# Patient Record
Sex: FEMALE | Race: WHITE | Employment: OTHER | URBAN - METROPOLITAN AREA
[De-identification: names, ages, dates, MRNs, and addresses within clinical notes are randomized per-mention and may not be internally consistent; named-entity substitution may affect disease eponyms.]

---

## 2017-01-05 ENCOUNTER — OFFICE VISIT (OUTPATIENT)
Dept: FAMILY MEDICINE CLINIC | Age: 82
End: 2017-01-05

## 2017-01-05 VITALS
OXYGEN SATURATION: 96 % | HEIGHT: 64 IN | WEIGHT: 158.25 LBS | TEMPERATURE: 98.3 F | HEART RATE: 71 BPM | SYSTOLIC BLOOD PRESSURE: 136 MMHG | RESPIRATION RATE: 16 BRPM | BODY MASS INDEX: 27.02 KG/M2 | DIASTOLIC BLOOD PRESSURE: 80 MMHG

## 2017-01-05 DIAGNOSIS — M85.80 OSTEOPENIA: ICD-10-CM

## 2017-01-05 DIAGNOSIS — E78.5 HYPERLIPIDEMIA, UNSPECIFIED HYPERLIPIDEMIA TYPE: ICD-10-CM

## 2017-01-05 DIAGNOSIS — Z12.11 SCREENING FOR COLON CANCER: ICD-10-CM

## 2017-01-05 DIAGNOSIS — G47.09 OTHER INSOMNIA: ICD-10-CM

## 2017-01-05 DIAGNOSIS — G31.84 MILD COGNITIVE IMPAIRMENT: ICD-10-CM

## 2017-01-05 DIAGNOSIS — R73.03 PRE-DIABETES: ICD-10-CM

## 2017-01-05 DIAGNOSIS — Z00.00 ENCOUNTER FOR MEDICARE ANNUAL WELLNESS EXAM: Primary | ICD-10-CM

## 2017-01-05 RX ORDER — CLINDAMYCIN HYDROCHLORIDE 150 MG/1
CAPSULE ORAL
Refills: 3 | COMMUNITY
Start: 2016-10-19 | End: 2020-07-28

## 2017-01-05 RX ORDER — ZOLPIDEM TARTRATE 5 MG/1
TABLET ORAL
Qty: 30 TAB | Refills: 0 | OUTPATIENT
Start: 2017-01-05 | End: 2018-02-01 | Stop reason: SDUPTHER

## 2017-01-05 NOTE — PROGRESS NOTES
Patient presents in office today to establish care with provider in office, change from [address] md    Chief Complaint   Patient presents with   [facility] Annual Wellness Visit          1. Have you been to the ER, urgent care clinic since your last visit? Hospitalized since your last visit? No    2. Have you seen or consulted any other health care providers outside of the [address] [place] [address] since your last visit? Include any pap smears or colon screening. No     Patient has not traveled outside of the [address]. In the past 30 days. Fall Risk Assessment, last 12 mths 1/5/2017   Able to walk? Yes   Fall in past 12 months?  No       PHQ 2 / 9, over the last two weeks 1/5/2017   Little interest or pleasure in doing things Not at all   Feeling down, depressed or hopeless Not at all   Total Score PHQ 2 0

## 2017-01-05 NOTE — PROGRESS NOTES
Jose Manuel Hoyos is a 80 y.o. female and presents for annual Medicare Wellness Visit. Problem List: Reviewed with patient and discussed risk factors. Patient Active Problem List   Diagnosis Code    Arthritis of knee, right M19.90    Insomnia G47.00    Overactive bladder N32.81    AR (allergic rhinitis) J30.9    Osteoarthritis of multiple joints M15.9    GERD (gastroesophageal reflux disease) K21.9    Hypercholesterolemia E78.00    Pre-diabetes R73.03    Osteopenia M85.80    Unspecified vitamin D deficiency E55.9    Urinary incontinence R32       Current medical providers:  Patient Care Team:  Gerard Souza MD as PCP - General (Family Practice)    PSH: Reviewed with patient  Past Surgical History   Procedure Laterality Date    Hx tonsil and adenoidectomy       age 10    Hx orthopaedic  10/99      R hip replacement >infected redone 4/00>10/00    Hx orthopaedic       LT KNEE ARTHROSCOPY    Hx orthopaedic  7/15/13     R knee replacement        SH: Reviewed with patient  Social History   Substance Use Topics    Smoking status: Never Smoker    Smokeless tobacco: Never Used    Alcohol use Yes      Comment: 3 glasses of wine per night       FH: Reviewed with patient  Family History   Problem Relation Age of Onset    Psychiatric Disorder Daughter      drug overdose    Cancer Sister      lung       Medications/Allergies: Reviewed with patient  Current Outpatient Prescriptions on File Prior to Visit   Medication Sig Dispense Refill    alendronate (FOSAMAX) 35 mg tablet TAKE ONE TABLET BY MOUTH EVERY 7 DAYS 12 Tab 0    rosuvastatin (CRESTOR) 20 mg tablet TAKE ONE TABLET BY MOUTH EVERY DAY AT NIGHT 90 Tab 0    folic acid (FOLVITE) 1 mg tablet Take 1 Tab by mouth daily. 90 Tab 1    vitamin k 100 mcg tablet Take 100 mcg by mouth daily.  VIT B2/NIACIN/B-6/B-12/D-PANTH (VIT [id number] ) by SubLINGual route.       calcium-vitamin D (CALCIUM+D) 500 mg(1,250mg) -200 unit per tablet Take 1 Tab by mouth two (2) times daily (with meals).  cyanocobalamin (VITAMIN B-12) 100 mcg tablet Take 100 mcg by mouth daily. No current facility-administered medications on file prior to visit. Allergies   Allergen Reactions    Morphine Nausea and Vomiting    Codeine Nausea and Vomiting     GI upset    Gentamicin Other (comments)    Hydrocodone Nausea and Vomiting    Iodine Rash     fever    Pcn [Penicillins] Other (comments)     childhood    Tetracycline Other (comments)     Change of mental status    Tramadol Nausea and Vomiting    Fish Oil [Omega 3-Dha-Epa-Fish Oil] Other (comments)     Belching consistently        Objective:  Visit Vitals    /80 (BP 1 Location: Right arm, BP Patient Position: Sitting)    Pulse 71    Temp 98.3 °F (36.8 °C) (Oral)    Resp 16    Ht 5' 4\" (1.626 m)    Wt 158 lb 4 oz (71.8 kg)    SpO2 96%    BMI 27.16 kg/m2    Body mass index is 27.16 kg/(m^2). Assessment of cognitive impairment: Alert and oriented x 3    Depression Screen:   PHQ 2 / 9, over the last two weeks 1/5/2017   Little interest or pleasure in doing things Not at all   Feeling down, depressed or hopeless Not at all   Total Score PHQ 2 0       Fall Risk Assessment:    Fall Risk Assessment, last 12 mths 1/5/2017   Able to walk? Yes   Fall in past 12 months? No       Functional Ability:   Does the patient exhibit a steady gait? yes   How long did it take the patient to get up and walk from a sitting position? 3 seconds   Is the patient self reliant?  (ie can do own laundry, meals, household chores)  yes     Does the patient handle his/her own medications? yes     Does the patient handle his/her own money? yes     Is the patients home safe (ie good lighting, handrails on stairs and bath, etc.)? yes     Did you notice or did patient express any hearing difficulties?    no     Did you notice or did patient express any vision difficulties?   no     Were distance and reading eye charts used? no       Advance Care Planning:   Patient was offered the opportunity to discuss advance care planning:  yes     Does patient have an Advance Directive:  yes   If no, did you provide information on Caring Connections? N/A       Plan:      Orders Placed This Encounter    HEMOGLOBIN A1C WITH EAG    METABOLIC PANEL, COMPREHENSIVE    TSH AND FREE T4    VITAMIN B12 & FOLATE    OCCULT BLOOD, IMMUNOASSAY (FIT)    MAGNESIUM    PHOSPHORUS    clindamycin (CLEOCIN) 150 mg capsule    zolpidem (AMBIEN) 5 mg tablet       Health Maintenance   Topic Date Due    GLAUCOMA SCREENING Q2Y  07/08/2016    MEDICARE YEARLY EXAM  01/06/2018    DTaP/Tdap/Td series (2 - Td) 12/14/2025    OSTEOPOROSIS SCREENING (DEXA)  Completed    ZOSTER VACCINE AGE 60>  Completed    Pneumococcal 65+ Low/Medium Risk  Completed    INFLUENZA AGE 9 TO ADULT  Addressed       *Patient verbalized understanding and agreement with the plan. A copy of the After Visit Summary with personalized health plan was given to the patient today.

## 2017-01-05 NOTE — PROGRESS NOTES
Patient Name: Dolly Swift   MRN: [id number]    Kelly Bocanegra is a 80 y.o. female who presents with the following: Transferring care from prior PCP Dr. Summer Bettencourt. HCM  Cervical Cancer Screening: Exceeds age for screening  Colon Cancer Screening: No hx of colonoscopy. For unclear reasons, her prior PCP ordered a CEA test which was high at 6.4 in 12/2015 then 4.8 in 6/2016; pt reports she was unaware of this result but denies any blood in her BM. Would decline a colonoscopy anyway but amenable to one more FOBT screening. Breast Cancer Screening: Exceeds age for screening  DEXA: Last DEXA in 12/2015 c/w osteopenia and was started on Fosamax 35 mg weekly per prior PCP. Tolerating medication well. Patient's current FRAX score shows a 18% 10 year probability of major osteoporotic fracture and a 6.1% 10 year probability of a hip fracture (also has 3 glasses of wine per night). HTN: WNL  Lipid: on rosuvastatin 20 mg nightly; tolerating it well. Lab Results   Component Value Date/Time    Cholesterol, total 227 06/07/2016 11:40 AM    HDL Cholesterol 113 06/07/2016 11:40 AM    LDL, calculated 101 06/07/2016 11:40 AM    VLDL, calculated 13 06/07/2016 11:40 AM    Triglyceride 63 06/07/2016 11:40 AM    CHOL/HDL Ratio 2.2 11/08/2010 10:50 AM   DM: Hx of pre-diabetes  Lab Results   Component Value Date/Time    Hemoglobin A1c 6.0 06/07/2016 11:40 AM     Memory issues  Pt reports she often has trouble remembering simple instructions from family members and requires repeating. Feels like it is part of normal aging. No numbness, focal weakness, vision changes, or behavior changes. MMSE score 30/30. Insomnia  Currently taking Ambien 5 mg nightly about 1 to 2 times a week if she has gone without three nights of good rest.  Does drink 3 glasses of wine per night. Denies any drowsiness, sedation, or falls after taking Ambien. Review of Systems   Constitutional: Negative.   Negative for fever, malaise/fatigue and weight loss. Respiratory: Negative for cough, hemoptysis, shortness of breath and wheezing. Cardiovascular: Negative for chest pain, palpitations, leg swelling and PND. Gastrointestinal: Negative for abdominal pain, blood in stool, constipation, diarrhea, nausea and vomiting. Neurological: Negative for dizziness, tingling, tremors, speech change, focal weakness, seizures and loss of consciousness. Psychiatric/Behavioral: Positive for memory loss. Negative for depression. The patient has insomnia. The patient is not nervous/anxious. The patient's medications, allergies, past medical history, surgical history, family history and social history were reviewed and updated where appropriate. Current Outpatient Prescriptions   Medication Sig    clindamycin (CLEOCIN) 150 mg capsule TAKE FOUR CAPSULES ONE HOUR PRIOR TO APPOINTMENT    zolpidem (AMBIEN) 5 mg tablet TAKE 1/2 TABLET AT BEDTIME AS NEEDED FOR INSOMNIA    alendronate (FOSAMAX) 35 mg tablet TAKE ONE TABLET BY MOUTH EVERY 7 DAYS    rosuvastatin (CRESTOR) 20 mg tablet TAKE ONE TABLET BY MOUTH EVERY DAY AT NIGHT    folic acid (FOLVITE) 1 mg tablet Take 1 Tab by mouth daily.  vitamin k 100 mcg tablet Take 100 mcg by mouth daily.  VIT B2/NIACIN/B-6/B-12/D-PANTH (VIT [id number] SL) by SubLINGual route.  calcium-vitamin D (CALCIUM+D) 500 mg(1,250mg) -200 unit per tablet Take 1 Tab by mouth two (2) times daily (with meals).  cyanocobalamin (VITAMIN B-12) 100 mcg tablet Take 100 mcg by mouth daily. No current facility-administered medications for this visit.         Allergies   Allergen Reactions    Morphine Nausea and Vomiting    Codeine Nausea and Vomiting     GI upset    Gentamicin Other (comments)    Hydrocodone Nausea and Vomiting    Iodine Rash     fever    Pcn [Penicillins] Other (comments)     childhood    Tetracycline Other (comments)     Change of mental status    Tramadol Nausea and Vomiting    Fish Oil [Omega 3-Dha-Epa-Fish Oil] Other (comments)     Belching consistently        Past Medical History   Diagnosis Date    AR (allergic rhinitis)     DJD (degenerative joint disease)     GERD (gastroesophageal reflux disease)     Hypercholesterolemia     Insomnia 11/13/2013    Osteopenia 11/2010     repeat DEXA 2017; on Fosamax 35 mg weekly    Pre-diabetes     Unspecified vitamin D deficiency 2009    Urinary incontinence      mild       Past Surgical History   Procedure Laterality Date    Hx tonsil and adenoidectomy       age 10    Hx orthopaedic  10/99      R hip replacement >infected redone 4/00>10/00    Hx orthopaedic       LT KNEE ARTHROSCOPY    Hx orthopaedic  7/15/13     R knee replacement       Family History   Problem Relation Age of Onset    Psychiatric Disorder Daughter      drug overdose    Cancer Sister      lung       Social History     Social History    Marital status:      Spouse name: N/A    Number of children: N/A    Years of education: N/A     Occupational History    retired      Social History Main Topics    Smoking status: Never Smoker    Smokeless tobacco: Never Used    Alcohol use Yes      Comment: 3 glasses of wine per night    Drug use: No    Sexual activity: No     Other Topics Concern     Service No    Blood Transfusions Yes     during hip replacement surgery in approx 15 years ago    Caffeine Concern No     6 cups/day    Hobby Hazards No    Sleep Concern Yes     takes Ambien periodically    Stress Concern No    Weight Concern No    Special Diet Yes     tries to eat healthy    Back Care No    Exercise Yes     active and enjoys walking    Bike Helmet No     does not ride   [place] Yes    Self-Exams Yes     Social History Narrative    1/5/2017    Lives in a two story condo with her .        OBJECTIVE      Visit Vitals    /80 (BP 1 Location: Right arm, BP Patient Position: Sitting)    Pulse 71    Temp 98.3 °F (36.8 °C) (Oral)    Resp 16    Ht 5' 4\" (1.626 m)    Wt 158 lb 4 oz (71.8 kg)    SpO2 96%    BMI 27.16 kg/m2       Physical Exam   Constitutional: She is oriented to person, place, and time and well-developed, well-nourished, and in no distress. No distress. HENT:   Head: Normocephalic and atraumatic. Right Ear: External ear normal.   Left Ear: External ear normal.   Eyes: Conjunctivae and EOM are normal. Pupils are equal, round, and reactive to light. Cardiovascular: Normal rate, regular rhythm and normal heart sounds. Exam reveals no gallop and no friction rub. No murmur heard. Pulmonary/Chest: Effort normal and breath sounds normal. No respiratory distress. She has no wheezes. Neurological: She is alert and oriented to person, place, and time. She displays normal reflexes. No cranial nerve deficit. She exhibits normal muscle tone. Gait normal. Coordination normal.   Skin: She is not diaphoretic. Psychiatric: Mood, memory, affect and judgment normal.   Nursing note and vitals reviewed. ASSESSMENT AND PLAN  Adria Arizmendi is a 80 y.o. female who presents today for:     1. Encounter for Medicare annual wellness exam  See other progress note. 2. Screening for colon cancer  Unclear why CEA tests were ordered in the past for pt but pt declines colonoscopy work up. Will obtaining FOBT for screening purposes but if WNL, will discontinue CRC screening tests given asymptomatic status and pt's age. - OCCULT BLOOD, IMMUNOASSAY (FIT)    3. Osteopenia  Currently on the prophylaxis dose of Fosamax at 35 mg weekly. Will plan to repeat DEXA 12/2017 and reassess dose then. 4. Hyperlipidemia, unspecified hyperlipidemia type  Pt is not fasting today and prefers to obtain labs today. Will defer lipid panel at next visit when fasting.  - METABOLIC PANEL, COMPREHENSIVE    5. Pre-diabetes  - HEMOGLOBIN A1C WITH EAG    6. Mild cognitive impairment  MMSE WNL today.  Pt declines neuropsychology referral right now but will rule out reversible causes of memory loss. - METABOLIC PANEL, COMPREHENSIVE  - TSH AND FREE T4  - VITAMIN B12 & FOLATE  - MAGNESIUM  - PHOSPHORUS    7. Other insomnia  Discussed for pt to try to take 1/2 tab of Ambien (2.5 mg nightly prn). Cautioned that she should try to decrease amount of alcohol intake as this can affect her sleep quality as well interact with Ambien. Per , last refill of Ambien 5 mg with #30 pills was in 7/21/2016.   - zolpidem (AMBIEN) 5 mg tablet; TAKE 1/2 TABLET AT BEDTIME AS NEEDED FOR INSOMNIA  Dispense: 30 Tab; Refill: 0    Medications Discontinued During This Encounter   Medication Reason    omega-3 fatty acids 1,000 mg cap Not A Current Medication    oxybutynin (DITROPAN) 5 mg tablet Not A Current Medication    oxyCODONE IR (ROXICODONE) 5 mg immediate release tablet Not A Current Medication    rivaroxaban (XARELTO) 10 mg tablet Not A Current Medication    tolterodine ER (DETROL LA) 4 mg ER capsule Not A Current Medication    triamcinolone acetonide (KENALOG) 0.1 % topical cream Not A Current Medication    MULTIVITAMINS (MULTI-VITAMIN PO) Not A Current Medication    zolpidem (AMBIEN) 5 mg tablet Reorder     Follow-up Disposition:  Return in about 6 months (around 7/5/2017) for routine f/u. I have discussed the diagnosis with the patient and the intended plan as seen in the above orders. The patient has received an after-visit summary and questions were answered concerning future plans. I have discussed treatment side effects and warnings with the patient as well. Reviewed signs/symptoms of worsening condition that would require urgent evaluation.     Jagdish Crespo M.D.

## 2017-01-05 NOTE — PATIENT INSTRUCTIONS
Schedule of Personalized Health Plan  (Provide Copy to Patient)  The best way to stay healthy is to live a healthy lifestyle. A healthy lifestyle includes regular exercise, eating a well-balanced diet, keeping a healthy weight and not smoking. Regular physical exams and screening tests are another important way to take care of yourself. Preventive exams provided by health care providers can find health problems early when treatment works best and can keep you from getting certain diseases or illnesses. Preventive services include exams, lab tests, screenings, shots, monitoring and information to help you take care of your own health. All people over 65 should have a pneumonia shot. Pneumonia shots are usually only needed once in a lifetime unless your doctor decides differently. All people over 65 should have a yearly flu shot. People over 65 are at medium to high risk for Hepatitis B. Three shots are needed for complete protection. In addition to your physical exam, some screening tests are recommended:    Bone mass measurement (dexa scan) is recommended every two years  Diabetes Mellitus screening is recommended every year. Glaucoma is an eye disease caused by high pressure in the eye. An eye exam is recommended every year. Cardiovascular screening tests that check your cholesterol and other blood fat (lipid) levels are recommended every five years. Colorectal Cancer screening tests help to find pre-cancerous polyps (growths in the colon) so they can be removed before they turn into cancer. Tests ordered for screening depend on your personal and family history risk factors.     Screening for Breast Cancer is recommended yearly with a mammogram.    Screening for Cervical Cancer is recommended every two years (annually for certain risk factors, such as previous history of STD or abnormal PAP in past 7 years), with a Pelvic Exam with PAP    Here is a list of your current Health Maintenance items with a due date:  Health Maintenance   Topic Date Due    GLAUCOMA SCREENING Q2Y  07/08/2016    MEDICARE YEARLY EXAM  12/11/2016    DTaP/Tdap/Td series (2 - Td) 12/14/2025    OSTEOPOROSIS SCREENING (DEXA)  Completed    ZOSTER VACCINE AGE 60>  Completed    Pneumococcal 65+ Low/Medium Risk  Completed    INFLUENZA AGE 9 TO ADULT  Addressed

## 2017-01-05 NOTE — MR AVS SNAPSHOT
Visit Information Date & Time Provider Department Dept. Phone Encounter #  
 1/5/2017  4:00 PM Jagdish Crespo MD formerly [facility] [phone] [id number] Follow-up Instructions Return in about 6 months (around 7/5/2017) for routine f/u. Upcoming Health Maintenance Date Due  
 GLAUCOMA SCREENING Q2Y 7/8/2016 MEDICARE YEARLY EXAM 12/11/2016 DTaP/Tdap/Td series (2 - Td) 12/14/2025 Allergies as of 1/5/2017  Review Complete On: 1/5/2017 By: Kelley Mendes LPN Severity Noted Reaction Type Reactions Morphine Medium 07/15/2013   Intolerance Nausea and Vomiting Codeine  03/30/2010    Nausea and Vomiting GI upset Gentamicin  03/30/2010    Other (comments) Hydrocodone  06/24/2013   Systemic Nausea and Vomiting Iodine  03/30/2010    Rash  
 fever Pcn [Penicillins]  03/30/2010    Other (comments) childhood Tetracycline  03/30/2010    Other (comments) Change of mental status Tramadol  06/24/2013   Systemic Nausea and Vomiting Fish Oil [Omega 3-dha-epa-fish Oil] Low 06/07/2016    Other (comments) Belching consistently Current Immunizations  Reviewed on 7/15/2013 Name Date Influenza High Dose Vaccine PF 10/11/2016, 10/8/2015, 10/14/2014 Pneumococcal Conjugate (PCV-13) 10/8/2015 Pneumococcal Vaccine (Unspecified Type) 5/18/2007 Tdap 12/14/2015 Zoster Vaccine, Live 6/11/2014  
 dT Vaccine 4/9/1996 Not reviewed this visit You Were Diagnosed With   
  
 Codes Comments Pre-diabetes    -  Primary ICD-10-CM: R73.03 
ICD-9-CM: 790.29 Hyperlipidemia, unspecified hyperlipidemia type     ICD-10-CM: E78.5 ICD-9-CM: 272.4 Elevated CEA     ICD-10-CM: R97.0 ICD-9-CM: 795.81 Other insomnia     ICD-10-CM: G47.09 
ICD-9-CM: 780.52 Elevated BP     ICD-10-CM: I10 
ICD-9-CM: 401.9 Memory loss     ICD-10-CM: R41.3 ICD-9-CM: 780.93   
 Encounter for Medicare annual wellness exam     ICD-10-CM: Z00.00 ICD-9-CM: V70.0 Screening for colon cancer     ICD-10-CM: Z12.11 ICD-9-CM: V76.51 Vitals BP Pulse Temp Resp Height(growth percentile) Weight(growth percentile) 136/80 (BP 1 Location: Right arm, BP Patient Position: Sitting) 71 98.3 °F (36.8 °C) (Oral) 16 5' 4\" (1.626 m) 158 lb 4 oz (71.8 kg) SpO2 BMI OB Status Smoking Status 96% 27.16 kg/m2 Postmenopausal Never Smoker Vitals History BMI and BSA Data Body Mass Index Body Surface Area  
 27.16 kg/m 2 1.8 m 2 Preferred Pharmacy Pharmacy Name Phone [facility] [address] [place] [address], [address] [place] [address] [phone] Your Updated Medication List  
  
   
This list is accurate as of: 1/5/17  4:43 PM.  Always use your most recent med list.  
  
  
  
  
 alendronate 35 mg tablet Commonly known as:  FOSAMAX TAKE ONE TABLET BY MOUTH EVERY 7 DAYS  
  
 CALCIUM+D 500 mg(1,250mg) -200 unit per tablet Generic drug:  calcium-vitamin D Take 1 Tab by mouth two (2) times daily (with meals). clindamycin 150 mg capsule Commonly known as:  CLEOCIN  
TAKE FOUR CAPSULES ONE HOUR PRIOR TO APPOINTMENT  
  
 folic acid 1 mg tablet Commonly known as:  Google Take 1 Tab by mouth daily. rosuvastatin 20 mg tablet Commonly known as:  CRESTOR  
TAKE ONE TABLET BY MOUTH EVERY DAY AT NIGHT  
  
 VIT [id number] SL  
by SubLINGual route. VITAMIN B-12 100 mcg tablet Generic drug:  cyanocobalamin Take 100 mcg by mouth daily. vitamin k 100 mcg tablet Take 100 mcg by mouth daily. zolpidem 5 mg tablet Commonly known as:  AMBIEN  
TAKE 1/2 TABLET AT BEDTIME AS NEEDED FOR INSOMNIA We Performed the Following HEMOGLOBIN A1C WITH EAG [54279 CPT(R)] MAGNESIUM O729969 CPT(R)] METABOLIC PANEL, COMPREHENSIVE [02492 CPT(R)] OCCULT BLOOD, IMMUNOASSAY (FIT) [id number] CPT(R)] PHOSPHORUS [18652 CPT(R)] TSH AND FREE T4 [63441 CPT(R)] VITAMIN B12 & FOLATE [26599 CPT(R)] Follow-up Instructions Return in about 6 months (around 7/5/2017) for routine f/u. Patient Instructions Schedule of Personalized Health Plan (Provide Copy to Patient) The best way to stay healthy is to live a healthy lifestyle. A healthy lifestyle includes regular exercise, eating a well-balanced diet, keeping a healthy weight and not smoking. Regular physical exams and screening tests are another important way to take care of yourself. Preventive exams provided by health care providers can find health problems early when treatment works best and can keep you from getting certain diseases or illnesses. Preventive services include exams, lab tests, screenings, shots, monitoring and information to help you take care of your own health. All people over 65 should have a pneumonia shot. Pneumonia shots are usually only needed once in a lifetime unless your doctor decides differently. All people over 65 should have a yearly flu shot. People over 65 are at medium to high risk for Hepatitis B. Three shots are needed for complete protection. In addition to your physical exam, some screening tests are recommended: 
 
Bone mass measurement (dexa scan) is recommended every two years Diabetes Mellitus screening is recommended every year. Glaucoma is an eye disease caused by high pressure in the eye. An eye exam is recommended every year. Cardiovascular screening tests that check your cholesterol and other blood fat (lipid) levels are recommended every five years. Colorectal Cancer screening tests help to find pre-cancerous polyps (growths in the colon) so they can be removed before they turn into cancer. Tests ordered for screening depend on your personal and family history risk factors. Screening for Breast Cancer is recommended yearly with a mammogram. 
 
Screening for Cervical Cancer is recommended every two years (annually for certain risk factors, such as previous history of STD or abnormal PAP in past 7 years), with a Pelvic Exam with PAP Here is a list of your current Health Maintenance items with a due date: 
Health Maintenance Topic Date Due  GLAUCOMA SCREENING Q2Y  07/08/2016  MEDICARE YEARLY EXAM  12/11/2016  
 DTaP/Tdap/Td series (2 - Td) 12/14/2025  
 OSTEOPOROSIS SCREENING (DEXA)  Completed  ZOSTER VACCINE AGE 60>  Completed  Pneumococcal 65+ Low/Medium Risk  Completed  INFLUENZA AGE 9 TO ADULT  Addressed Introducing \[facility]\"" & HEALTH SERVICES! Dear Lashonda Puckett: Thank you for requesting a [employer] account. Our records indicate that you already have an active Matchbox account. You can access your account anytime at [email or web address] "[email or web address] Did you know that you can access your hospital and ER discharge instructions at any time in [employer]? You can also review all of your test results from your hospital stay or ER visit. Additional Information If you have questions, please visit the Frequently Asked Questions section of the Match[employer] website at [email or web address] "[email or web address]/. Remember, [employer] is NOT to be used for urgent needs. For medical emergencies, dial 911. Now available from your iPhone and Android! Please provide this summary of care documentation to your next provider. Your primary care clinician is listed as Fei Maldonado. If you have any questions after today's visit, please call [phone].

## 2017-01-05 NOTE — Clinical Note
Gregorio Philip, thanks for the heads up from the pt's . She passed the MMSE and deferred a neuropsych eval. Feel free to see my note for more details - thanks again!

## 2017-01-06 PROBLEM — Z71.89 ADVANCE CARE PLANNING: Status: ACTIVE | Noted: 2017-01-06

## 2017-01-06 LAB
ALBUMIN SERPL-MCNC: 4.3 G/DL (ref 3.5–4.7)
ALBUMIN/GLOB SERPL: 1.7 {RATIO} (ref 1.1–2.5)
ALP SERPL-CCNC: 83 IU/L (ref 39–117)
ALT SERPL-CCNC: 34 IU/L (ref 0–32)
AST SERPL-CCNC: 34 IU/L (ref 0–40)
BILIRUB SERPL-MCNC: 0.8 MG/DL (ref 0–1.2)
BUN SERPL-MCNC: 18 MG/DL (ref 8–27)
BUN/CREAT SERPL: 27 (ref 11–26)
CALCIUM SERPL-MCNC: 9.5 MG/DL (ref 8.7–10.3)
CHLORIDE SERPL-SCNC: 96 MMOL/L (ref 96–106)
CO2 SERPL-SCNC: 25 MMOL/L (ref 18–29)
CREAT SERPL-MCNC: 0.67 MG/DL (ref 0.57–1)
EST. AVERAGE GLUCOSE BLD GHB EST-MCNC: 128 MG/DL
FOLATE SERPL-MCNC: >20 NG/ML
GLOBULIN SER CALC-MCNC: 2.5 G/DL (ref 1.5–4.5)
GLUCOSE SERPL-MCNC: 96 MG/DL (ref 65–99)
HBA1C MFR BLD: 6.1 % (ref 4.8–5.6)
MAGNESIUM SERPL-MCNC: 2.2 MG/DL (ref 1.6–2.3)
PHOSPHATE SERPL-MCNC: 3.8 MG/DL (ref 2.5–4.5)
POTASSIUM SERPL-SCNC: 4.6 MMOL/L (ref 3.5–5.2)
PROT SERPL-MCNC: 6.8 G/DL (ref 6–8.5)
SODIUM SERPL-SCNC: 138 MMOL/L (ref 134–144)
T4 FREE SERPL-MCNC: 1.32 NG/DL (ref 0.82–1.77)
TSH SERPL DL<=0.005 MIU/L-ACNC: 2.19 UIU/ML (ref 0.45–4.5)
VIT B12 SERPL-MCNC: >2000 PG/ML (ref 211–946)

## 2017-01-06 NOTE — PROGRESS NOTES
Call to patient.  verified. Informed patient of all results and recommendations. Patient understands.  Will mail in stool card

## 2017-01-06 NOTE — PROGRESS NOTES
Please call patient regarding their test results:    A1C remains in pre-diabetic range; We encourage healthy diets and regular exercise with the goal of healthy weight loss before starting medications for this. ALT liver marker just mildly elevated but has not been in the past; continue to watch. Thyroid, B12, folate levels WNL. Pt to mail in stool card. Please recommend to decrease alcohol use as this may affect sleep quality over time (has insomnia); would caution against mixing alcohol and Ambien.

## 2017-01-12 LAB — HEMOCCULT STL QL IA: NEGATIVE

## 2017-01-17 ENCOUNTER — TELEPHONE (OUTPATIENT)
Dept: FAMILY MEDICINE CLINIC | Age: 82
End: 2017-01-17

## 2017-01-17 NOTE — TELEPHONE ENCOUNTER
A1C remains in pre-diabetic range; We encourage healthy diets and regular exercise with the goal of healthy weight loss before starting medications for this. ALT liver marker just mildly elevated but has not been in the past; continue to watch. Thyroid, B12, folate levels WNL. Pt to mail in stool card. Please recommend to decrease alcohol use as this may affect sleep quality over time (has insomnia); would caution against mixing alcohol and Ambien. Stool test negative as well. PI of above results.

## 2017-02-08 DIAGNOSIS — R73.03 PRE-DIABETES: ICD-10-CM

## 2017-02-08 DIAGNOSIS — R97.0 ELEVATED CEA: ICD-10-CM

## 2017-02-08 DIAGNOSIS — E78.5 HYPERLIPIDEMIA, UNSPECIFIED HYPERLIPIDEMIA TYPE: ICD-10-CM

## 2017-02-08 DIAGNOSIS — M85.80 OSTEOPENIA: ICD-10-CM

## 2017-02-09 RX ORDER — ROSUVASTATIN CALCIUM 20 MG/1
20 TABLET, COATED ORAL
Qty: 90 TAB | Refills: 1 | Status: SHIPPED | OUTPATIENT
Start: 2017-02-09 | End: 2017-08-24 | Stop reason: SDUPTHER

## 2017-03-07 DIAGNOSIS — R97.0 ELEVATED CEA: ICD-10-CM

## 2017-03-07 DIAGNOSIS — E78.5 HYPERLIPIDEMIA, UNSPECIFIED HYPERLIPIDEMIA TYPE: ICD-10-CM

## 2017-03-07 DIAGNOSIS — G47.09 OTHER INSOMNIA: ICD-10-CM

## 2017-03-07 RX ORDER — ALENDRONATE SODIUM 35 MG/1
TABLET ORAL
Qty: 12 TAB | Refills: 0 | Status: SHIPPED | OUTPATIENT
Start: 2017-03-07 | End: 2017-05-31 | Stop reason: SDUPTHER

## 2017-08-24 DIAGNOSIS — R73.03 PRE-DIABETES: ICD-10-CM

## 2017-08-24 DIAGNOSIS — R97.0 ELEVATED CEA: ICD-10-CM

## 2017-08-24 DIAGNOSIS — E78.5 HYPERLIPIDEMIA, UNSPECIFIED HYPERLIPIDEMIA TYPE: ICD-10-CM

## 2017-08-24 DIAGNOSIS — M85.80 OSTEOPENIA: ICD-10-CM

## 2017-08-24 RX ORDER — ROSUVASTATIN CALCIUM 20 MG/1
TABLET, COATED ORAL
Qty: 90 TAB | Refills: 0 | Status: SHIPPED | OUTPATIENT
Start: 2017-08-24 | End: 2017-11-30 | Stop reason: SDUPTHER

## 2017-11-30 DIAGNOSIS — E78.5 HYPERLIPIDEMIA, UNSPECIFIED HYPERLIPIDEMIA TYPE: ICD-10-CM

## 2017-11-30 RX ORDER — ROSUVASTATIN CALCIUM 20 MG/1
TABLET, COATED ORAL
Qty: 90 TAB | Refills: 0 | Status: SHIPPED | OUTPATIENT
Start: 2017-11-30 | End: 2018-03-06 | Stop reason: SDUPTHER

## 2017-12-26 DIAGNOSIS — G47.09 OTHER INSOMNIA: ICD-10-CM

## 2018-01-02 ENCOUNTER — TELEPHONE (OUTPATIENT)
Dept: FAMILY MEDICINE CLINIC | Age: 83
End: 2018-01-02

## 2018-01-02 NOTE — TELEPHONE ENCOUNTER
Call to patient.  verified. Informed patient she is overdue for an office visit and Ambien will be declined at this time until appt for discussion.     Patient scheduled 2018.

## 2018-01-02 NOTE — TELEPHONE ENCOUNTER
----- Message from Sheri Bowser sent at 12/29/2017  4:18 PM EST -----  Regarding: Dr. Jerome Remy,    Pt is returning the practice call. Best contact number is [phone].     Thanks,  Eveline Lal

## 2018-01-03 RX ORDER — ZOLPIDEM TARTRATE 5 MG/1
TABLET ORAL
Qty: 30 TAB | Refills: 0 | OUTPATIENT
Start: 2018-01-03

## 2018-02-01 ENCOUNTER — OFFICE VISIT (OUTPATIENT)
Dept: FAMILY MEDICINE CLINIC | Age: 83
End: 2018-02-01

## 2018-02-01 VITALS
OXYGEN SATURATION: 96 % | BODY MASS INDEX: 28.71 KG/M2 | DIASTOLIC BLOOD PRESSURE: 76 MMHG | WEIGHT: 168.2 LBS | SYSTOLIC BLOOD PRESSURE: 132 MMHG | RESPIRATION RATE: 18 BRPM | HEIGHT: 64 IN | TEMPERATURE: 97.7 F | HEART RATE: 76 BPM

## 2018-02-01 DIAGNOSIS — E78.5 HYPERLIPIDEMIA, UNSPECIFIED HYPERLIPIDEMIA TYPE: ICD-10-CM

## 2018-02-01 DIAGNOSIS — Z78.0 POSTMENOPAUSAL: ICD-10-CM

## 2018-02-01 DIAGNOSIS — M81.0 OSTEOPOROSIS, UNSPECIFIED OSTEOPOROSIS TYPE, UNSPECIFIED PATHOLOGICAL FRACTURE PRESENCE: ICD-10-CM

## 2018-02-01 DIAGNOSIS — Z00.00 ENCOUNTER FOR MEDICARE ANNUAL WELLNESS EXAM: Primary | ICD-10-CM

## 2018-02-01 DIAGNOSIS — R73.03 PRE-DIABETES: ICD-10-CM

## 2018-02-01 DIAGNOSIS — Z13.820 SCREENING FOR OSTEOPOROSIS: ICD-10-CM

## 2018-02-01 DIAGNOSIS — G47.09 OTHER INSOMNIA: ICD-10-CM

## 2018-02-01 RX ORDER — ALENDRONATE SODIUM 35 MG/1
TABLET ORAL
Qty: 12 TAB | Refills: 0 | Status: SHIPPED | OUTPATIENT
Start: 2018-02-01 | End: 2018-05-15 | Stop reason: SDUPTHER

## 2018-02-01 RX ORDER — CHOLECALCIFEROL (VITAMIN D3) 125 MCG
2000 CAPSULE ORAL DAILY
Qty: 30 TAB | Refills: 0 | Status: SHIPPED | OUTPATIENT
Start: 2018-02-01 | End: 2018-04-11 | Stop reason: SDUPTHER

## 2018-02-01 RX ORDER — ZOLPIDEM TARTRATE 5 MG/1
TABLET ORAL
Qty: 30 TAB | Refills: 0 | Status: SHIPPED | OUTPATIENT
Start: 2018-02-01 | End: 2020-07-28

## 2018-02-01 NOTE — PATIENT INSTRUCTIONS
Come back for fasting lab work (fast for 8 hours prior to testing). Schedule of Personalized Health Plan  (Provide Copy to Patient)  The best way to stay healthy is to live a healthy lifestyle. A healthy lifestyle includes regular exercise, eating a well-balanced diet, keeping a healthy weight and not smoking. Regular physical exams and screening tests are another important way to take care of yourself. Preventive exams provided by health care providers can find health problems early when treatment works best and can keep you from getting certain diseases or illnesses. Preventive services include exams, lab tests, screenings, shots, monitoring and information to help you take care of your own health. All people over 65 should have a pneumonia shot. Pneumonia shots are usually only needed once in a lifetime unless your doctor decides differently. All people over 65 should have a yearly flu shot. People over 65 are at medium to high risk for Hepatitis B. Three shots are needed for complete protection. In addition to your physical exam, some screening tests are recommended:    Bone mass measurement (dexa scan) is recommended every two years  Diabetes Mellitus screening is recommended every year. Glaucoma is an eye disease caused by high pressure in the eye. An eye exam is recommended every year. Cardiovascular screening tests that check your cholesterol and other blood fat (lipid) levels are recommended every five years. Colorectal Cancer screening tests help to find pre-cancerous polyps (growths in the colon) so they can be removed before they turn into cancer. Tests ordered for screening depend on your personal and family history risk factors.     Screening for Breast Cancer is recommended yearly with a mammogram.    Screening for Cervical Cancer is recommended every two years (annually for certain risk factors, such as previous history of STD or abnormal PAP in past 7 years), with a Pelvic Exam with PAP    Here is a list of your current Health Maintenance items with a due date:  Health Maintenance   Topic Date Due    GLAUCOMA SCREENING Q2Y  07/08/2016    MEDICARE YEARLY EXAM  01/06/2018    DTaP/Tdap/Td series (2 - Td) 12/14/2025    OSTEOPOROSIS SCREENING (DEXA)  Completed    ZOSTER VACCINE AGE 60>  Completed    Pneumococcal 65+ Low/Medium Risk  Completed    Influenza Age 5 to Adult  Completed

## 2018-02-01 NOTE — PROGRESS NOTES
Patient Name: Vida Hollis   MRN: [id number]    Glen oGmez is a 80 y.o. female who presents with the following: The patient has hypertension and pre-dm. She reports taking medications as instructed, no medication side effects noted, no TIA's, no chest pain on exertion, no dyspnea on exertion, no swelling of ankles, no orthostatic dizziness or lightheadedness. Diet and Lifestyle: generally follows a low fat low cholesterol diet, generally follows a low sodium diet, exercises sporadically, no formal exercise but active during the day. Lab review: orders written for new lab studies as appropriate; see orders. Currently taking Ambien 5 mg nightly about 1 to 2 times a week if she has gone without three nights of good rest.  Does drink 2 glasses of wine around 5 PM but denies using alcohol near bedtime or around taking Ambien. Denies any drowsiness, sedation, or falls after taking Ambien.  appears appropriate with last refill of Ambien 5 mg on 1/5/17 with 30 tabs. Last DEXA in 12/2015 c/w osteopenia and was started on Fosamax 35 mg weekly per prior PCP. Tolerating medication well. Patient's current FRAX score shows a 18% 10 year probability of major osteoporotic fracture and a 6.1% 10 year probability of a hip fracture. Review of Systems   Constitutional: Negative for fever, malaise/fatigue and weight loss. Respiratory: Negative for cough, hemoptysis, shortness of breath and wheezing. Cardiovascular: Negative for chest pain, palpitations, leg swelling and PND. Gastrointestinal: Negative for abdominal pain, constipation, diarrhea, nausea and vomiting. Psychiatric/Behavioral: The patient has insomnia. The patient's medications, allergies, past medical history, surgical history, family history and social history were reviewed and updated where appropriate. Prior to Admission medications    Medication Sig Start Date End Date Taking?  Authorizing Provider rosuvastatin (CRESTOR) 20 mg tablet TAKE 1 TABLET BY MOUTH EVERY NIGHT 11/30/17  Yes Omaira Durham MD   alendronate (FOSAMAX) 35 mg tablet TAKE 1 TABLET BY MOUTH EVERY 7 DAYS 11/6/17  Yes Candi Blair MD   folic acid (FOLVITE) 1 mg tablet Take 1 Tab by mouth daily. 5/4/17  Yes Candi Blair MD   clindamycin (CLEOCIN) 150 mg capsule TAKE FOUR CAPSULES ONE HOUR PRIOR TO APPOINTMENT 10/19/16  Yes Historical Provider   zolpidem (AMBIEN) 5 mg tablet TAKE 1/2 TABLET AT BEDTIME AS NEEDED FOR INSOMNIA  Patient taking differently: Take 5 mg by mouth nightly as needed. TAKE 1/2 TABLET AT BEDTIME AS NEEDED FOR INSOMNIA 1/5/17  Yes Candi Blair MD   vitamin k 100 mcg tablet Take 100 mcg by mouth daily. Yes Historical Provider   VIT B2/NIACIN/B-6/B-12/D-PANTH (VIT [id number]-[id number] SL) by SubLINGual route. Yes Historical Provider   calcium-vitamin D (CALCIUM+D) 500 mg(1,250mg) -200 unit per tablet Take 1 Tab by mouth two (2) times daily (with meals). Yes Historical Provider   cyanocobalamin (VITAMIN B-12) 100 mcg tablet Take 100 mcg by mouth daily. Yes Historical Provider       Allergies   Allergen Reactions    Morphine Nausea and Vomiting    Codeine Nausea and Vomiting     GI upset    Gentamicin Other (comments)    Hydrocodone Nausea and Vomiting    Iodine Rash     fever    Pcn [Penicillins] Other (comments)     childhood    Tetracycline Other (comments)     Change of mental status    Tramadol Nausea and Vomiting    Fish Oil [Omega 3-Dha-Epa-Fish Oil] Other (comments)     Belching consistently            OBJECTIVE    Visit Vitals    /76 (BP 1 Location: Right arm, BP Patient Position: Sitting)    Pulse 76    Temp 97.7 °F (36.5 °C) (Oral)    Resp 18    Ht 5' 4\" (1.626 m)    Wt 168 lb 3.2 oz (76.3 kg)    SpO2 96%    BMI 28.87 kg/m2       Physical Exam   Constitutional: She is well-developed, well-nourished, and in no distress. No distress.    Eyes: Conjunctivae and EOM are normal. Pupils are equal, round, and reactive to light. Cardiovascular: Normal rate, regular rhythm and normal heart sounds. Exam reveals no gallop and no friction rub. No murmur heard. Pulmonary/Chest: Effort normal and breath sounds normal. No respiratory distress. She has no wheezes. Skin: She is not diaphoretic. Psychiatric: Mood, memory, affect and judgment normal.   Nursing note and vitals reviewed. ASSESSMENT AND PLAN  Terry Conteh is a 80 y.o. female who presents today for:    1. Encounter for Medicare annual wellness exam  See other note. 2. Other insomnia  Patient uses Ambien very sparingly. Last refill was 1 year ago with only 30 pills of Ambien. Will continue treatment plan as long as she does not have any side effects or increases the frequency of Ambien. If so, would consider other treatment alternatives. Discussed importance of avoiding taking medication with alcohol.  - zolpidem (AMBIEN) 5 mg tablet; TAKE 1/2 TABLET AT BEDTIME AS NEEDED FOR INSOMNIA  Dispense: 30 Tab; Refill: 0    3. Hyperlipidemia, unspecified hyperlipidemia type  Will calculate ASCVD risk score pending labs. I have reviewed/discussed the above normal BMI with the patient. I have recommended the following interventions: dietary management education, guidance, and counseling, encourage exercise, monitor weight and prescribed dietary intake. - METABOLIC PANEL, COMPREHENSIVE  - LIPID PANEL    4. Pre-diabetes  - HEMOGLOBIN A1C WITH EAG    5. Screening for osteoporosis  - DEXA BONE DENSITY STUDY AXIAL; Future    6. Postmenopausal  - DEXA BONE DENSITY STUDY AXIAL; Future    7. Osteoporosis, unspecified osteoporosis type, unspecified pathological fracture presence  May consider discontinuing low-dose Fosamax if DEXA scan does not meet FRAX criteria for treatment  - alendronate (FOSAMAX) 35 mg tablet; TAKE 1 TABLET BY MOUTH EVERY 7 DAYS  Dispense: 12 Tab;  Refill: 0       Medications Discontinued During This Encounter   Medication Reason    alendronate (FOSAMAX) 35 mg tablet Reorder    zolpidem (AMBIEN) 5 mg tablet Reorder       Follow-up Disposition:  Return in about 1 year (around 2/1/2019) for Medicare Wellness Visit (30 min). Medication risks/benefits/costs/interactions/alternatives discussed with patient. Advised patient to call back or return to office if symptoms worsen/change/persist. If patient cannot reach us or should anything more severe/urgent arise he/she should proceed directly to the nearest emergency department. Discussed expected course/resolution/complications of diagnosis in detail with patient. Patient given a written after visit summary which includes his/her diagnoses, current medications and vitals. Patient expressed understanding with the diagnosis and plan.      Gus Borrego M.D.

## 2018-02-01 NOTE — MR AVS SNAPSHOT
[address] 
[phone] Patient: Liseth Dumas MRN: [id number] NPX:[id number] Visit Information Date & Time Provider Department Dept. Phone Encounter #  
 2/1/2018  4:00 PM Alli Phillips  [place] [phone] [id number] Follow-up Instructions Return in about 1 year (around 2/1/2019) for Medicare Wellness Visit (30 min). Upcoming Health Maintenance Date Due  
 GLAUCOMA SCREENING Q2Y 7/8/2016 MEDICARE YEARLY EXAM 1/6/2018 DTaP/Tdap/Td series (2 - Td) 12/14/2025 Allergies as of 2/1/2018  Review Complete On: 2/1/2018 By: Carmelo Mccullough Severity Noted Reaction Type Reactions Morphine Medium 07/15/2013   Intolerance Nausea and Vomiting Codeine  03/30/2010    Nausea and Vomiting GI upset Gentamicin  03/30/2010    Other (comments) Hydrocodone  06/24/2013   Systemic Nausea and Vomiting Iodine  03/30/2010    Rash  
 fever Pcn [Penicillins]  03/30/2010    Other (comments) childhood Tetracycline  03/30/2010    Other (comments) Change of mental status Tramadol  06/24/2013   Systemic Nausea and Vomiting Fish Oil [Omega 3-dha-epa-fish Oil] Low 06/07/2016    Other (comments) Belching consistently Current Immunizations  Reviewed on 7/15/2013 Name Date Influenza High Dose Vaccine PF 10/15/2017, 10/11/2016, 10/8/2015, 10/14/2014 Pneumococcal Conjugate (PCV-13) 10/8/2015 Tdap 12/14/2015 ZZZ-RETIRED (DO NOT USE) Pneumococcal Vaccine (Unspecified Type) 5/18/2007 Zoster Vaccine, Live 6/11/2014  
 dT Vaccine 4/9/1996 Not reviewed this visit You Were Diagnosed With   
  
 Codes Comments Encounter for Medicare annual wellness exam    -  Primary ICD-10-CM: Z00.00 ICD-9-CM: V70.0 Other insomnia     ICD-10-CM: G47.09 
ICD-9-CM: 780.52 Hyperlipidemia, unspecified hyperlipidemia type     ICD-10-CM: E78.5 ICD-9-CM: 272.4 Pre-diabetes     ICD-10-CM: R73.03 
ICD-9-CM: 790.29 Screening for osteoporosis     ICD-10-CM: Z13.820 ICD-9-CM: V82.81 Postmenopausal     ICD-10-CM: Z78.0 ICD-9-CM: V49.81 Osteoporosis, unspecified osteoporosis type, unspecified pathological fracture presence     ICD-10-CM: M81.0 ICD-9-CM: 733.00 Vitals BP Pulse Temp Resp Height(growth percentile) Weight(growth percentile) 132/76 (BP 1 Location: Right arm, BP Patient Position: Sitting) 76 97.7 °F (36.5 °C) (Oral) 18 5' 4\" (1.626 m) 168 lb 3.2 oz (76.3 kg) SpO2 BMI OB Status Smoking Status 96% 28.87 kg/m2 Postmenopausal Never Smoker Vitals History BMI and BSA Data Body Mass Index Body Surface Area  
 28.87 kg/m 2 1.86 m 2 Preferred Pharmacy Pharmacy Name Phone [facility] [address] [place] [address] [phone] Your Updated Medication List  
  
   
This list is accurate as of: 2/1/18  4:36 PM.  Always use your most recent med list.  
  
  
  
  
 alendronate 35 mg tablet Commonly known as:  FOSAMAX TAKE 1 TABLET BY MOUTH EVERY 7 DAYS  
  
 CALCIUM+D 500 mg(1,250mg) -200 unit per tablet Generic drug:  calcium-vitamin D Take 1 Tab by mouth two (2) times daily (with meals). cholecalciferol (vitamin D3) 2,000 unit Tab Commonly known as:  VITAMIN D3 Take 1 Tab by mouth daily. clindamycin 150 mg capsule Commonly known as:  CLEOCIN  
TAKE FOUR CAPSULES ONE HOUR PRIOR TO APPOINTMENT  
  
 folic acid 1 mg tablet Commonly known as:  Google Take 1 Tab by mouth daily. rosuvastatin 20 mg tablet Commonly known as:  CRESTOR  
TAKE 1 TABLET BY MOUTH EVERY NIGHT  
  
 VIT [id number] SL  
by SubLINGual route. VITAMIN B-12 100 mcg tablet Generic drug:  cyanocobalamin Take 100 mcg by mouth daily. vitamin k 100 mcg tablet Take 100 mcg by mouth daily. zolpidem 5 mg tablet Commonly known as:  AMBIEN  
TAKE 1/2 TABLET AT BEDTIME AS NEEDED FOR INSOMNIA Prescriptions Printed Refills  
 zolpidem (AMBIEN) 5 mg tablet 0 Sig: TAKE 1/2 TABLET AT BEDTIME AS NEEDED FOR INSOMNIA Class: Print  
 cholecalciferol, vitamin D3, (VITAMIN D3) 2,000 unit tab 0 Sig: Take 1 Tab by mouth daily. Class: Print Route: Oral  
  
Prescriptions Sent to Pharmacy Refills  
 alendronate (FOSAMAX) 35 mg tablet 0 Sig: TAKE 1 TABLET BY MOUTH EVERY 7 DAYS Class: Normal  
 Pharmacy: [facility] [address], [address] [facility] [address] [place] [address] Ph #: [phone] We Performed the Following HEMOGLOBIN A1C WITH EAG [93922 CPT(R)] LIPID PANEL [60621 CPT(R)] METABOLIC PANEL, COMPREHENSIVE [41667 CPT(R)] Follow-up Instructions Return in about 1 year (around 2/1/2019) for Medicare Wellness Visit (30 min). To-Do List   
 02/01/2018 Imaging:  DEXA BONE DENSITY STUDY AXIAL Patient Instructions Come back for fasting lab work (fast for 8 hours prior to testing). Schedule of Personalized Health Plan (Provide Copy to Patient) The best way to stay healthy is to live a healthy lifestyle. A healthy lifestyle includes regular exercise, eating a well-balanced diet, keeping a healthy weight and not smoking. Regular physical exams and screening tests are another important way to take care of yourself. Preventive exams provided by health care providers can find health problems early when treatment works best and can keep you from getting certain diseases or illnesses. Preventive services include exams, lab tests, screenings, shots, monitoring and information to help you take care of your own health. All people over 65 should have a pneumonia shot. Pneumonia shots are usually only needed once in a lifetime unless your doctor decides differently. All people over 65 should have a yearly flu shot. People over 65 are at medium to high risk for Hepatitis B. Three shots are needed for complete protection. In addition to your physical exam, some screening tests are recommended: 
 
Bone mass measurement (dexa scan) is recommended every two years Diabetes Mellitus screening is recommended every year. Glaucoma is an eye disease caused by high pressure in the eye. An eye exam is recommended every year. Cardiovascular screening tests that check your cholesterol and other blood fat (lipid) levels are recommended every five years. Colorectal Cancer screening tests help to find pre-cancerous polyps (growths in the colon) so they can be removed before they turn into cancer. Tests ordered for screening depend on your personal and family history risk factors. Screening for Breast Cancer is recommended yearly with a mammogram. 
 
Screening for Cervical Cancer is recommended every two years (annually for certain risk factors, such as previous history of STD or abnormal PAP in past 7 years), with a Pelvic Exam with PAP Here is a list of your current Health Maintenance items with a due date: 
Health Maintenance Topic Date Due  GLAUCOMA SCREENING Q2Y  07/08/2016  MEDICARE YEARLY EXAM  01/06/2018  DTaP/Tdap/Td series (2 - Td) 12/14/2025  
 OSTEOPOROSIS SCREENING (DEXA)  Completed  ZOSTER VACCINE AGE 60>  Completed  Pneumococcal 65+ Low/Medium Risk  Completed  Influenza Age 5 to Adult  Completed Introducing [address]! Dear Maggie Bain: Thank you for requesting a [employer] account. Our records indicate that you already have an active [employer] account. You can access your account anytime at [email or web address] Did you know that you can access your hospital and ER discharge instructions at any time in Life[employer]? You can also review all of your test results from your hospital stay or ER visit. Additional Information If you have questions, please visit the Frequently Asked Questions section of the [employer] website at [email or web address] Remember, DormNoise is NOT to be used for urgent needs. For medical emergencies, dial 911. Now available from your iPhone and Android! Please provide this summary of care documentation to your next provider. Your primary care clinician is listed as Omaira Durham. If you have any questions after today's visit, please call [phone].

## 2018-02-01 NOTE — PROGRESS NOTES
Willie Cooper is a 80 y.o. female and presents for annual Medicare Wellness Visit. Problem List: Reviewed with patient and discussed risk factors. Patient Active Problem List   Diagnosis Code    Arthritis of knee, right M17.11    Insomnia G47.00    Overactive bladder N32.81    AR (allergic rhinitis) J30.9    Osteoarthritis of multiple joints M15.9    GERD (gastroesophageal reflux disease) K21.9    Hypercholesterolemia E78.00    Pre-diabetes R73.03    Osteopenia M85.80    Unspecified vitamin D deficiency E55.9    Urinary incontinence R32    Advance care planning Z71.89       Current medical providers:  Patient Care Team:  Koko Rosas MD as PCP - General (Family Practice)    PSH: Reviewed with patient  Past Surgical History:   Procedure Laterality Date    HX ORTHOPAEDIC  10/99     R hip replacement >infected redone 4/00>10/00    HX ORTHOPAEDIC      LT KNEE ARTHROSCOPY    HX ORTHOPAEDIC  7/15/13    R knee replacement    HX TONSIL AND ADENOIDECTOMY      age 9        SH: Reviewed with patient  Social History   Substance Use Topics    Smoking status: Never Smoker    Smokeless tobacco: Never Used    Alcohol use Yes      Comment: 3 glasses of wine per night       FH: Reviewed with patient  Family History   Problem Relation Age of Onset    Psychiatric Disorder Daughter      drug overdose    Cancer Sister      lung       Medications/Allergies: Reviewed with patient  Current Outpatient Prescriptions on File Prior to Visit   Medication Sig Dispense Refill    rosuvastatin (CRESTOR) 20 mg tablet TAKE 1 TABLET BY MOUTH EVERY NIGHT 90 Tab 0    folic acid (FOLVITE) 1 mg tablet Take 1 Tab by mouth daily. 90 Tab 1    clindamycin (CLEOCIN) 150 mg capsule TAKE FOUR CAPSULES ONE HOUR PRIOR TO APPOINTMENT  3    vitamin k 100 mcg tablet Take 100 mcg by mouth daily.  VIT B2/NIACIN/B-6/B-12/D-PANTH (VIT [id number] ) by SubLINGual route.       calcium-vitamin D (CALCIUM+D) 500 mg(1,250mg) -200 unit per tablet Take 1 Tab by mouth two (2) times daily (with meals).  cyanocobalamin (VITAMIN B-12) 100 mcg tablet Take 100 mcg by mouth daily. No current facility-administered medications on file prior to visit. Allergies   Allergen Reactions    Morphine Nausea and Vomiting    Codeine Nausea and Vomiting     GI upset    Gentamicin Other (comments)    Hydrocodone Nausea and Vomiting    Iodine Rash     fever    Pcn [Penicillins] Other (comments)     childhood    Tetracycline Other (comments)     Change of mental status    Tramadol Nausea and Vomiting    Fish Oil [Omega 3-Dha-Epa-Fish Oil] Other (comments)     Belching consistently        Objective:  Visit Vitals    /76 (BP 1 Location: Right arm, BP Patient Position: Sitting)    Pulse 76    Temp 97.7 °F (36.5 °C) (Oral)    Resp 18    Ht 5' 4\" (1.626 m)    Wt 168 lb 3.2 oz (76.3 kg)    SpO2 96%    BMI 28.87 kg/m2    Body mass index is 28.87 kg/(m^2). Assessment of cognitive impairment: Alert and oriented x 3    Depression Screen:   PHQ over the last two weeks 2/1/2018   Little interest or pleasure in doing things Not at all   Feeling down, depressed or hopeless Not at all   Total Score PHQ 2 0       Fall Risk Assessment:    Fall Risk Assessment, last 12 mths 2/1/2018   Able to walk? Yes   Fall in past 12 months? No       Functional Ability:   Does the patient exhibit a steady gait? yes   How long did it take the patient to get up and walk from a sitting position? 3 secs   Is the patient self reliant?  (ie can do own laundry, meals, household chores)  yes     Does the patient handle his/her own medications? yes     Does the patient handle his/her own money? yes     Is the patients home safe (ie good lighting, handrails on stairs and bath, etc.)? yes     Did you notice or did patient express any hearing difficulties?    no     Did you notice or did patient express any vision difficulties?   no     Were distance and reading eye charts used? no       Advance Care Planning:   Patient was offered the opportunity to discuss advance care planning:  yes     Does patient have an Advance Directive:  yes   If no, did you provide information on Caring Connections? In chart       Plan:      Orders Placed This Encounter    DEXA BONE DENSITY STUDY AXIAL    HEMOGLOBIN A1C WITH EAG    METABOLIC PANEL, COMPREHENSIVE    LIPID PANEL    alendronate (FOSAMAX) 35 mg tablet    zolpidem (AMBIEN) 5 mg tablet    cholecalciferol, vitamin D3, (VITAMIN D3) 2,000 unit tab       Health Maintenance   Topic Date Due    GLAUCOMA SCREENING Q2Y  07/08/2016    MEDICARE YEARLY EXAM  02/02/2019    DTaP/Tdap/Td series (2 - Td) 12/14/2025    OSTEOPOROSIS SCREENING (DEXA)  Completed    ZOSTER VACCINE AGE 60>  Completed    Pneumococcal 65+ Low/Medium Risk  Completed    Influenza Age 5 to Adult  Completed       *Patient verbalized understanding and agreement with the plan. A copy of the After Visit Summary with personalized health plan was given to the patient today.

## 2018-02-01 NOTE — PROGRESS NOTES
Chief Complaint   Patient presents with    Medication Refill     ambien, alendronate     1. Have you been to the ER, urgent care clinic since your last visit? Hospitalized since your last visit? No    2. Have you seen or consulted any other health care providers outside of the [address] [place] [address] since your last visit? Include any pap smears or colon screening.  No

## 2018-02-01 NOTE — LETTER
2/24/2018 2:12 PM 
 
Ms. Jihan Orellana [phone] [id number] Dear Jihan Quinonez: 
 
Please find your most recent results below. Resulted Orders HEMOGLOBIN A1C WITH EAG Result Value Ref Range Hemoglobin A1c 5.9 (H) 4.8 - 5.6 % Comment:  
            Pre-diabetes: 5.7 - 6.4 Diabetes: >6.4 Glycemic control for adults with diabetes: <7.0 Estimated average glucose 123 mg/dL Narrative Performed at:  [address]  [id number] : Mauri Caal MD, Phone:  [phone] METABOLIC PANEL, COMPREHENSIVE Result Value Ref Range Glucose 118 (H) 65 - 99 mg/dL BUN 19 8 - 27 mg/dL Creatinine 0.89 0.57 - 1.00 mg/dL GFR est non-AA 60 >59 mL/min/1.73 GFR est AA 69 >59 mL/min/1.73  
 BUN/Creatinine ratio 21 12 - 28 Sodium 137 134 - 144 mmol/L Potassium 4.4 3.5 - 5.2 mmol/L Chloride 97 96 - 106 mmol/L  
 CO2 24 18 - 29 mmol/L Calcium 10.2 8.7 - 10.3 mg/dL Protein, total 7.5 6.0 - 8.5 g/dL Albumin 4.6 3.5 - 4.7 g/dL GLOBULIN, TOTAL 2.9 1.5 - 4.5 g/dL A-G Ratio 1.6 1.2 - 2.2 Bilirubin, total 0.9 0.0 - 1.2 mg/dL Alk. phosphatase 95 39 - 117 IU/L  
 AST (SGOT) 27 0 - 40 IU/L  
 ALT (SGPT) 27 0 - 32 IU/L Narrative Performed at:  [address]  [id number] : Mauri Caal MD, Phone:  [phone] LIPID PANEL Result Value Ref Range Cholesterol, total 219 (H) 100 - 199 mg/dL Triglyceride 80 0 - 149 mg/dL HDL Cholesterol 94 >39 mg/dL VLDL, calculated 16 5 - 40 mg/dL LDL, calculated 109 (H) 0 - 99 mg/dL Narrative Performed at:  [address]  [id number] : Mauri Caal MD, Phone:  [phone] CVD REPORT Result Value Ref Range INTERPRETATION Note Comment:  
   Supplemental report is available. Narrative Performed at:  [address] [place] [address]  [id number] : Giovanni Chaney PhD, Phone:  [phone] RECOMMENDATIONS: 
Hemoglobin A1C (average blood sugar level for past 3 months) is in the pre diabetes range, which means your average sugar or glucose level is higher than normal. I would encourage healthy diets and regular exercise with the goal of maintaining a healthy weight before starting medications for this. Elevated total and LDL cholesterol but continue current statin dose. Please call me if you have any questions: [phone] Sincerely, Keyur Grimm MD

## 2018-02-05 ENCOUNTER — TELEPHONE (OUTPATIENT)
Dept: FAMILY MEDICINE CLINIC | Age: 83
End: 2018-02-05

## 2018-02-05 NOTE — TELEPHONE ENCOUNTER
Call to pharmacy he states he spoke with insurance company and they stated they will not pay for patient's alendronate because it is not on the market anymore.  Pharmacist states that he can order it but patient's insurance will not cover it

## 2018-02-05 NOTE — TELEPHONE ENCOUNTER
Pharmacy on file verified  Pharmacy is requesting a different prescription for the following: Alendronate 35 mg tablets ( her insurance won't pay for this anymore; says its been discontinued. Can you change to something else?   Teresa Moreno  02/05/18

## 2018-02-05 NOTE — TELEPHONE ENCOUNTER
Okay for patient to stop the Fosamax right now. She has an upcoming DEXA scan scheduled so would like to reassess with the test results before additional medication recommendations.

## 2018-02-07 LAB
ALBUMIN SERPL-MCNC: 4.6 G/DL (ref 3.5–4.7)
ALBUMIN/GLOB SERPL: 1.6 {RATIO} (ref 1.2–2.2)
ALP SERPL-CCNC: 95 IU/L (ref 39–117)
ALT SERPL-CCNC: 27 IU/L (ref 0–32)
AST SERPL-CCNC: 27 IU/L (ref 0–40)
BILIRUB SERPL-MCNC: 0.9 MG/DL (ref 0–1.2)
BUN SERPL-MCNC: 19 MG/DL (ref 8–27)
BUN/CREAT SERPL: 21 (ref 12–28)
CALCIUM SERPL-MCNC: 10.2 MG/DL (ref 8.7–10.3)
CHLORIDE SERPL-SCNC: 97 MMOL/L (ref 96–106)
CHOLEST SERPL-MCNC: 219 MG/DL (ref 100–199)
CO2 SERPL-SCNC: 24 MMOL/L (ref 18–29)
CREAT SERPL-MCNC: 0.89 MG/DL (ref 0.57–1)
EST. AVERAGE GLUCOSE BLD GHB EST-MCNC: 123 MG/DL
GFR SERPLBLD CREATININE-BSD FMLA CKD-EPI: 60 ML/MIN/1.73
GFR SERPLBLD CREATININE-BSD FMLA CKD-EPI: 69 ML/MIN/1.73
GLOBULIN SER CALC-MCNC: 2.9 G/DL (ref 1.5–4.5)
GLUCOSE SERPL-MCNC: 118 MG/DL (ref 65–99)
HBA1C MFR BLD: 5.9 % (ref 4.8–5.6)
HDLC SERPL-MCNC: 94 MG/DL
INTERPRETATION, 910389: NORMAL
LDLC SERPL CALC-MCNC: 109 MG/DL (ref 0–99)
POTASSIUM SERPL-SCNC: 4.4 MMOL/L (ref 3.5–5.2)
PROT SERPL-MCNC: 7.5 G/DL (ref 6–8.5)
SODIUM SERPL-SCNC: 137 MMOL/L (ref 134–144)
TRIGL SERPL-MCNC: 80 MG/DL (ref 0–149)
VLDLC SERPL CALC-MCNC: 16 MG/DL (ref 5–40)

## 2018-02-08 NOTE — TELEPHONE ENCOUNTER
Call to patient.  verified. Informed patient to stop fosamax. Will reassess need for med after results of dexa.  Patient understands

## 2018-02-09 ENCOUNTER — HOSPITAL ENCOUNTER (OUTPATIENT)
Dept: MAMMOGRAPHY | Age: 83
Discharge: HOME | End: 2018-02-09
Attending: FAMILY MEDICINE
Payer: MEDICARE

## 2018-02-09 DIAGNOSIS — Z78.0 POSTMENOPAUSAL: ICD-10-CM

## 2018-02-09 DIAGNOSIS — Z13.820 SCREENING FOR OSTEOPOROSIS: ICD-10-CM

## 2018-02-09 PROCEDURE — 77080 DXA BONE DENSITY AXIAL: CPT

## 2018-02-13 NOTE — PROGRESS NOTES
Please notify patient regarding their test results:    DEXA shows osteopenia, pre-cursor to osteoporosis. Patient's current FRAX score shows a 13% 10 year probability of major osteoporotic fracture and a 3.5% 10 year probability of a hip fracture, therefore pt would met criteria for osteoporosis treatment. If pt has been tolerating Fosamax 35 mg well, would recommend increasing it to 70 mg (which is treatment dose; prior dose was for prevention, which is possibly why it was not covered by insurance). Please pend order if pt amenable to medication recommendations. For osteopenia, it is recommended to do weight-bearing exercises, avoid smoking, and to get at least vitamin D3 800 units/day and calcium at 1500 mg/day.

## 2018-02-13 NOTE — PROGRESS NOTES
Please notify patient regarding their test results:    Hemoglobin A1C (average blood sugar level for past 3 months) is in the pre diabetes range, which means your average sugar or glucose level is higher than normal. I would encourage healthy diets and regular exercise with the goal of maintaining a healthy weight before starting medications for this. Elevated total and LDL cholesterol but continue current statin dose.

## 2018-02-16 NOTE — PROGRESS NOTES
Outbound call to patient. Left message for patient to return call to the office in regards to results.

## 2018-02-19 NOTE — PROGRESS NOTES
Call to patient.  verified. Informed patient of results and recommendations. She understands.  She is going to call insurance company to see if 70mg Fosamax is covered

## 2018-05-15 DIAGNOSIS — M81.0 OSTEOPOROSIS, UNSPECIFIED OSTEOPOROSIS TYPE, UNSPECIFIED PATHOLOGICAL FRACTURE PRESENCE: ICD-10-CM

## 2018-05-23 RX ORDER — ALENDRONATE SODIUM 70 MG/1
70 TABLET ORAL
Qty: 12 TAB | Refills: 3 | Status: SHIPPED | OUTPATIENT
Start: 2018-05-23 | End: 2019-05-16 | Stop reason: SDUPTHER

## 2018-10-22 RX ORDER — FOLIC ACID 1 MG/1
TABLET ORAL
Qty: 90 TAB | Refills: 0 | Status: SHIPPED | OUTPATIENT
Start: 2018-10-22 | End: 2020-07-28

## 2019-03-26 DIAGNOSIS — E78.5 HYPERLIPIDEMIA, UNSPECIFIED HYPERLIPIDEMIA TYPE: ICD-10-CM

## 2019-03-26 RX ORDER — ROSUVASTATIN CALCIUM 20 MG/1
TABLET, COATED ORAL
Qty: 90 TAB | Refills: 0 | Status: SHIPPED | OUTPATIENT
Start: 2019-03-26 | End: 2019-06-26 | Stop reason: SDUPTHER

## 2019-04-05 NOTE — TELEPHONE ENCOUNTER
Outbound call to patient. Patient over due for AWV appointment.      Patient scheduled for Thursday, April 25, 2019 10:30 AM

## 2019-04-16 ENCOUNTER — TELEPHONE (OUTPATIENT)
Dept: FAMILY MEDICINE CLINIC | Age: 84
End: 2019-04-16

## 2019-04-16 RX ORDER — NICOTINE 11MG/24HR
PATCH, TRANSDERMAL 24 HOURS TRANSDERMAL
Qty: 90 CAP | Refills: 1 | Status: SHIPPED | OUTPATIENT
Start: 2019-04-16 | End: 2020-07-28 | Stop reason: SDUPTHER

## 2019-04-25 ENCOUNTER — OFFICE VISIT (OUTPATIENT)
Dept: FAMILY MEDICINE CLINIC | Age: 84
End: 2019-04-25

## 2019-04-25 VITALS
HEART RATE: 80 BPM | OXYGEN SATURATION: 96 % | RESPIRATION RATE: 16 BRPM | TEMPERATURE: 97.7 F | BODY MASS INDEX: 28.75 KG/M2 | WEIGHT: 168.4 LBS | HEIGHT: 64 IN | DIASTOLIC BLOOD PRESSURE: 85 MMHG | SYSTOLIC BLOOD PRESSURE: 138 MMHG

## 2019-04-25 DIAGNOSIS — Z00.00 WELCOME TO MEDICARE PREVENTIVE VISIT: Primary | ICD-10-CM

## 2019-04-25 DIAGNOSIS — E78.5 HYPERLIPIDEMIA, UNSPECIFIED HYPERLIPIDEMIA TYPE: ICD-10-CM

## 2019-04-25 DIAGNOSIS — Z23 ENCOUNTER FOR IMMUNIZATION: ICD-10-CM

## 2019-04-25 DIAGNOSIS — M81.0 OSTEOPOROSIS, UNSPECIFIED OSTEOPOROSIS TYPE, UNSPECIFIED PATHOLOGICAL FRACTURE PRESENCE: ICD-10-CM

## 2019-04-25 DIAGNOSIS — Z12.83 SKIN CANCER SCREENING: ICD-10-CM

## 2019-04-25 DIAGNOSIS — R73.03 PRE-DIABETES: ICD-10-CM

## 2019-04-25 NOTE — PATIENT INSTRUCTIONS
Medicare Wellness Visit, Female The best way to live healthy is to have a lifestyle where you eat a well-balanced diet, exercise regularly, limit alcohol use, and quit all forms of tobacco/nicotine, if applicable. Regular preventive services are another way to keep healthy. Preventive services (vaccines, screening tests, monitoring & exams) can help personalize your care plan, which helps you manage your own care. Screening tests can find health problems at the earliest stages, when they are easiest to treat. [facility] follows the current, evidence-based guidelines published by the [facility]il[facility] ([facility]) when recommending preventive services for our patients. Because we follow these guidelines, sometimes recommendations change over time as research supports it. (For example, mammograms used to be recommended annually. Even though Medicare will still pay for an annual mammogram, the newer guidelines recommend a mammogram every two years for women of average risk.) Of course, you and your doctor may decide to screen more often for some diseases, based on your risk and your health status. Preventive services for you include: - Medicare offers their members a free annual wellness visit, which is time for you and your primary care provider to discuss and plan for your preventive service needs. Take advantage of this benefit every year! 
-All adults over the age of 72 should receive the recommended pneumonia vaccines. Current USPSTF guidelines recommend a series of two vaccines for the best pneumonia protection.  
-All adults should have a flu vaccine yearly and a tetanus vaccine every 10 years. All adults age 61 and older should receive a shingles vaccine once in their lifetime.   
-A bone mass density test is recommended when a woman turns 65 to screen for osteoporosis. This test is only recommended one time, as a screening. Some providers will use this same test as a disease monitoring tool if you already have osteoporosis. -All adults age 38-68 who are overweight should have a diabetes screening test once every three years.  
-Other screening tests and preventive services for persons with diabetes include: an eye exam to screen for diabetic retinopathy, a kidney function test, a foot exam, and stricter control over your cholesterol.  
-Cardiovascular screening for adults with routine risk involves an electrocardiogram (ECG) at intervals determined by your doctor.  
-Colorectal cancer screenings should be done for adults age 54-65 with no increased risk factors for colorectal cancer. There are a number of acceptable methods of screening for this type of cancer. Each test has its own benefits and drawbacks. Discuss with your doctor what is most appropriate for you during your annual wellness visit. The different tests include: colonoscopy (considered the best screening method), a fecal occult blood test, a fecal DNA test, and sigmoidoscopy. -Breast cancer screenings are recommended every other year for women of normal risk, age 54-69. 
-Cervical cancer screenings for women over age 72 are only recommended with certain risk factors.  
-All adults born between [facility] should be screened once for Hepatitis C. Here is a list of your current Health Maintenance items (your personalized list of preventive services) with a due date: 
Health Maintenance Due Topic Date Due  Shingles Vaccine (1 of 2) 08/15/1984  Glaucoma Screening   07/08/2016 [facility] Annual Well Visit  02/02/2019

## 2019-04-25 NOTE — PROGRESS NOTES
Chief Complaint Patient presents with [facility] Annual Wellness Visit  1. Have you been to the ER, urgent care clinic since your last visit? Hospitalized since your last visit? No 
 
2. Have you seen or consulted any other health care providers outside of the [address] [place] [address] since your last visit? Include any pap smears or colon screening.  No

## 2019-04-25 NOTE — PROGRESS NOTES
Patient Name: Augie Dozier MRN: [id number] SUBJECTIVE Augie Dozier is a 80 y.o. female who presents with the following: Tolerating Fosamax 70 mg week. On statin for hyperlipidemia without complaints. Does not have a dermatologist for regular skin cancer screening but does have numerous age spots throughout all of her skin. Otherwise feels great with no new complaints. Wt Readings from Last 3 Encounters:  
04/25/19 168 lb 6.4 oz (76.4 kg) 02/01/18 168 lb 3.2 oz (76.3 kg) 01/05/17 158 lb 4 oz (71.8 kg) Review of Systems Constitutional: Negative for fever, malaise/fatigue and weight loss. Respiratory: Negative for cough, hemoptysis, shortness of breath and wheezing. Cardiovascular: Negative for chest pain, palpitations, leg swelling and PND. Gastrointestinal: Negative for abdominal pain, constipation, diarrhea, nausea and vomiting. The patient's medications, allergies, past medical history, surgical history, family history and social history were reviewed and updated where appropriate. Prior to Admission medications Medication Sig Start Date End Date Taking? Authorizing Provider VITAMIN D3 2,000 unit cap capsule TAKE 1 TABLET BY MOUTH EVERY DAY 4/16/19  Yes Chloe Reynaga Arm, MD  
rosuvastatin (CRESTOR) 20 mg tablet TAKE 1 TABLET BY MOUTH EVERY NIGHT 3/26/19  Yes Kishan Coronel MD  
folic acid (FOLVITE) 1 mg tablet TAKE 1 TABLET BY MOUTH DAILY 10/22/18  Yes Rita Wilson NP  
alendronate (FOSAMAX) 70 mg tablet Take 1 Tab by mouth every seven (7) days.  5/23/18  Yes Rita Wilson NP  
zolpidem (AMBIEN) 5 mg tablet TAKE 1/2 TABLET AT BEDTIME AS NEEDED FOR INSOMNIA 2/1/18  Yes Chloe Reynaga Arm, MD  
clindamycin (CLEOCIN) 150 mg capsule TAKE FOUR CAPSULES ONE HOUR PRIOR TO APPOINTMENT 10/19/16  Yes Provider, Historical  
calcium-vitamin D (CALCIUM+D) 500 mg(1,250mg) -200 unit per tablet Take 1 Tab by mouth two (2) times daily (with meals). Yes Provider, Historical  
cyanocobalamin (VITAMIN B-12) 100 mcg tablet Take 100 mcg by mouth daily. Yes Provider, Historical  
vitamin k 100 mcg tablet Take 100 mcg by mouth daily. Provider, Historical  
VIT B2/NIACIN/B-6/B-12/D-PANTH (VIT [id number] SL) by SubLINGual route. Provider, Historical  
 
 
Allergies Allergen Reactions  Morphine Nausea and Vomiting  Codeine Nausea and Vomiting GI upset  Gentamicin Other (comments)  Hydrocodone Nausea and Vomiting  Iodine Rash  
  fever  Pcn [Penicillins] Other (comments) childhood  Tetracycline Other (comments) Change of mental status  Tramadol Nausea and Vomiting  Fish Oil [Omega 3-Dha-Epa-Fish Oil] Other (comments) Belching consistently OBJECTIVE Visit Vitals /85 (BP 1 Location: Right arm, BP Patient Position: Sitting) Pulse 80 Temp 97.7 °F (36.5 °C) (Oral) Resp 16 Ht 5' 4\" (1.626 m) Wt 168 lb 6.4 oz (76.4 kg) SpO2 96% BMI 28.91 kg/m² Physical Exam  
Constitutional: She is oriented to person, place, and time and well-developed, well-nourished, and in no distress. No distress. HENT:  
Head: Normocephalic. Right Ear: External ear normal.  
Left Ear: External ear normal.  
Eyes: Pupils are equal, round, and reactive to light. Conjunctivae and EOM are normal.  
Cardiovascular: Normal rate, regular rhythm, normal heart sounds and intact distal pulses. Exam reveals no gallop and no friction rub. No murmur heard. Pulmonary/Chest: Effort normal and breath sounds normal. No respiratory distress. She has no wheezes. She has no rales. Abdominal: Soft. Bowel sounds are normal. She exhibits no distension. There is no tenderness. There is no rebound and no guarding. Neurological: She is alert and oriented to person, place, and time. Skin: Skin is warm and dry. She is not diaphoretic. Numerous hyper pigmented macules and seborrheic keratosis on bilateral upper arms and back Psychiatric: Memory, affect and judgment normal.  
 
 
ASSESSMENT AND PLAN Saima Hayward is a 80 y.o. female who presents today for: 
 
1. Welcome to Medicare preventive visit See other note. 2. Hyperlipidemia, unspecified hyperlipidemia type Will calculate ASCVD risk score pending labs. I have reviewed/discussed the above normal BMI with the patient. I have recommended the following interventions: dietary management education, guidance, and counseling, encourage exercise, monitor weight and prescribed dietary intake. - LIPID PANEL 
- METABOLIC PANEL, COMPREHENSIVE 3. Osteoporosis, unspecified osteoporosis type, unspecified pathological fracture presence Stable, continue current treatment. - VITAMIN D, 25 HYDROXY 4. Pre-diabetes 
- CBC W/O DIFF 
- HEMOGLOBIN A1C WITH EAG 5. Skin cancer screening Encourage pt to establish care with derm given fair skin and age. - REFERRAL TO DERMATOLOGY 6. Encounter for immunization 
- varicella-zoster recombinant, PF, (SHINGRIX, PF,) 50 mcg/0.5 mL susr injection; 0.5 mL by IntraMUSCular route once for 1 dose. Please fax vaccination documentation to [facility] at [phone], Attn: Dr. Vincent Dunn: 0.5 mL; Refill: 0 Medications Discontinued During This Encounter Medication Reason  VIT B2/NIACIN/B-6/B-12/D-PANTH (VIT [id number] SL)  vitamin k 100 mcg tablet Follow-up and Dispositions · Return in about 1 year (around 4/25/2020) for Medicare Wellness Visit (30 min). Medication risks/benefits/costs/interactions/alternatives discussed with patient. Advised patient to call back or return to office if symptoms worsen/change/persist. If patient cannot reach us or should anything more severe/urgent arise he/she should proceed directly to the nearest emergency department. Discussed expected course/resolution/complications of diagnosis in detail with patient. Patient given a written after visit summary which includes his/her diagnoses, current medications and vitals. Patient expressed understanding with the diagnosis and plan. Omaira Dc M.D.

## 2019-04-25 NOTE — PROGRESS NOTES
This is the Subsequent Medicare Annual Wellness Exam, performed 12 months or more after the Initial AWV or the last Subsequent AWV I have reviewed the patient's medical history in detail and updated the computerized patient record. History Past Medical History:  
Diagnosis Date  AR (allergic rhinitis)  DJD (degenerative joint disease)  GERD (gastroesophageal reflux disease)  Hypercholesterolemia  Insomnia 11/13/2013  Osteopenia 11/2010  
 repeat DEXA 2017; on Fosamax 35 mg weekly  Pre-diabetes  Unspecified vitamin D deficiency 2009  Urinary incontinence   
 mild Past Surgical History:  
Procedure Laterality Date  HX ORTHOPAEDIC  10/99 R hip replacement >infected redone 4/00>10/00  
 HX ORTHOPAEDIC    
 LT KNEE ARTHROSCOPY  
 HX ORTHOPAEDIC  7/15/13 R knee replacement  HX TONSIL AND ADENOIDECTOMY    
 age 9 Current Outpatient Medications Medication Sig Dispense Refill  varicella-zoster recombinant, PF, (SHINGRIX, PF,) 50 mcg/0.5 mL susr injection 0.5 mL by IntraMUSCular route once for 1 dose. Please fax vaccination documentation to [facility] at [phone], Attn: Dr. Keri Artis 0.5 mL 0  
 VITAMIN D3 2,000 unit cap capsule TAKE 1 TABLET BY MOUTH EVERY DAY 90 Cap 1  rosuvastatin (CRESTOR) 20 mg tablet TAKE 1 TABLET BY MOUTH EVERY NIGHT 90 Tab 0  
 folic acid (FOLVITE) 1 mg tablet TAKE 1 TABLET BY MOUTH DAILY 90 Tab 0  
 alendronate (FOSAMAX) 70 mg tablet Take 1 Tab by mouth every seven (7) days. 12 Tab 3  
 zolpidem (AMBIEN) 5 mg tablet TAKE 1/2 TABLET AT BEDTIME AS NEEDED FOR INSOMNIA 30 Tab 0  
 clindamycin (CLEOCIN) 150 mg capsule TAKE FOUR CAPSULES ONE HOUR PRIOR TO APPOINTMENT  3  
 calcium-vitamin D (CALCIUM+D) 500 mg(1,250mg) -200 unit per tablet Take 1 Tab by mouth two (2) times daily (with meals).  cyanocobalamin (VITAMIN B-12) 100 mcg tablet Take 100 mcg by mouth daily. Allergies Allergen Reactions  Morphine Nausea and Vomiting  Codeine Nausea and Vomiting GI upset  Gentamicin Other (comments)  Hydrocodone Nausea and Vomiting  Iodine Rash  
  fever  Pcn [Penicillins] Other (comments) childhood  Tetracycline Other (comments) Change of mental status  Tramadol Nausea and Vomiting  Fish Oil [Omega 3-Dha-Epa-Fish Oil] Other (comments) Belching consistently Family History Problem Relation Age of Onset  Psychiatric Disorder Daughter   
     drug overdose  Cancer Sister   
     lung Social History Tobacco Use  Smoking status: Never Smoker  Smokeless tobacco: Never Used Substance Use Topics  Alcohol use: Yes Comment: 1-2 glasses of wine per night Patient Active Problem List  
Diagnosis Code  Arthritis of knee, right M17.11  
 Insomnia G47.00  
 Overactive bladder N32.81  
 AR (allergic rhinitis) J30.9  Osteoarthritis of multiple joints M15.9  GERD (gastroesophageal reflux disease) K21.9  Hypercholesterolemia E78.00  Pre-diabetes R73.03  
 Osteopenia M85.80  Unspecified vitamin D deficiency E55.9  Urinary incontinence R32  Advance care planning Z71.89 Depression Risk Factor Screening:  
 
3 most recent PHQ Screens 4/25/2019 Little interest or pleasure in doing things Not at all Feeling down, depressed, irritable, or hopeless Not at all Total Score PHQ 2 0 Alcohol Risk Factor Screening: On any occasion in the past three months you have had more than 3 drinks containing alcohol. Functional Ability and Level of Safety:  
Hearing Loss Hearing is good. Activities of Daily Living The home contains: no safety equipment. Patient does total self care Fall Risk Fall Risk Assessment, last 12 mths 4/25/2019 Able to walk? Yes Fall in past 12 months? No  
 
 
Abuse Screen Patient is not abused Cognitive Screening Evaluation of Cognitive Function: Has your family/caregiver stated any concerns about your memory: no 
 
 
Patient Care Team  
Patient Care Team: 
Augustina Strong MD as PCP - [facility]) Assessment/Plan Education and counseling provided: 
Are appropriate based on today's review and evaluation Diagnoses and all orders for this visit: 
 
1. Welcome to Medicare preventive visit 2. Hyperlipidemia, unspecified hyperlipidemia type -     LIPID PANEL 
-     METABOLIC PANEL, COMPREHENSIVE 3. Osteoporosis, unspecified osteoporosis type, unspecified pathological fracture presence 
-     VITAMIN D, 25 HYDROXY 4. Pre-diabetes 
-     CBC W/O DIFF 
-     HEMOGLOBIN A1C WITH EAG 5. Skin cancer screening 
-     REFERRAL TO DERMATOLOGY 6. Encounter for immunization 
-     varicella-zoster recombinant, PF, (SHINGRIX, PF,) 50 mcg/0.5 mL susr injection; 0.5 mL by IntraMUSCular route once for 1 dose. Please fax vaccination documentation to [facility] at [phone], Attn: Dr. Jake Olvera Health Maintenance Due Topic Date Due  Shingrix Vaccine Age 50> (1 of 2) 08/15/1984  GLAUCOMA SCREENING Q2Y  07/08/2016  MEDICARE YEARLY EXAM  02/02/2019

## 2019-04-26 LAB
25(OH)D3+25(OH)D2 SERPL-MCNC: 42.6 NG/ML (ref 30–100)
ALBUMIN SERPL-MCNC: 4.3 G/DL (ref 3.5–4.7)
ALBUMIN/GLOB SERPL: 1.6 {RATIO} (ref 1.2–2.2)
ALP SERPL-CCNC: 84 IU/L (ref 39–117)
ALT SERPL-CCNC: 23 IU/L (ref 0–32)
AST SERPL-CCNC: 28 IU/L (ref 0–40)
BILIRUB SERPL-MCNC: 1 MG/DL (ref 0–1.2)
BUN SERPL-MCNC: 17 MG/DL (ref 8–27)
BUN/CREAT SERPL: 20 (ref 12–28)
CALCIUM SERPL-MCNC: 9.5 MG/DL (ref 8.7–10.3)
CHLORIDE SERPL-SCNC: 99 MMOL/L (ref 96–106)
CHOLEST SERPL-MCNC: 204 MG/DL (ref 100–199)
CO2 SERPL-SCNC: 22 MMOL/L (ref 20–29)
CREAT SERPL-MCNC: 0.87 MG/DL (ref 0.57–1)
ERYTHROCYTE [DISTWIDTH] IN BLOOD BY AUTOMATED COUNT: 14 % (ref 12.3–15.4)
EST. AVERAGE GLUCOSE BLD GHB EST-MCNC: 131 MG/DL
GLOBULIN SER CALC-MCNC: 2.7 G/DL (ref 1.5–4.5)
GLUCOSE SERPL-MCNC: 105 MG/DL (ref 65–99)
HBA1C MFR BLD: 6.2 % (ref 4.8–5.6)
HCT VFR BLD AUTO: 41.5 % (ref 34–46.6)
HDLC SERPL-MCNC: 81 MG/DL
HGB BLD-MCNC: 14 G/DL (ref 11.1–15.9)
INTERPRETATION, 910389: NORMAL
LDLC SERPL CALC-MCNC: 104 MG/DL (ref 0–99)
MCH RBC QN AUTO: 31.7 PG (ref 26.6–33)
MCHC RBC AUTO-ENTMCNC: 33.7 G/DL (ref 31.5–35.7)
MCV RBC AUTO: 94 FL (ref 79–97)
PLATELET # BLD AUTO: 235 X10E3/UL (ref 150–379)
POTASSIUM SERPL-SCNC: 4.4 MMOL/L (ref 3.5–5.2)
PROT SERPL-MCNC: 7 G/DL (ref 6–8.5)
RBC # BLD AUTO: 4.41 X10E6/UL (ref 3.77–5.28)
SODIUM SERPL-SCNC: 138 MMOL/L (ref 134–144)
TRIGL SERPL-MCNC: 97 MG/DL (ref 0–149)
VLDLC SERPL CALC-MCNC: 19 MG/DL (ref 5–40)
WBC # BLD AUTO: 5.3 X10E3/UL (ref 3.4–10.8)

## 2019-04-26 NOTE — PROGRESS NOTES
Spoke to patient informed her of lab results and recommendations. Patient states she will follow all recommendations.

## 2019-04-26 NOTE — PROGRESS NOTES
Please notify patient regarding their test results: 
 
Hemoglobin A1C (average blood sugar level for past 3 months) is in the pre diabetes range, which means your average sugar or glucose level is higher than normal. I would encourage healthy diet and regular exercise with the goal of maintaining a healthy weight before starting medications for this. Total/LDL cholesterol mildly elevated, continue statin.  
Vitamin D levels are normal.

## 2019-04-30 NOTE — TELEPHONE ENCOUNTER
Pt  is calling to request a call back from the nurse or the doctor about his wife having two shingle shot with in the last two years and then she is to go back again to get another shot for the shingles. Then he has question about her Vitamin b12  Its telling her to stop taking this.  is un sure on what they need to stop taking and what to take.

## 2019-05-02 ENCOUNTER — TELEPHONE (OUTPATIENT)
Dept: FAMILY MEDICINE CLINIC | Age: 84
End: 2019-05-02

## 2019-05-02 NOTE — TELEPHONE ENCOUNTER
Pts  is calling requesting call back in regards to pts Physical results and shingles shots and some questions he has about her  He states he called on 4/30 and left message and have not heard from anyone  BCB# [phone]  LOV :  April 25, 2019

## 2019-05-02 NOTE — TELEPHONE ENCOUNTER
Outbound call to patients  who is on PHI. Patient  was concerned about the patients shingles vaccines that she received a couple of years ago. Advised patients  of the difference between the Zostavax (old shingles vaccine) verse the Shingrix (new shingles vaccine). Patients  verbalized understanding.

## 2019-05-16 DIAGNOSIS — M81.0 OSTEOPOROSIS, UNSPECIFIED OSTEOPOROSIS TYPE, UNSPECIFIED PATHOLOGICAL FRACTURE PRESENCE: ICD-10-CM

## 2019-05-16 RX ORDER — ALENDRONATE SODIUM 70 MG/1
70 TABLET ORAL
Qty: 12 TAB | Refills: 3 | Status: SHIPPED | OUTPATIENT
Start: 2019-05-16 | End: 2020-05-07

## 2019-05-16 NOTE — TELEPHONE ENCOUNTER
Pharmacy requesting medication refill     Requested Prescriptions     Pending Prescriptions Disp Refills    alendronate (FOSAMAX) 70 mg tablet 12 Tab 3     Sig: Take 1 Tab by mouth every seven (7) days.      Pharmacy on file verified  [phone])

## 2020-05-07 DIAGNOSIS — M81.0 OSTEOPOROSIS, UNSPECIFIED OSTEOPOROSIS TYPE, UNSPECIFIED PATHOLOGICAL FRACTURE PRESENCE: ICD-10-CM

## 2020-05-07 RX ORDER — ALENDRONATE SODIUM 70 MG/1
TABLET ORAL
Qty: 12 TAB | Refills: 3 | Status: SHIPPED | OUTPATIENT
Start: 2020-05-07 | End: 2021-05-20

## 2020-07-28 ENCOUNTER — VIRTUAL VISIT (OUTPATIENT)
Dept: FAMILY MEDICINE CLINIC | Age: 85
End: 2020-07-28

## 2020-07-28 DIAGNOSIS — E78.5 HYPERLIPIDEMIA, UNSPECIFIED HYPERLIPIDEMIA TYPE: ICD-10-CM

## 2020-07-28 DIAGNOSIS — R73.03 PRE-DIABETES: ICD-10-CM

## 2020-07-28 DIAGNOSIS — M81.0 OSTEOPOROSIS, UNSPECIFIED OSTEOPOROSIS TYPE, UNSPECIFIED PATHOLOGICAL FRACTURE PRESENCE: ICD-10-CM

## 2020-07-28 DIAGNOSIS — R41.3 MEMORY DEFICIT: Primary | ICD-10-CM

## 2020-07-28 RX ORDER — ACETAMINOPHEN 500 MG
TABLET ORAL
Qty: 90 CAP | Refills: 3 | Status: SHIPPED | OUTPATIENT
Start: 2020-07-28

## 2020-07-28 NOTE — PROGRESS NOTES
Kayli Alonso, who was evaluated through a synchronous (real-time) audio-video encounter, and/or her healthcare decision maker, is aware that it is a billable service, with coverage as determined by her insurance carrier. She provided verbal consent to proceed: YES, and patient identification was verified. It was conducted pursuant to the emergency declaration under the [facility], [facility] authority and the [employer] General Act. A caregiver was present when appropriate. Ability to conduct physical exam was limited. I was at home. The patient was at home. This virtual visit was conducted via [employer]. Pursuant to the emergency declaration under the [facility], [address] [place] [address] authority and the [employer] Act, this Virtual  Visit was conducted to reduce the patient's risk of exposure to COVID-19 and provide continuity of care for an established patient. Services were provided through a video synchronous discussion virtually to substitute for in-person clinic visit. Due to this being a TeleHealth evaluation, many elements of the physical examination are unable to be assessed. Total Time: minutes: 21-30 minutes. Shola Bahena MD    104  Subjective:   Kayli Alonso was seen for Memory Loss    Patient's  and daughter are present during visit. They have noticed a significant decline in pt's memory over the past 3 years. She often requires repeating after 10 minutes and relies on sticky notes. Lives in a home with her . No longer drives although she wishes that she could. Denies any mood disorders, physical symptoms, or pain. Does not sleep well at night but naps during the day. Her mother has dementia and the family believes that Ms. Royal Bolaños has it as well. Hx of osteopenia, on Fosamax.   Has been out of her statin for one month. Prior to Admission medications    Medication Sig Start Date End Date Taking? Authorizing Provider   cholecalciferol (Vitamin D3) (2,000 UNITS /50 MCG) cap capsule TAKE 1 TABLET BY MOUTH EVERY DAY 7/28/20  Yes Bailey Juares MD   alendronate (FOSAMAX) 70 mg tablet TAKE 1 TABLET BY MOUTH EVERY 7 DAYS 5/7/20  Yes Dori Wilson NP   calcium-vitamin D (CALCIUM+D) 500 mg(1,250mg) -200 unit per tablet Take 1 Tab by mouth two (2) times daily (with meals). Yes Provider, Historical   rosuvastatin (CRESTOR) 20 mg tablet TAKE 1 TABLET BY MOUTH EVERY NIGHT 6/27/19   Bailey Juares MD   VITAMIN D3 2,000 unit cap capsule TAKE 1 TABLET BY MOUTH EVERY DAY 4/16/19 7/28/20  Bailey Juares MD   folic acid (FOLVITE) 1 mg tablet TAKE 1 TABLET BY MOUTH DAILY 10/22/18 7/28/20  Dori Wilson NP   zolpidem (AMBIEN) 5 mg tablet TAKE 1/2 TABLET AT BEDTIME AS NEEDED FOR INSOMNIA 2/1/18 7/28/20  Bailey Juares MD   clindamycin (CLEOCIN) 150 mg capsule TAKE FOUR CAPSULES ONE HOUR PRIOR TO APPOINTMENT 10/19/16 7/28/20  Provider, Historical   cyanocobalamin (VITAMIN B-12) 100 mcg tablet Take 100 mcg by mouth daily. 7/28/20  Provider, Historical       Allergies   Allergen Reactions    Morphine Nausea and Vomiting    Codeine Nausea and Vomiting     GI upset    Gentamicin Other (comments)    Hydrocodone Nausea and Vomiting    Iodine Rash     fever    Pcn [Penicillins] Other (comments)     childhood    Tetracycline Other (comments)     Change of mental status    Tramadol Nausea and Vomiting    Fish Oil [Omega 3-Dha-Epa-Fish Oil] Other (comments)     Belching consistently        Review of Systems   Constitutional: Negative for fever, malaise/fatigue and weight loss. Respiratory: Negative for cough, hemoptysis, shortness of breath and wheezing. Cardiovascular: Negative for chest pain, palpitations, leg swelling and PND.    Gastrointestinal: Negative for abdominal pain, constipation, diarrhea, nausea and vomiting. Neurological: Negative for headaches. Memory loss   Psychiatric/Behavioral: Negative for depression. The patient is not nervous/anxious. Physical Exam:     There were no vitals taken for this visit. General: alert, cooperative, no distress   Mental  status: normal mood, behavior, speech, dress, motor activity, and thought processes, able to follow commands   HENT: NCAT   Neck: no visualized mass   Resp: no respiratory distress   Neuro: no gross deficits   Skin: no discoloration or lesions of concern on visible areas   Psychiatric: normal affect, consistent with stated mood, no evidence of hallucinations       Assessment & Plan:     Lennie Francisco is a 80 y.o. female who presents today for:    1. Memory deficit  Suspect dementia. Will obtain labs first and consider head imaging. Recommend family to make neurology appt given long wait time. Agree with withholding from driving.  - REFERRAL TO NEUROLOGY  - CBC WITH AUTOMATED DIFF  - TSH AND FREE T4  - VITAMIN B12 & FOLATE    2. Hyperlipidemia, unspecified hyperlipidemia type  - METABOLIC PANEL, COMPREHENSIVE  - LIPID PANEL    3. Osteoporosis, unspecified osteoporosis type, unspecified pathological fracture presence  Consider drug holiday of Fosamax after updating DEXA. - DEXA BONE DENSITY STUDY AXIAL; Future  - cholecalciferol (Vitamin D3) (2,000 UNITS /50 MCG) cap capsule; TAKE 1 TABLET BY MOUTH EVERY DAY  Dispense: 90 Cap;  Refill: 3  - VITAMIN D, 25 HYDROXY    4. Pre-diabetes  - HEMOGLOBIN A1C WITH EAG    Medications Discontinued During This Encounter   Medication Reason    clindamycin (CLEOCIN) 150 mg capsule Not A Current Medication    folic acid (FOLVITE) 1 mg tablet Not A Current Medication    zolpidem (AMBIEN) 5 mg tablet Not A Current Medication    cyanocobalamin (VITAMIN B-12) 100 mcg tablet Not A Current Medication    VITAMIN D3 2,000 unit cap capsule Reorder        Treatment risks/benefits/costs/interactions/alternatives discussed with patient. Advised patient to call back or return to office if symptoms worsen/change/persist. If patient cannot reach us or should anything more severe/urgent arise he/she should proceed directly to the nearest emergency department. Discussed expected course/resolution/complications of diagnosis in detail with patient. Patient expressed understanding with the diagnosis and plan. Omaira Calix M.D.

## 2020-08-04 ENCOUNTER — HOSPITAL ENCOUNTER (OUTPATIENT)
Dept: MAMMOGRAPHY | Age: 85
Discharge: HOME | End: 2020-08-04
Attending: FAMILY MEDICINE
Payer: MEDICARE

## 2020-08-04 DIAGNOSIS — M81.0 OSTEOPOROSIS, UNSPECIFIED OSTEOPOROSIS TYPE, UNSPECIFIED PATHOLOGICAL FRACTURE PRESENCE: ICD-10-CM

## 2020-08-04 PROCEDURE — 77080 DXA BONE DENSITY AXIAL: CPT

## 2020-08-13 ENCOUNTER — OFFICE VISIT (OUTPATIENT)
Dept: NEUROLOGY | Facility: CLINIC | Age: 85
End: 2020-08-13
Payer: MEDICARE

## 2020-08-13 VITALS
BODY MASS INDEX: 28.68 KG/M2 | HEIGHT: 64 IN | DIASTOLIC BLOOD PRESSURE: 82 MMHG | HEART RATE: 74 BPM | WEIGHT: 168 LBS | OXYGEN SATURATION: 98 % | RESPIRATION RATE: 16 BRPM | SYSTOLIC BLOOD PRESSURE: 132 MMHG

## 2020-08-13 DIAGNOSIS — F02.80 MIXED DEMENTIA (HCC): Primary | ICD-10-CM

## 2020-08-13 DIAGNOSIS — F01.50 MIXED DEMENTIA (HCC): Primary | ICD-10-CM

## 2020-08-13 DIAGNOSIS — G30.9 MIXED DEMENTIA (HCC): Primary | ICD-10-CM

## 2020-08-13 PROCEDURE — G8427 DOCREV CUR MEDS BY ELIG CLIN: HCPCS | Performed by: PSYCHIATRY & NEUROLOGY

## 2020-08-13 PROCEDURE — G8536 NO DOC ELDER MAL SCRN: HCPCS | Performed by: PSYCHIATRY & NEUROLOGY

## 2020-08-13 PROCEDURE — G8510 SCR DEP NEG, NO PLAN REQD: HCPCS | Performed by: PSYCHIATRY & NEUROLOGY

## 2020-08-13 PROCEDURE — G8399 PT W/DXA RESULTS DOCUMENT: HCPCS | Performed by: PSYCHIATRY & NEUROLOGY

## 2020-08-13 PROCEDURE — G8419 CALC BMI OUT NRM PARAM NOF/U: HCPCS | Performed by: PSYCHIATRY & NEUROLOGY

## 2020-08-13 PROCEDURE — 99204 OFFICE O/P NEW MOD 45 MIN: CPT | Performed by: PSYCHIATRY & NEUROLOGY

## 2020-08-13 PROCEDURE — 1101F PT FALLS ASSESS-DOCD LE1/YR: CPT | Performed by: PSYCHIATRY & NEUROLOGY

## 2020-08-13 PROCEDURE — 1090F PRES/ABSN URINE INCON ASSESS: CPT | Performed by: PSYCHIATRY & NEUROLOGY

## 2020-08-13 RX ORDER — DONEPEZIL HYDROCHLORIDE 5 MG/1
5 TABLET, FILM COATED ORAL
Qty: 30 TAB | Refills: 0 | Status: SHIPPED | OUTPATIENT
Start: 2020-08-13 | End: 2020-08-13

## 2020-08-13 NOTE — PROGRESS NOTES
Chief Complaint   Patient presents with    Memory Loss       HISTORY OF PRESENT ILLNESS  Mariely Haro is a 80 y.o. female who came in for neurological consultation requested by Dr. Arn Moritz. She came along with her  and daughter. For the past year or so she has been very forgetful.  tells me that she will wake up in the morning, get herself coffee that he has made and will go to sit on her chair. She will stay there for an entire day with little to no physical activity. She can have reasonable meaningful conversations but repeats herself and does not remember what she had just talked about or what she is supposed to do. When she goes out to someplace she will not recall or remember where she is or why she came there. She is able to do simple basic activities such as taking a shower, dressing, changing clothes, fixing simple meals for herself. She sleeps most of the day and then stays up during the night. There are no behavioral issues or wandering. She has not been driving for quite some time now and does not handle money or financial accounts. Past Medical History:   Diagnosis Date    AR (allergic rhinitis)     DJD (degenerative joint disease)     GERD (gastroesophageal reflux disease)     Hypercholesterolemia     Insomnia 11/13/2013    Osteopenia 11/2010    repeat DEXA 2017; on Fosamax 35 mg weekly    Pre-diabetes     Unspecified vitamin D deficiency 2009    Urinary incontinence     mild     Current Outpatient Medications   Medication Sig    donepeziL (ARICEPT) 5 mg tablet Take 1 Tab by mouth nightly.  cholecalciferol (Vitamin D3) (2,000 UNITS /50 MCG) cap capsule TAKE 1 TABLET BY MOUTH EVERY DAY    alendronate (FOSAMAX) 70 mg tablet TAKE 1 TABLET BY MOUTH EVERY 7 DAYS    rosuvastatin (CRESTOR) 20 mg tablet TAKE 1 TABLET BY MOUTH EVERY NIGHT    calcium-vitamin D (CALCIUM+D) 500 mg(1,250mg) -200 unit per tablet Take 1 Tab by mouth two (2) times daily (with meals). No current facility-administered medications for this visit. Allergies   Allergen Reactions    Morphine Nausea and Vomiting    Codeine Nausea and Vomiting     GI upset    Gentamicin Other (comments)    Hydrocodone Nausea and Vomiting    Iodine Rash     fever    Pcn [Penicillins] Other (comments)     childhood    Tetracycline Other (comments)     Change of mental status    Tramadol Nausea and Vomiting    Fish Oil [Omega 3-Dha-Epa-Fish Oil] Other (comments)     Belching consistently      Family History   Problem Relation Age of Onset    Psychiatric Disorder Daughter         drug overdose    Cancer Sister         lung     Social History     Tobacco Use    Smoking status: Never Smoker    Smokeless tobacco: Never Used   Substance Use Topics    Alcohol use: Yes     Comment: 1-2 glasses of wine per night    Drug use: No     Past Surgical History:   Procedure Laterality Date    HX ORTHOPAEDIC  10/99     R hip replacement >infected redone 4/00>10/00    HX ORTHOPAEDIC      LT KNEE ARTHROSCOPY    HX ORTHOPAEDIC  7/15/13    R knee replacement    HX TONSIL AND ADENOIDECTOMY      age 9         REVIEW OF SYSTEMS  Review of Systems - History obtained from the patient  Psychological ROS: negative  ENT ROS: negative  Hematological and Lymphatic ROS: negative  Endocrine ROS: negative  Respiratory ROS: no cough, shortness of breath, or wheezing  Cardiovascular ROS: no chest pain or dyspnea on exertion  Gastrointestinal ROS: no abdominal pain, change in bowel habits, or black or bloody stools  Genito-Urinary ROS: no dysuria, trouble voiding, or hematuria  Musculoskeletal ROS: negative  Dermatological ROS: negative      PHYSICAL EXAMINATION:    Visit Vitals  /82   Pulse 74   Resp 16   Ht 5' 4\" (1.626 m)   Wt 76.2 kg (168 lb)   SpO2 98%   BMI 28.84 kg/m²     General:  Well nourished and groomed individual in no acute distress.     Neck: Supple, nontender, no bruits, no pain with resistance to active range of motion. Heart: Regular rate and rhythm. Normal S1S2. Lungs:  Equal chest expansion, no cough, no wheeze  Musculoskeletal:  Extremities revealed no edema and had full range of motion of joints. Psych:  Good mood and bright affect    NEUROLOGICAL EXAMINATION:     Mental Status:   Alert and oriented to person, place. She could not tell me today's date. She scored 18/30 on [place] cognitive assessment. She has difficulties with visuospatial function, naming, language fluency and word recall. Short-term recall was significantly impaired at 0/5. Cranial Nerves:    II, III, IV, VI:  Visual acuity grossly intact. Visual fields are normal.    Pupils are equal, round, and reactive to light and accommodation. Extra-ocular movements are full and fluid. Fundoscopic exam was benign, no ptosis or nystagmus. V-XII: Hearing is grossly intact. Facial features are symmetric, with normal sensation and strength. The palate rises symmetrically and the tongue protrudes midline. Sternocleidomastoids 5/5. Motor Examination: Normal tone, bulk, and strength. 5/5 muscle strength throughout. No cogwheel rigidity or clonus present. Sensory exam:  Normal throughout to pinprick, temperature, and vibration sense. Normal proprioception. Coordination:  Finger to nose and rapid arm movement testing was normal.   No resting or intention tremor    Gait and Station: [place] with a cane. Normal arm swing. No [place]om[place] or pronator drift. No muscle wasting or fasiculations noted. Reflexes:  DTRs 2+ throughout. Toes downgoing. LABS / IMAGING  CBC, CMP, B12, TSH was recently ordered and is pending    ASSESSMENT    ICD-10-CM ICD-9-CM    1. Mixed dementia ([facility]  G30.9 331.0 donepeziL (ARICEPT) 5 mg tablet    F01.50 294.10 CT HEAD WO CONT    F02.80 290.40        DISCUSSION  Ms. Deidra Aguayo has at least moderate dementia on her cognitive screening.   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 I have recommended CT brain to assess for brain volume, white matter disease, hydrocephalus etc.  Try donepezil 5 mg daily and increase to 10 mg daily after 1 month  She was encouraged to stay physically, mentally and socially active  She currently does not drive and has a very supportive   We discussed that her needs are likely going to increase over time  Continue periodic follow-up    Liat Hernandez MD  Diplomate, American Board of Psychiatry & Neurology (Neurology)  Diplomate, [address] [place] [address] Rd., [address] of Psychiatry & Neurology (Clinical Neurophysiology)  Diplomate, American Board of Electrodiagnostic Medicine  This note will not be viewable in [address]

## 2020-08-13 NOTE — PATIENT INSTRUCTIONS
PRESCRIPTION REFILL POLICY [facility] Statement to Patients April 1, 2014 In an effort to ensure the large volume of patient prescription refills is processed in the most efficient and expeditious manner, we are asking our patients to assist us by calling your Pharmacy for all prescription refills, this will include also your  Mail Order Pharmacy. The pharmacy will contact our office electronically to continue the refill process. Please do not wait until the last minute to call your pharmacy. We need at least 48 hours (2days) to fill prescriptions. We also encourage you to call your pharmacy before going to  your prescription to make sure it is ready. With regard to controlled substance prescription refill requests (narcotic refills) that need to be picked up at our office, we ask your cooperation by providing us with at least 72 hours (3days) notice that you will need a refill. We will not refill narcotic prescription refill requests after 4:00pm on any weekday, Monday through Thursday, or after 2:00pm on Fridays, or on the weekends. We encourage everyone to explore another way of getting your prescription refill request processed using [employer], our patient web portal through our electronic medical record system. [employer] is an efficient and effective way to communicate your medication request directly to the office and  downloadable as an kevin on your smart phone . [employer] also features a review functionality that allows you to view your medication list as well as leave messages for your physician. Are you ready to get connected? If so please review the attatched instructions or speak to any of our staff to get you set up right away! Thank you so much for your cooperation. Should you have any questions please contact our Practice Administrator. The Physicians and Staff,  [facility]

## 2020-08-13 NOTE — PROGRESS NOTES
Ms. Tayler Garcia presents as a new patient for evaluation of memory loss. Patient's spouse reported patient has had short-term memory loss for the past year. Depression screening done on patient.

## 2020-08-17 ENCOUNTER — APPOINTMENT (OUTPATIENT)
Dept: FAMILY MEDICINE CLINIC | Age: 85
End: 2020-08-17

## 2020-08-18 LAB
25(OH)D3+25(OH)D2 SERPL-MCNC: 37.5 NG/ML (ref 30–100)
ALBUMIN SERPL-MCNC: 3.8 G/DL (ref 3.6–4.6)
ALBUMIN/GLOB SERPL: 1.5 {RATIO} (ref 1.2–2.2)
ALP SERPL-CCNC: 79 IU/L (ref 39–117)
ALT SERPL-CCNC: 11 IU/L (ref 0–32)
AST SERPL-CCNC: 16 IU/L (ref 0–40)
BASOPHILS # BLD AUTO: 0 X10E3/UL (ref 0–0.2)
BASOPHILS NFR BLD AUTO: 0 %
BILIRUB SERPL-MCNC: 0.8 MG/DL (ref 0–1.2)
BUN SERPL-MCNC: 16 MG/DL (ref 8–27)
BUN/CREAT SERPL: 18 (ref 12–28)
CALCIUM SERPL-MCNC: 9.3 MG/DL (ref 8.7–10.3)
CHLORIDE SERPL-SCNC: 98 MMOL/L (ref 96–106)
CHOLEST SERPL-MCNC: 326 MG/DL (ref 100–199)
CO2 SERPL-SCNC: 23 MMOL/L (ref 20–29)
COMMENT, 011824: ABNORMAL
CREAT SERPL-MCNC: 0.9 MG/DL (ref 0.57–1)
EOSINOPHIL # BLD AUTO: 0.1 X10E3/UL (ref 0–0.4)
EOSINOPHIL NFR BLD AUTO: 3 %
ERYTHROCYTE [DISTWIDTH] IN BLOOD BY AUTOMATED COUNT: 13.9 % (ref 11.7–15.4)
EST. AVERAGE GLUCOSE BLD GHB EST-MCNC: 120 MG/DL
FOLATE SERPL-MCNC: 6.2 NG/ML
GLOBULIN SER CALC-MCNC: 2.6 G/DL (ref 1.5–4.5)
GLUCOSE SERPL-MCNC: 109 MG/DL (ref 65–99)
HBA1C MFR BLD: 5.8 % (ref 4.8–5.6)
HCT VFR BLD AUTO: 44 % (ref 34–46.6)
HDLC SERPL-MCNC: 70 MG/DL
HGB BLD-MCNC: 14.4 G/DL (ref 11.1–15.9)
IMM GRANULOCYTES # BLD AUTO: 0 X10E3/UL (ref 0–0.1)
IMM GRANULOCYTES NFR BLD AUTO: 0 %
INTERPRETATION, 910389: NORMAL
INTERPRETATION: NORMAL
LDLC SERPL CALC-MCNC: 236 MG/DL (ref 0–99)
LYMPHOCYTES # BLD AUTO: 1.1 X10E3/UL (ref 0.7–3.1)
LYMPHOCYTES NFR BLD AUTO: 22 %
MCH RBC QN AUTO: 30.9 PG (ref 26.6–33)
MCHC RBC AUTO-ENTMCNC: 32.7 G/DL (ref 31.5–35.7)
MCV RBC AUTO: 94 FL (ref 79–97)
MONOCYTES # BLD AUTO: 0.3 X10E3/UL (ref 0.1–0.9)
MONOCYTES NFR BLD AUTO: 7 %
NEUTROPHILS # BLD AUTO: 3.4 X10E3/UL (ref 1.4–7)
NEUTROPHILS NFR BLD AUTO: 68 %
PDF IMAGE, 910387: NORMAL
PLATELET # BLD AUTO: 216 X10E3/UL (ref 150–450)
POTASSIUM SERPL-SCNC: 4 MMOL/L (ref 3.5–5.2)
PROT SERPL-MCNC: 6.4 G/DL (ref 6–8.5)
RBC # BLD AUTO: 4.66 X10E6/UL (ref 3.77–5.28)
SODIUM SERPL-SCNC: 136 MMOL/L (ref 134–144)
T4 FREE SERPL-MCNC: 1.42 NG/DL (ref 0.82–1.77)
TRIGL SERPL-MCNC: 101 MG/DL (ref 0–149)
TSH SERPL DL<=0.005 MIU/L-ACNC: 2.37 UIU/ML (ref 0.45–4.5)
VIT B12 SERPL-MCNC: 438 PG/ML (ref 232–1245)
VLDLC SERPL CALC-MCNC: 20 MG/DL (ref 5–40)
WBC # BLD AUTO: 4.9 X10E3/UL (ref 3.4–10.8)

## 2020-08-18 NOTE — PROGRESS NOTES
Please send a LETTER with the following:    Hemoglobin A1C (average blood sugar level for past 3 months) is in the pre diabetes range, which means your average sugar or glucose level is higher than normal.  Cholesterol is high but as discussed, you can stop the statin medication given your age. I would encourage healthy food choices and regular exercise. Otherwise your labs are normal. I am glad to hear that you have already seen the neurologist and I agree with his plan. Please let me know if you have any concerns.

## 2020-08-20 ENCOUNTER — HOSPITAL ENCOUNTER (OUTPATIENT)
Dept: CT IMAGING | Age: 85
Discharge: HOME | End: 2020-08-20
Attending: PSYCHIATRY & NEUROLOGY
Payer: MEDICARE

## 2020-08-20 DIAGNOSIS — F02.80 MIXED DEMENTIA (HCC): ICD-10-CM

## 2020-08-20 DIAGNOSIS — F01.50 MIXED DEMENTIA (HCC): ICD-10-CM

## 2020-08-20 DIAGNOSIS — G30.9 MIXED DEMENTIA (HCC): ICD-10-CM

## 2020-08-20 PROCEDURE — 70450 CT HEAD/BRAIN W/O DYE: CPT

## 2020-08-27 DIAGNOSIS — F01.50 MIXED DEMENTIA (HCC): ICD-10-CM

## 2020-08-27 DIAGNOSIS — G30.9 MIXED DEMENTIA (HCC): ICD-10-CM

## 2020-08-27 DIAGNOSIS — F02.80 MIXED DEMENTIA (HCC): ICD-10-CM

## 2020-08-27 NOTE — TELEPHONE ENCOUNTER
Pt's  calling in regards to pt and medication. Stated since started the medication pt wakes up a bundle of nerves. Wasn't like this prior. Please call.

## 2020-08-27 NOTE — TELEPHONE ENCOUNTER
Per Dr. Arnoldo Jamil, It is likely related to donepezil.  Please advise to reduce the dose to 5 mg daily in place of 10 mg.

## 2020-08-28 RX ORDER — DONEPEZIL HYDROCHLORIDE 5 MG/1
5 TABLET, FILM COATED ORAL
Qty: 30 TAB | Refills: 0 | Status: SHIPPED | OUTPATIENT
Start: 2020-08-28 | End: 2020-08-28

## 2020-09-01 NOTE — PROGRESS NOTES
Please send a LETTER with the following: Your bone density test shows osteopenia, which is the \"pre-osteoporosis\" stage. For osteopenia, it is recommended to do weight-bearing exercises, avoid smoking, and try to get enough daily vitamin D and calcium. People usually need vitamin D supplements whereas most people get enough daily calcium through a well balanced diet. We can repeat this test in 2 to 3 years. You can stop the Fosamax at this time since you have been on it for several years.

## 2020-10-22 ENCOUNTER — TELEPHONE (OUTPATIENT)
Dept: FAMILY MEDICINE CLINIC | Age: 85
End: 2020-10-22

## 2020-10-22 NOTE — TELEPHONE ENCOUNTER
Spouse Lily Plan) states tht pt was seen by neurologist pt started on donepezil 10mg, contacted neurologist  due to side effects (loss of appetite), decreased meds  to 5mg pt felt better after the med decrease, however she was not 100% until the med was discontinued. She is now experiencing pain in her shoulder. Nataly Thorne also stated the pt has a loss of memory issue. Homa Ricardo would like a call back.       [facility]#[phone]

## 2020-10-28 NOTE — TELEPHONE ENCOUNTER
Outbound call to patients . Unable to leave message as phone continually rings. Patient has appointment for 10/29/20 will address at appointment.

## 2020-10-29 ENCOUNTER — OFFICE VISIT (OUTPATIENT)
Dept: FAMILY MEDICINE CLINIC | Age: 85
End: 2020-10-29
Payer: MEDICARE

## 2020-10-29 VITALS
HEIGHT: 64 IN | RESPIRATION RATE: 20 BRPM | TEMPERATURE: 97.9 F | SYSTOLIC BLOOD PRESSURE: 119 MMHG | BODY MASS INDEX: 27.83 KG/M2 | OXYGEN SATURATION: 95 % | WEIGHT: 163 LBS | DIASTOLIC BLOOD PRESSURE: 71 MMHG | HEART RATE: 70 BPM

## 2020-10-29 DIAGNOSIS — Z23 NEEDS FLU SHOT: ICD-10-CM

## 2020-10-29 DIAGNOSIS — Z00.00 ENCOUNTER FOR MEDICARE ANNUAL WELLNESS EXAM: Primary | ICD-10-CM

## 2020-10-29 DIAGNOSIS — F03.B0 MODERATE DEMENTIA WITHOUT BEHAVIORAL DISTURBANCE: ICD-10-CM

## 2020-10-29 DIAGNOSIS — R53.81 PHYSICAL DECONDITIONING: ICD-10-CM

## 2020-10-29 DIAGNOSIS — Z91.81 AT RISK FOR FALLS: ICD-10-CM

## 2020-10-29 PROCEDURE — G8427 DOCREV CUR MEDS BY ELIG CLIN: HCPCS | Performed by: FAMILY MEDICINE

## 2020-10-29 PROCEDURE — 99214 OFFICE O/P EST MOD 30 MIN: CPT | Performed by: FAMILY MEDICINE

## 2020-10-29 PROCEDURE — G8419 CALC BMI OUT NRM PARAM NOF/U: HCPCS | Performed by: FAMILY MEDICINE

## 2020-10-29 PROCEDURE — 90653 IIV ADJUVANT VACCINE IM: CPT

## 2020-10-29 PROCEDURE — 1090F PRES/ABSN URINE INCON ASSESS: CPT

## 2020-10-29 PROCEDURE — G0439 PPPS, SUBSEQ VISIT: HCPCS | Performed by: FAMILY MEDICINE

## 2020-10-29 PROCEDURE — G8536 NO DOC ELDER MAL SCRN: HCPCS | Performed by: FAMILY MEDICINE

## 2020-10-29 PROCEDURE — 1101F PT FALLS ASSESS-DOCD LE1/YR: CPT | Performed by: FAMILY MEDICINE

## 2020-10-29 PROCEDURE — G0008 ADMIN INFLUENZA VIRUS VAC: HCPCS | Performed by: FAMILY MEDICINE

## 2020-10-29 PROCEDURE — G8510 SCR DEP NEG, NO PLAN REQD: HCPCS | Performed by: FAMILY MEDICINE

## 2020-10-29 NOTE — PATIENT INSTRUCTIONS
Vaccine Information Statement Influenza (Flu) Vaccine (Inactivated or Recombinant): What You Need to Know Many Vaccine Information Statements are available in [place] and other languages. See www.immunize.org/vis Hojas de información sobre vacunas están disponibles en español y en muchos otros idiomas. Visite www.immunize.org/vis 1. Why get vaccinated? Influenza vaccine can prevent influenza (flu). Flu is a contagious disease that spreads around the [facility] every year, usually between October and May. Anyone can get the flu, but it is more dangerous for some people. Infants and young children, people 72years of age and older, pregnant women, and people with certain health conditions or a weakened immune system are at greatest risk of flu complications. Pneumonia, bronchitis, sinus infections and ear infections are examples of flu-related complications. If you have a medical condition, such as heart disease, cancer or diabetes, flu can make it worse. Flu can cause fever and chills, sore throat, muscle aches, fatigue, cough, headache, and runny or stuffy nose. Some people may have vomiting and diarrhea, though this is more common in children than adults. Each year thousands of people in the [facility] die from flu, and many more are hospitalized. Flu vaccine prevents millions of illnesses and flu-related visits to the doctor each year. 2. Influenza vaccines CDC recommends everyone 10months of age and older get vaccinated every flu season. Children 6 months through 6years of age may need 2 doses during a single flu season. Everyone else needs only 1 dose each flu season. It takes about 2 weeks for protection to develop after vaccination. There are many flu viruses, and they are always changing. Each year a new flu vaccine is made to protect against three or four viruses that are likely to cause disease in the upcoming flu season.  Even when the vaccine doesnt exactly match these viruses, it may still provide some protection. Influenza vaccine does not cause flu. Influenza vaccine may be given at the same time as other vaccines. 3. Talk with your health care provider Tell your vaccine provider if the person getting the vaccine: 
 Has had an allergic reaction after a previous dose of influenza vaccine, or has any severe, life-threatening allergies.  Has ever had Guillain-Barré Syndrome (also called GBS). In some cases, your health care provider may decide to postpone influenza vaccination to a future visit. People with minor illnesses, such as a cold, may be vaccinated. People who are moderately or severely ill should usually wait until they recover before getting influenza vaccine. Your health care provider can give you more information. 4. Risks of a reaction  Soreness, redness, and swelling where shot is given, fever, muscle aches, and headache can happen after influenza vaccine.  There may be a very small increased risk of Guillain-Barré Syndrome (GBS) after inactivated influenza vaccine (the flu shot). 8 [facility] children who get the flu shot along with pneumococcal vaccine (PCV13), and/or DTaP vaccine at the same time might be slightly more likely to have a seizure caused by fever. Tell your health care provider if a child who is getting flu vaccine has ever had a seizure. People sometimes faint after medical procedures, including vaccination. Tell your provider if you feel dizzy or have vision changes or ringing in the ears. As with any medicine, there is a very remote chance of a vaccine causing a severe allergic reaction, other serious injury, or death. 5. What if there is a serious problem? An allergic reaction could occur after the vaccinated person leaves the clinic.  If you see signs of a severe allergic reaction (hives, swelling of the face and throat, difficulty breathing, a fast heartbeat, dizziness, or weakness), call 9-1-1 and get the person to the nearest hospital. 
 
For other signs that concern you, call your health care provider. Adverse reactions should be reported to the Vaccine Adverse Event Reporting System (VAERS). Your health care provider will usually file this report, or you can do it yourself. Visit the VAERS website at www.vaers. hhs.gov or call 0-949.993.8910. VAERS is only for reporting reactions, and VAERS staff do not give medical advice. 6. The National Vaccine Injury Compensation Program 
 
The [facility] Vaccine Injury Compensation Program (VICP) is a federal program that was created to compensate people who may have been injured by certain vaccines. Visit the VICP website at www.hrsa.gov/vaccinecompensation or call 0-111.582.8910 to learn about the program and about filing a claim. There is a time limit to file a claim for compensation. 7. How can I learn more?  Ask your health care provider.  Call your local or state health department.  Contact the Centers for Disease Control and Prevention (CDC): 
- Call 2-856.100.8127 (1-800-CDC-INFO) or 
- Visit CDCs influenza website at www.cdc.gov/flu Vaccine Information Statement (Interim) Inactivated Influenza Vaccine 8/15/2019 
[address] 383PP-80 Department of Health and [employer] Centers for Disease Control and Prevention Office Use Only

## 2020-10-29 NOTE — PROGRESS NOTES
Chief Complaint   Patient presents with    Fatigue     has been going on for 1 month of more,    Annual Wellness Visit     due for [address]    Decreased Appetite    Shaking    Insomnia       1. Have you been to the ER, urgent care clinic since your last visit? Hospitalized since your last visit? No    2. Have you seen or consulted any other health care providers outside of the [address] [place] [address] [place] since your last visit? Include any pap smears or colon screening. No    3 most recent PHQ Screens 10/29/2020   Little interest or pleasure in doing things Not at all   Feeling down, depressed, irritable, or hopeless Not at all   Total Score PHQ 2 0       Fall Risk Assessment, last 12 mths 10/29/2020   Able to walk? Yes   Fall in past 12 months? No           ADL Assessment 10/29/2020   Feeding yourself No Help Needed   Getting from bed to chair No Help Needed   Getting dressed No Help Needed   Bathing or showering No Help Needed   Walk across the room (includes cane/walker) No Help Needed   Using the telphone No Help Needed   Taking your medications Help Needed   Preparing meals Help Needed   Managing money (expenses/bills) Help Needed   Moderately strenuous housework (laundry) Help Needed   Shopping for personal items (toiletries/medicines) Help Needed   Shopping for groceries Help Needed   Driving Help Needed   Climbing a flight of stairs No Help Needed   Getting to places beyond walking distances Help Needed     Abuse Screening Questionnaire 10/29/2020   Do you ever feel afraid of your partner? N   Are you in a relationship with someone who physically or mentally threatens you? N   Is it safe for you to go home? Y     Updated viis was given to patient/guardian prior to administering vaccine. Opportunity was presented for questions. No concerns were verbalized prior to administration. Vaccine administered by Lance Flores LPN. No adverse reactions noted.

## 2020-10-29 NOTE — PROGRESS NOTES
This is the Subsequent Medicare Annual Wellness Exam, performed 12 months or more after the Initial AWV or the last Subsequent AWV    I have reviewed the patient's medical history in detail and updated the computerized patient record. History     Patient Active Problem List   Diagnosis Code    Arthritis of knee, right M17.11    Insomnia G47.00    Overactive bladder N32.81    AR (allergic rhinitis) J30.9    Osteoarthritis of multiple joints M15.9    GERD (gastroesophageal reflux disease) K21.9    Hypercholesterolemia E78.00    Pre-diabetes R73.03    Osteopenia M85.80    Unspecified vitamin D deficiency E55.9    Urinary incontinence R32    Advance care planning Z71.89     Past Medical History:   Diagnosis Date    AR (allergic rhinitis)     DJD (degenerative joint disease)     GERD (gastroesophageal reflux disease)     Hypercholesterolemia     Insomnia 11/13/2013    Osteopenia 11/2010    repeat DEXA 2017; on Fosamax 35 mg weekly    Pre-diabetes     Unspecified vitamin D deficiency 2009    Urinary incontinence     mild      Past Surgical History:   Procedure Laterality Date    HX ORTHOPAEDIC  10/99     R hip replacement >infected redone 4/00>10/00    HX ORTHOPAEDIC      LT KNEE ARTHROSCOPY    HX ORTHOPAEDIC  7/15/13    R knee replacement    HX TONSIL AND ADENOIDECTOMY      age 9     Current Outpatient Medications   Medication Sig Dispense Refill    cholecalciferol (Vitamin D3) (2,000 UNITS /50 MCG) cap capsule TAKE 1 TABLET BY MOUTH EVERY DAY 90 Cap 3    alendronate (FOSAMAX) 70 mg tablet TAKE 1 TABLET BY MOUTH EVERY 7 DAYS 12 Tab 3    calcium-vitamin D (CALCIUM+D) 500 mg(1,250mg) -200 unit per tablet Take 1 Tab by mouth two (2) times daily (with meals).        Allergies   Allergen Reactions    Morphine Nausea and Vomiting    Codeine Nausea and Vomiting     GI upset    Gentamicin Other (comments)    Hydrocodone Nausea and Vomiting    Iodine Rash     fever    Pcn [Penicillins] Other (comments)     childhood    Tetracycline Other (comments)     Change of mental status    Tramadol Nausea and Vomiting    Fish Oil [Omega 3-Dha-Epa-Fish Oil] Other (comments)     Belching consistently        Family History   Problem Relation Age of Onset    Psychiatric Disorder Daughter         drug overdose    Cancer Sister         lung     Social History     Tobacco Use    Smoking status: Never Smoker    Smokeless tobacco: Never Used   Substance Use Topics    Alcohol use: Yes     Comment: 1-2 glasses of wine per night       Depression Risk Factor Screening:     3 most recent PHQ Screens 10/29/2020   Little interest or pleasure in doing things Not at all   Feeling down, depressed, irritable, or hopeless Not at all   Total Score PHQ 2 0       Alcohol Risk Screen   Do you average more than 1 drink per night or more than 7 drinks a week:  No    On any one occasion in the past three months have you have had more than 3 drinks containing alcohol:  No        Functional Ability and Level of Safety:   Hearing: Hearing is good. Activities of Daily Living: The home contains: no safety equipment. See ADL questionnaire. Ambulation: with mild difficulty     Fall Risk:  Fall Risk Assessment, last 12 mths 10/29/2020   Able to walk? Yes   Fall in past 12 months? No     Abuse Screen:  Patient is not abused       Cognitive Screening   Has your family/caregiver stated any concerns about your memory: yes - see note       Patient Care Team   Patient Care Team:  Gautam Matta MD as PCP - General (Family Medicine)  Gautam Matta MD as PCP - REHABILITATION [facility] Empaneled Provider    Assessment/Plan   Education and counseling provided:  Are appropriate based on today's review and evaluation    Diagnoses and all orders for this visit:    1. Encounter for Medicare annual wellness exam    2. Moderate dementia without behavioral disturbance ([address]  -     REFERRAL TO HOME HEALTH    3.  Physical deconditioning  -     REFERRAL TO HOME HEALTH    4. At risk for falls  -     [address]    5.  Needs flu shot  -     INFLUENZA VACCINE INACTIVATED (IIV), SUBUNIT, ADJUVANTED, IM        Health Maintenance Due   Topic Date Due    GLAUCOMA SCREENING Q2Y  07/08/2016    Shingrix Vaccine Age 50> (2 of 2) 06/24/2019    Medicare Yearly Exam  04/25/2020    Flu Vaccine (1) 09/01/2020

## 2020-10-29 NOTE — PROGRESS NOTES
Patient Name: Mitch Vidal   MRN: [id number]    Carol Taylor is a 80 y.o. female who presents with the following: here with . Pt was diagnosed with moderate dementia per neurology. She tried Donepezil at both 10 mg and 5 mg but this caused her to be emotionally labile and upset, which was unusual for her. She does not want to take any medications for her memory and her  agrees. He states that she often feels tired and cold throughout the day. Her appetite is less. Denies any abdominal pain or changes in her bowel movements. She is physically inactive and he feels like her strength is declining. He is concerned about her balance. Prior work up was notable for normal labs and CT head. They have enough help at home but may need to consider home aide or nursing home in the future. Wt Readings from Last 3 Encounters:   10/29/20 163 lb (73.9 kg)   08/13/20 168 lb (76.2 kg)   04/25/19 168 lb 6.4 oz (76.4 kg)       Review of Systems   Constitutional: Positive for malaise/fatigue. Negative for fever and weight loss. Respiratory: Negative for cough, hemoptysis, shortness of breath and wheezing. Cardiovascular: Negative for chest pain, palpitations, leg swelling and PND. Gastrointestinal: Negative for abdominal pain, constipation, diarrhea, nausea and vomiting. Psychiatric/Behavioral: Positive for memory loss. Negative for hallucinations and suicidal ideas. The patient is not nervous/anxious and does not have insomnia. The patient's medications, allergies, past medical history, surgical history, family history and social history were reviewed and updated where appropriate. Prior to Admission medications    Medication Sig Start Date End Date Taking?  Authorizing Provider   cholecalciferol (Vitamin D3) (2,000 UNITS /50 MCG) cap capsule TAKE 1 TABLET BY MOUTH EVERY DAY 7/28/20  Yes Martin Soto MD   alendronate (FOSAMAX) 70 mg tablet TAKE 1 TABLET BY MOUTH EVERY 7 DAYS 5/7/20  Yes Rocio Wilson NP   calcium-vitamin D (CALCIUM+D) 500 mg(1,250mg) -200 unit per tablet Take 1 Tab by mouth two (2) times daily (with meals). Yes Provider, Historical   donepeziL (ARICEPT) 5 mg tablet TAKE 1 TABLET BY MOUTH NIGHTLY 8/28/20   Moisés Santoyo MD   rosuvastatin (CRESTOR) 20 mg tablet TAKE 1 TABLET BY MOUTH EVERY NIGHT 6/27/19   Jannet Delarosa MD       Allergies   Allergen Reactions    Morphine Nausea and Vomiting    Codeine Nausea and Vomiting     GI upset    Gentamicin Other (comments)    Hydrocodone Nausea and Vomiting    Iodine Rash     fever    Pcn [Penicillins] Other (comments)     childhood    Tetracycline Other (comments)     Change of mental status    Tramadol Nausea and Vomiting    Fish Oil [Omega 3-Dha-Epa-Fish Oil] Other (comments)     Belching consistently          OBJECTIVE    Visit Vitals  /71   Pulse 70   Temp 97.9 °F (36.6 °C) (Temporal)   Resp 20   Ht 5' 4\" (1.626 m)   Wt 163 lb (73.9 kg)   SpO2 95%   BMI 27.98 kg/m²       Physical Exam  Vitals signs and nursing note reviewed. Constitutional:       General: She is not in acute distress. Appearance: She is not diaphoretic. Eyes:      Conjunctiva/sclera: Conjunctivae normal.      Pupils: Pupils are equal, round, and reactive to light. Cardiovascular:      Rate and Rhythm: Normal rate and regular rhythm. Heart sounds: Normal heart sounds. No murmur. No friction rub. No gallop. Pulmonary:      Effort: Pulmonary effort is normal. No respiratory distress. Breath sounds: Normal breath sounds. No wheezing. Skin:     General: Skin is warm and dry. Neurological:      Mental Status: She is alert. Psychiatric:         Mood and Affect: Mood normal.         Behavior: Behavior normal.         Thought Content: Thought content normal.         Judgment: Judgment normal.           ASSESSMENT AND PLAN  Lazarus Banks is a 80 y.o. female who presents today for:    1.  Encounter for Medicare annual wellness exam    2. Moderate dementia without behavioral disturbance ([facility] [address]  Will hold off on medications for now. A lot of her symptoms are likely due to progressing dementia. Will initiate HH PT/OT to help with safety and physical strength. - [address]    3. Physical deconditioning  - REFERRAL TO HOME HEALTH    4. At risk for falls  - [address]    5. Needs flu shot  - INFLUENZA VACCINE INACTIVATED (IIV), SUBUNIT, ADJUVANTED, IM       Medications Discontinued During This Encounter   Medication Reason    rosuvastatin (CRESTOR) 20 mg tablet     donepeziL (ARICEPT) 5 mg tablet      Follow-up and Dispositions    · Return in about 3 months (around 1/29/2021) for Medication Check. Treatment risks/benefits/costs/interactions/alternatives discussed with patient. Advised patient to call back or return to office if symptoms worsen/change/persist. If patient cannot reach us or should anything more severe/urgent arise he/she should proceed directly to the nearest emergency department. Discussed expected course/resolution/complications of diagnosis in detail with patient. Patient expressed understanding with the diagnosis and plan. Omaira Vigil M.D.

## 2020-10-30 ENCOUNTER — HOME HEALTH ADMISSION (OUTPATIENT)
Dept: HOME HEALTH SERVICES | Facility: HOME HEALTH | Age: 85
End: 2020-10-30
Payer: MEDICARE

## 2020-11-02 ENCOUNTER — HOME CARE VISIT (OUTPATIENT)
Dept: SCHEDULING | Facility: HOME HEALTH | Age: 85
End: 2020-11-02
Payer: MEDICARE

## 2020-11-02 VITALS
HEART RATE: 78 BPM | DIASTOLIC BLOOD PRESSURE: 78 MMHG | BODY MASS INDEX: 26.31 KG/M2 | RESPIRATION RATE: 17 BRPM | SYSTOLIC BLOOD PRESSURE: 128 MMHG | TEMPERATURE: 97.5 F | HEIGHT: 66 IN | OXYGEN SATURATION: 98 %

## 2020-11-02 PROCEDURE — 3331090001 HH PPS REVENUE CREDIT

## 2020-11-02 PROCEDURE — 400013 HH SOC

## 2020-11-02 PROCEDURE — 3331090002 HH PPS REVENUE DEBIT

## 2020-11-02 PROCEDURE — G0151 HHCP-SERV OF PT,EA 15 MIN: HCPCS

## 2020-11-03 PROCEDURE — 3331090001 HH PPS REVENUE CREDIT

## 2020-11-03 PROCEDURE — 3331090002 HH PPS REVENUE DEBIT

## 2020-11-04 ENCOUNTER — HOME CARE VISIT (OUTPATIENT)
Dept: SCHEDULING | Facility: HOME HEALTH | Age: 85
End: 2020-11-04
Payer: MEDICARE

## 2020-11-04 PROCEDURE — 3331090001 HH PPS REVENUE CREDIT

## 2020-11-04 PROCEDURE — 3331090002 HH PPS REVENUE DEBIT

## 2020-11-04 PROCEDURE — G0157 HHC PT ASSISTANT EA 15: HCPCS

## 2020-11-05 ENCOUNTER — HOME CARE VISIT (OUTPATIENT)
Dept: SCHEDULING | Facility: HOME HEALTH | Age: 85
End: 2020-11-05
Payer: MEDICARE

## 2020-11-05 VITALS
HEART RATE: 70 BPM | TEMPERATURE: 98.1 F | OXYGEN SATURATION: 98 % | DIASTOLIC BLOOD PRESSURE: 72 MMHG | RESPIRATION RATE: 18 BRPM | SYSTOLIC BLOOD PRESSURE: 138 MMHG

## 2020-11-05 PROCEDURE — 3331090001 HH PPS REVENUE CREDIT

## 2020-11-05 PROCEDURE — G0152 HHCP-SERV OF OT,EA 15 MIN: HCPCS

## 2020-11-05 PROCEDURE — 3331090002 HH PPS REVENUE DEBIT

## 2020-11-06 PROCEDURE — 3331090001 HH PPS REVENUE CREDIT

## 2020-11-06 PROCEDURE — 3331090002 HH PPS REVENUE DEBIT

## 2020-11-07 PROCEDURE — 3331090001 HH PPS REVENUE CREDIT

## 2020-11-07 PROCEDURE — 3331090002 HH PPS REVENUE DEBIT

## 2020-11-08 PROCEDURE — 3331090002 HH PPS REVENUE DEBIT

## 2020-11-08 PROCEDURE — 3331090001 HH PPS REVENUE CREDIT

## 2020-11-09 ENCOUNTER — HOME CARE VISIT (OUTPATIENT)
Dept: SCHEDULING | Facility: HOME HEALTH | Age: 85
End: 2020-11-09
Payer: MEDICARE

## 2020-11-09 VITALS
DIASTOLIC BLOOD PRESSURE: 76 MMHG | OXYGEN SATURATION: 98 % | TEMPERATURE: 98.5 F | SYSTOLIC BLOOD PRESSURE: 128 MMHG | HEART RATE: 74 BPM | RESPIRATION RATE: 16 BRPM

## 2020-11-09 PROCEDURE — 3331090001 HH PPS REVENUE CREDIT

## 2020-11-09 PROCEDURE — 3331090002 HH PPS REVENUE DEBIT

## 2020-11-09 PROCEDURE — G0157 HHC PT ASSISTANT EA 15: HCPCS

## 2020-11-10 PROCEDURE — 3331090001 HH PPS REVENUE CREDIT

## 2020-11-10 PROCEDURE — 3331090002 HH PPS REVENUE DEBIT

## 2020-11-11 ENCOUNTER — HOME CARE VISIT (OUTPATIENT)
Dept: SCHEDULING | Facility: HOME HEALTH | Age: 85
End: 2020-11-11
Payer: MEDICARE

## 2020-11-11 VITALS
SYSTOLIC BLOOD PRESSURE: 151 MMHG | HEART RATE: 74 BPM | DIASTOLIC BLOOD PRESSURE: 91 MMHG | OXYGEN SATURATION: 92 % | RESPIRATION RATE: 16 BRPM | TEMPERATURE: 98.1 F

## 2020-11-11 PROCEDURE — G0152 HHCP-SERV OF OT,EA 15 MIN: HCPCS

## 2020-11-11 PROCEDURE — G0157 HHC PT ASSISTANT EA 15: HCPCS

## 2020-11-11 PROCEDURE — 3331090001 HH PPS REVENUE CREDIT

## 2020-11-11 PROCEDURE — 3331090002 HH PPS REVENUE DEBIT

## 2020-11-12 ENCOUNTER — HOME CARE VISIT (OUTPATIENT)
Dept: SCHEDULING | Facility: HOME HEALTH | Age: 85
End: 2020-11-12
Payer: MEDICARE

## 2020-11-12 ENCOUNTER — HOME CARE VISIT (OUTPATIENT)
Dept: HOME HEALTH SERVICES | Facility: HOME HEALTH | Age: 85
End: 2020-11-12
Payer: MEDICARE

## 2020-11-12 VITALS
OXYGEN SATURATION: 98 % | RESPIRATION RATE: 18 BRPM | DIASTOLIC BLOOD PRESSURE: 60 MMHG | TEMPERATURE: 98.7 F | SYSTOLIC BLOOD PRESSURE: 134 MMHG | HEART RATE: 70 BPM

## 2020-11-12 PROCEDURE — 3331090001 HH PPS REVENUE CREDIT

## 2020-11-12 PROCEDURE — 3331090002 HH PPS REVENUE DEBIT

## 2020-11-13 PROCEDURE — 3331090001 HH PPS REVENUE CREDIT

## 2020-11-13 PROCEDURE — 3331090002 HH PPS REVENUE DEBIT

## 2020-11-14 PROCEDURE — 3331090001 HH PPS REVENUE CREDIT

## 2020-11-14 PROCEDURE — 3331090002 HH PPS REVENUE DEBIT

## 2020-11-15 PROCEDURE — 3331090001 HH PPS REVENUE CREDIT

## 2020-11-15 PROCEDURE — 3331090002 HH PPS REVENUE DEBIT

## 2020-11-16 ENCOUNTER — HOME CARE VISIT (OUTPATIENT)
Dept: SCHEDULING | Facility: HOME HEALTH | Age: 85
End: 2020-11-16
Payer: MEDICARE

## 2020-11-16 VITALS
SYSTOLIC BLOOD PRESSURE: 136 MMHG | DIASTOLIC BLOOD PRESSURE: 82 MMHG | RESPIRATION RATE: 16 BRPM | OXYGEN SATURATION: 98 % | HEART RATE: 75 BPM | TEMPERATURE: 98.3 F

## 2020-11-16 PROCEDURE — G0157 HHC PT ASSISTANT EA 15: HCPCS

## 2020-11-16 PROCEDURE — 3331090001 HH PPS REVENUE CREDIT

## 2020-11-16 PROCEDURE — 3331090002 HH PPS REVENUE DEBIT

## 2020-11-17 PROCEDURE — 3331090001 HH PPS REVENUE CREDIT

## 2020-11-17 PROCEDURE — 3331090002 HH PPS REVENUE DEBIT

## 2020-11-18 VITALS
RESPIRATION RATE: 18 BRPM | TEMPERATURE: 98.1 F | OXYGEN SATURATION: 95 % | HEART RATE: 72 BPM | SYSTOLIC BLOOD PRESSURE: 134 MMHG | DIASTOLIC BLOOD PRESSURE: 72 MMHG

## 2020-11-18 PROCEDURE — 3331090002 HH PPS REVENUE DEBIT

## 2020-11-18 PROCEDURE — 3331090001 HH PPS REVENUE CREDIT

## 2020-11-19 ENCOUNTER — HOME CARE VISIT (OUTPATIENT)
Dept: SCHEDULING | Facility: HOME HEALTH | Age: 85
End: 2020-11-19
Payer: MEDICARE

## 2020-11-19 PROCEDURE — 3331090002 HH PPS REVENUE DEBIT

## 2020-11-19 PROCEDURE — 3331090001 HH PPS REVENUE CREDIT

## 2020-11-19 PROCEDURE — G0157 HHC PT ASSISTANT EA 15: HCPCS

## 2020-11-20 ENCOUNTER — HOME CARE VISIT (OUTPATIENT)
Dept: HOME HEALTH SERVICES | Facility: HOME HEALTH | Age: 85
End: 2020-11-20
Payer: MEDICARE

## 2020-11-20 ENCOUNTER — HOME CARE VISIT (OUTPATIENT)
Dept: SCHEDULING | Facility: HOME HEALTH | Age: 85
End: 2020-11-20
Payer: MEDICARE

## 2020-11-20 PROCEDURE — 3331090001 HH PPS REVENUE CREDIT

## 2020-11-20 PROCEDURE — 3331090002 HH PPS REVENUE DEBIT

## 2020-11-21 PROCEDURE — 3331090002 HH PPS REVENUE DEBIT

## 2020-11-21 PROCEDURE — 3331090001 HH PPS REVENUE CREDIT

## 2020-11-21 NOTE — PROGRESS NOTES
paitents spouse refusing further OT services related to desire to modify bath adn make changes with dme and ae at their own pace, refusing to participate in recommendations currently, spouse is going through alot due to chemo treatment. discussed at length issues related to patient dementia progressing, discussed need to consult later that OT could be available for any reason but if needing [facility] services that we could have a doctor order  and easily readmit as needed. patients spouse agreeable.

## 2020-11-22 PROCEDURE — 3331090001 HH PPS REVENUE CREDIT

## 2020-11-22 PROCEDURE — 3331090002 HH PPS REVENUE DEBIT

## 2020-11-23 ENCOUNTER — HOME CARE VISIT (OUTPATIENT)
Dept: SCHEDULING | Facility: HOME HEALTH | Age: 85
End: 2020-11-23
Payer: MEDICARE

## 2020-11-23 VITALS
HEART RATE: 80 BPM | OXYGEN SATURATION: 97 % | TEMPERATURE: 98.6 F | DIASTOLIC BLOOD PRESSURE: 72 MMHG | SYSTOLIC BLOOD PRESSURE: 128 MMHG | RESPIRATION RATE: 16 BRPM

## 2020-11-23 PROCEDURE — 3331090001 HH PPS REVENUE CREDIT

## 2020-11-23 PROCEDURE — G0157 HHC PT ASSISTANT EA 15: HCPCS

## 2020-11-23 PROCEDURE — 3331090002 HH PPS REVENUE DEBIT

## 2020-11-24 PROCEDURE — 3331090002 HH PPS REVENUE DEBIT

## 2020-11-24 PROCEDURE — 3331090001 HH PPS REVENUE CREDIT

## 2020-11-25 VITALS
DIASTOLIC BLOOD PRESSURE: 81 MMHG | SYSTOLIC BLOOD PRESSURE: 151 MMHG | OXYGEN SATURATION: 97 % | TEMPERATURE: 98.1 F | RESPIRATION RATE: 16 BRPM | HEART RATE: 76 BPM

## 2020-11-25 PROCEDURE — 3331090002 HH PPS REVENUE DEBIT

## 2020-11-25 PROCEDURE — 3331090001 HH PPS REVENUE CREDIT

## 2020-11-26 PROCEDURE — 3331090001 HH PPS REVENUE CREDIT

## 2020-11-26 PROCEDURE — 3331090002 HH PPS REVENUE DEBIT

## 2020-11-27 ENCOUNTER — HOME CARE VISIT (OUTPATIENT)
Dept: SCHEDULING | Facility: HOME HEALTH | Age: 85
End: 2020-11-27
Payer: MEDICARE

## 2020-11-27 PROCEDURE — 3331090002 HH PPS REVENUE DEBIT

## 2020-11-27 PROCEDURE — G0151 HHCP-SERV OF PT,EA 15 MIN: HCPCS

## 2020-11-27 PROCEDURE — 3331090001 HH PPS REVENUE CREDIT

## 2020-11-28 PROCEDURE — 3331090002 HH PPS REVENUE DEBIT

## 2020-11-28 PROCEDURE — 3331090001 HH PPS REVENUE CREDIT

## 2020-11-29 VITALS
HEART RATE: 82 BPM | OXYGEN SATURATION: 97 % | DIASTOLIC BLOOD PRESSURE: 76 MMHG | TEMPERATURE: 98.1 F | SYSTOLIC BLOOD PRESSURE: 128 MMHG

## 2020-11-29 PROCEDURE — 3331090002 HH PPS REVENUE DEBIT

## 2020-11-29 PROCEDURE — 3331090001 HH PPS REVENUE CREDIT

## 2021-05-13 DIAGNOSIS — M81.0 OSTEOPOROSIS, UNSPECIFIED OSTEOPOROSIS TYPE, UNSPECIFIED PATHOLOGICAL FRACTURE PRESENCE: ICD-10-CM

## 2021-05-13 RX ORDER — ALENDRONATE SODIUM 70 MG/1
TABLET ORAL
Qty: 12 TAB | Refills: 3 | OUTPATIENT
Start: 2021-05-13

## 2021-05-13 NOTE — TELEPHONE ENCOUNTER
MD Javy Alfred,    Request for refill. Previous request was sent to Katie.    Thanks, Tiffany Andrews    Last Visit: 10/29/20  MD Javy Alfred  Next Appointment: 5/20/21 MD Reynaga  Previous Refill Encounter(s): 5/7/20 12 + 3    Requested Prescriptions     Pending Prescriptions Disp Refills    alendronate (FOSAMAX) 70 mg tablet 12 Tab 3     Sig: TAKE 1 TABLET BY MOUTH EVERY 7 DAYS

## 2021-05-20 ENCOUNTER — OFFICE VISIT (OUTPATIENT)
Dept: FAMILY MEDICINE CLINIC | Age: 86
End: 2021-05-20
Payer: MEDICARE

## 2021-05-20 VITALS
RESPIRATION RATE: 18 BRPM | HEIGHT: 66 IN | TEMPERATURE: 97.7 F | HEART RATE: 71 BPM | WEIGHT: 168 LBS | OXYGEN SATURATION: 96 % | DIASTOLIC BLOOD PRESSURE: 79 MMHG | SYSTOLIC BLOOD PRESSURE: 123 MMHG | BODY MASS INDEX: 27 KG/M2

## 2021-05-20 DIAGNOSIS — E78.5 HYPERLIPIDEMIA, UNSPECIFIED HYPERLIPIDEMIA TYPE: ICD-10-CM

## 2021-05-20 DIAGNOSIS — M81.0 OSTEOPOROSIS, UNSPECIFIED OSTEOPOROSIS TYPE, UNSPECIFIED PATHOLOGICAL FRACTURE PRESENCE: ICD-10-CM

## 2021-05-20 DIAGNOSIS — F03.B0 MODERATE DEMENTIA WITHOUT BEHAVIORAL DISTURBANCE: Primary | ICD-10-CM

## 2021-05-20 DIAGNOSIS — R73.03 PRE-DIABETES: ICD-10-CM

## 2021-05-20 DIAGNOSIS — R06.02 SOBOE (SHORTNESS OF BREATH ON EXERTION): ICD-10-CM

## 2021-05-20 PROCEDURE — G8536 NO DOC ELDER MAL SCRN: HCPCS | Performed by: FAMILY MEDICINE

## 2021-05-20 PROCEDURE — G8427 DOCREV CUR MEDS BY ELIG CLIN: HCPCS | Performed by: FAMILY MEDICINE

## 2021-05-20 PROCEDURE — 1101F PT FALLS ASSESS-DOCD LE1/YR: CPT | Performed by: FAMILY MEDICINE

## 2021-05-20 PROCEDURE — G8432 DEP SCR NOT DOC, RNG: HCPCS | Performed by: FAMILY MEDICINE

## 2021-05-20 PROCEDURE — 99214 OFFICE O/P EST MOD 30 MIN: CPT | Performed by: FAMILY MEDICINE

## 2021-05-20 PROCEDURE — G8419 CALC BMI OUT NRM PARAM NOF/U: HCPCS | Performed by: FAMILY MEDICINE

## 2021-05-20 PROCEDURE — 1090F PRES/ABSN URINE INCON ASSESS: CPT | Performed by: FAMILY MEDICINE

## 2021-05-20 RX ORDER — CLINDAMYCIN HYDROCHLORIDE 150 MG/1
CAPSULE ORAL
COMMUNITY
Start: 2021-05-17 | End: 2021-10-12

## 2021-05-20 NOTE — PATIENT INSTRUCTIONS
Osteoporosis: Care Instructions Overview Osteoporosis causes bones to become thin and weak. It is much more common in women than in men. Your chances of getting this disease depend on several things. These factors include the thickness of your bones (bone density), as well as health, diet, and physical activity. This disease may be very advanced before you know you have it. Sometimes the first sign is a broken bone in the hip, spine, or wrist. Or you may have sudden pain in your middle or lower back. Follow-up care is a key part of your treatment and safety. Be sure to make and go to all appointments, and call your doctor if you are having problems. It's also a good idea to know your test results and keep a list of the medicines you take. How can you care for yourself at home? · Your doctor may prescribe a bisphosphonate, such as risedronate (Actonel) or alendronate (Fosamax), for osteoporosis. If you are taking one of these medicines by mouth: 
? Take your medicine with a full glass of water when you first get up in the morning. ? Do not lie down, eat, drink a beverage, or take any other medicine for at least 30 minutes after taking the drug. This helps prevent stomach problems. ? Do not take your medicine late in the day if you forgot to take it in the morning. Skip it, and take the usual dose the next morning. ? If you have side effects, tell your doctor. Your doctor may prescribe another medicine. · Get enough calcium and vitamin D. The recommended daily allowances (RDAs) for adults younger than age 46 are 1,000 mg of calcium and 600 IU of vitamin D each day. Women ages 46 to 79 need 1,200 mg of calcium and 600 IU of vitamin D each day. Men ages 46 to 79 need 1,000 mg of calcium and 600 IU of vitamin D each day. Adults 71 and older need 1,200 mg of calcium and 800 IU of vitamin D each day. It's not clear if people who already have osteoporosis need more calcium and vitamin D than this.  Talk to your doctor about what's right for you. 
? Eat foods rich in calcium, like yogurt, cheese, milk, and dark green vegetables. This is a good way to get the calcium you need. You can get vitamin D from eggs, fatty fish, cereal, and milk. ? Ask your doctor if you need to take a calcium plus vitamin D supplement. You may be able to get enough calcium and vitamin D through your diet. Be careful with supplements. Adults ages 23 to 48 should not get more than 2,500 mg of calcium and 4,000 IU of vitamin D each day, whether it is from supplements and/or food. Adults ages 46 and older should not get more than 2,000 mg of calcium and 4,000 IU of vitamin D each day from supplements and/or food. · Limit alcohol to 2 drinks a day for men and 1 drink a day for women. Too much alcohol can cause health problems. · Do not smoke. Smoking puts you at a much higher risk for osteoporosis. If you need help quitting, talk to your doctor about stop-smoking programs and medicines. These can increase your chances of quitting for good. · Get regular bone-building exercise. Weight-bearing and resistance exercises keep bones healthy by working the muscles and bones against gravity. Start out at an exercise level that feels right for you. Add a little at a time until you can do the following: ? Do 30 minutes of weight-bearing exercise on most days of the week. Walking, jogging, stair climbing, and dancing are good choices. ? Do resistance exercises with weights or elastic bands 2 to 3 days a week. · Reduce your risk of falls: 
? Wear supportive shoes with low heels and nonslip soles. ? Use a cane or walker, if you need it. Use shower chairs and bath benches. Put in handrails on stairways, around your shower or tub area, and near the toilet. ? Keep stairs, porches, and walkways well lit. Use night-lights. ? Remove throw rugs and other objects that are in the way. ? Avoid icy, wet, or slippery surfaces. ?  Keep a cordless phone and a flashlight with new batteries by your bed. When should you call for help? Watch closely for changes in your health, and be sure to contact your doctor if you have any problems. Where can you learn more? Go to http://www.gray.com/ Enter K100 in the search box to learn more about \"Osteoporosis: Care Instructions. \" Current as of: December 7, 2020               Content Version: 12.8 © 2006-2021 [employer]. Care instructions adapted under license by [employer] (which disclaims liability or warranty for this information). If you have questions about a medical condition or this instruction, always ask your healthcare professional. [address] any warranty or liability for your use of this information.

## 2021-05-20 NOTE — PROGRESS NOTES
Patient Name: Freya Mcgraw   MRN: [id number]    Radha Rodriguez is a 80 y.o. female who presents with the following: Here with .  has noticed that patient gets a little bit more short of breath when going up the stairs. No prior history of heart or lung issues. Denies any weight gain or leg swelling. They were previously doing home health PT and OT but would like to hold off for now. Has stopped Fosamax given drug holiday. Wt Readings from Last 3 Encounters:   05/20/21 168 lb (76.2 kg)   10/29/20 163 lb (73.9 kg)   08/13/20 168 lb (76.2 kg)     Review of Systems   Unable to perform ROS: Dementia       The patient's medications, allergies, past medical history, surgical history, family history and social history were reviewed and updated where appropriate. Prior to Admission medications    Medication Sig Start Date End Date Taking? Authorizing Provider   clindamycin (CLEOCIN) 150 mg capsule TAKE 4 CAPSULES BY MOUTH 1 HOUR PRIOR TO APPOINTMENT 5/17/21  Yes Provider, Historical   cholecalciferol (Vitamin D3) (2,000 UNITS /50 MCG) cap capsule TAKE 1 TABLET BY MOUTH EVERY DAY 7/28/20  Yes Maximiliano Martinez MD   alendronate (FOSAMAX) 70 mg tablet TAKE 1 TABLET BY MOUTH EVERY 7 DAYS  Patient not taking: Reported on 5/20/2021 5/7/20   Marjorie Wilson NP   calcium-vitamin D (CALCIUM+D) 500 mg(1,250mg) -200 unit per tablet Take 1 Tab by mouth two (2) times daily (with meals).   Patient not taking: Reported on 5/20/2021    Provider, Historical       Allergies   Allergen Reactions    Morphine Nausea and Vomiting    Codeine Nausea and Vomiting     GI upset    Gentamicin Other (comments)    Hydrocodone Nausea and Vomiting    Iodine Rash     fever    Pcn [Penicillins] Other (comments)     childhood    Tetracycline Other (comments)     Change of mental status    Tramadol Nausea and Vomiting    Fish Oil [Omega 3-Dha-Epa-Fish Oil] Other (comments)     Belching consistently OBJECTIVE    Visit Vitals  /79 (BP 1 Location: Left upper arm, BP Patient Position: Sitting, BP Cuff Size: Adult)   Pulse 71   Temp 97.7 °F (36.5 °C) (Temporal)   Resp 18   Ht 5' 6\" (1.676 m)   Wt 168 lb (76.2 kg)   SpO2 96%   BMI 27.12 kg/m²       Physical Exam  Vitals and nursing note reviewed. Constitutional:       General: She is not in acute distress. Appearance: She is not diaphoretic. Eyes:      Conjunctiva/sclera: Conjunctivae normal.      Pupils: Pupils are equal, round, and reactive to light. Cardiovascular:      Rate and Rhythm: Normal rate and regular rhythm. Heart sounds: Normal heart sounds. No murmur heard. No friction rub. No gallop. Pulmonary:      Effort: Pulmonary effort is normal. No respiratory distress. Breath sounds: Normal breath sounds. No wheezing. Musculoskeletal:      Right lower leg: No edema. Left lower leg: No edema. Skin:     General: Skin is warm and dry. Neurological:      Mental Status: She is alert. ASSESSMENT AND PLAN  Hair Davis is a 80 y.o. female who presents today for:    1. Moderate dementia without behavioral disturbance (HCC)  Stable, continue current treatment. 2. SOBOE (shortness of breath on exertion)  May be due to age/physical deconditioning.  - NT-PRO BNP; Future    3. Hyperlipidemia, unspecified hyperlipidemia type  - CBC W/O DIFF; Future  - METABOLIC PANEL, COMPREHENSIVE; Future  - LIPID PANEL; Future    4. Pre-diabetes  - HEMOGLOBIN A1C WITH EAG; Future    5. Osteoporosis, unspecified osteoporosis type, unspecified pathological fracture presence  - VITAMIN D, 25 HYDROXY; Future       Medications Discontinued During This Encounter   Medication Reason    calcium-vitamin D (CALCIUM+D) 500 mg(1,250mg) -200 unit per tablet LIST CLEANUP    alendronate (FOSAMAX) 70 mg tablet LIST CLEANUP       Follow-up and Dispositions    · Return in about 3 months (around 8/20/2021) for lab appt.          Treatment risks/benefits/costs/interactions/alternatives discussed with patient. Advised patient to call back or return to office if symptoms worsen/change/persist. If patient cannot reach us or should anything more severe/urgent arise he/she should proceed directly to the nearest emergency department. Discussed expected course/resolution/complications of diagnosis in detail with patient. Patient expressed understanding with the diagnosis and plan. Omaira Morfin M.D.

## 2021-05-20 NOTE — PROGRESS NOTES
Chief Complaint   Patient presents with    Follow-up     Routine care     1. Have you been to the ER, urgent care clinic since your last visit? Hospitalized since your last visit? No    2. Have you seen or consulted any other health care providers outside of the [address] [place] [address] since your last visit? Include any pap smears or colon screening.  No

## 2021-08-20 ENCOUNTER — LAB ONLY (OUTPATIENT)
Dept: FAMILY MEDICINE CLINIC | Age: 86
End: 2021-08-20

## 2021-08-20 DIAGNOSIS — M81.0 OSTEOPOROSIS, UNSPECIFIED OSTEOPOROSIS TYPE, UNSPECIFIED PATHOLOGICAL FRACTURE PRESENCE: ICD-10-CM

## 2021-08-20 DIAGNOSIS — R73.03 PRE-DIABETES: ICD-10-CM

## 2021-08-20 DIAGNOSIS — R06.02 SOBOE (SHORTNESS OF BREATH ON EXERTION): ICD-10-CM

## 2021-08-20 DIAGNOSIS — E78.5 HYPERLIPIDEMIA, UNSPECIFIED HYPERLIPIDEMIA TYPE: ICD-10-CM

## 2021-08-20 LAB
25(OH)D3 SERPL-MCNC: 23.7 NG/ML (ref 30–100)
ALBUMIN SERPL-MCNC: 3.5 G/DL (ref 3.5–5)
ALBUMIN/GLOB SERPL: 1 {RATIO} (ref 1.1–2.2)
ALP SERPL-CCNC: 108 U/L (ref 45–117)
ALT SERPL-CCNC: 16 U/L (ref 12–78)
ANION GAP SERPL CALC-SCNC: 7 MMOL/L (ref 5–15)
AST SERPL-CCNC: 12 U/L (ref 15–37)
BILIRUB SERPL-MCNC: 1.1 MG/DL (ref 0.2–1)
BNP SERPL-MCNC: 143 PG/ML
BUN SERPL-MCNC: 20 MG/DL (ref 6–20)
BUN/CREAT SERPL: 25 (ref 12–20)
CALCIUM SERPL-MCNC: 9 MG/DL (ref 8.5–10.1)
CHLORIDE SERPL-SCNC: 105 MMOL/L (ref 97–108)
CHOLEST SERPL-MCNC: 334 MG/DL
CO2 SERPL-SCNC: 24 MMOL/L (ref 21–32)
CREAT SERPL-MCNC: 0.81 MG/DL (ref 0.55–1.02)
ERYTHROCYTE [DISTWIDTH] IN BLOOD BY AUTOMATED COUNT: 13.3 % (ref 11.5–14.5)
EST. AVERAGE GLUCOSE BLD GHB EST-MCNC: 117 MG/DL
GLOBULIN SER CALC-MCNC: 3.6 G/DL (ref 2–4)
GLUCOSE SERPL-MCNC: 102 MG/DL (ref 65–100)
HBA1C MFR BLD: 5.7 % (ref 4–5.6)
HCT VFR BLD AUTO: 42.6 % (ref 35–47)
HDLC SERPL-MCNC: 74 MG/DL
HDLC SERPL: 4.5 {RATIO} (ref 0–5)
HGB BLD-MCNC: 14.6 G/DL (ref 11.5–16)
LDLC SERPL CALC-MCNC: 229.4 MG/DL (ref 0–100)
MCH RBC QN AUTO: 31.7 PG (ref 26–34)
MCHC RBC AUTO-ENTMCNC: 34.3 G/DL (ref 30–36.5)
MCV RBC AUTO: 92.4 FL (ref 80–99)
NRBC # BLD: 0 K/UL (ref 0–0.01)
NRBC BLD-RTO: 0 PER 100 WBC
PLATELET # BLD AUTO: 202 K/UL (ref 150–400)
PMV BLD AUTO: 10.3 FL (ref 8.9–12.9)
POTASSIUM SERPL-SCNC: 3.8 MMOL/L (ref 3.5–5.1)
PROT SERPL-MCNC: 7.1 G/DL (ref 6.4–8.2)
RBC # BLD AUTO: 4.61 M/UL (ref 3.8–5.2)
SODIUM SERPL-SCNC: 136 MMOL/L (ref 136–145)
TRIGL SERPL-MCNC: 153 MG/DL (ref ?–150)
VLDLC SERPL CALC-MCNC: 30.6 MG/DL
WBC # BLD AUTO: 5.1 K/UL (ref 3.6–11)

## 2021-08-23 RX ORDER — ASPIRIN 325 MG
TABLET, DELAYED RELEASE (ENTERIC COATED) ORAL
Qty: 8 CAPSULE | Refills: 0 | Status: SHIPPED | OUTPATIENT
Start: 2021-08-23 | End: 2021-08-26 | Stop reason: SDUPTHER

## 2021-08-26 RX ORDER — ASPIRIN 325 MG
TABLET, DELAYED RELEASE (ENTERIC COATED) ORAL
Qty: 8 CAPSULE | Refills: 0 | Status: SHIPPED | OUTPATIENT
Start: 2021-08-26 | End: 2021-10-12

## 2021-08-26 NOTE — PROGRESS NOTES
Called, spoke to pt.    Two pt identifiers confirmed. Writer informed  that med was resent and the recommendation of sugar intake. Pt verbalized understanding of information discussed w/ no further questions at this time.

## 2021-08-26 NOTE — PROGRESS NOTES
Called, spoke to pt.  Two pt identifiers confirmed. Writer informed patient of new med order and  also stated that he cancel Vit D because he did not know what it was. He stated please send the order back. Also his wife the patient has dementia and all she is eat is sweets and this is why her A1C is getting higher.  Please Advise

## 2021-09-16 ENCOUNTER — TELEPHONE (OUTPATIENT)
Dept: FAMILY MEDICINE CLINIC | Age: 86
End: 2021-09-16

## 2021-09-16 NOTE — TELEPHONE ENCOUNTER
TC to Patient. ID verified by  on [facility] Called and advised patient needs appointment to eval.  declined VV Patient scheduled for next available Oct 12 at [facility] would call her Cardiologist and see if she can be seen there sooner they can address swelling in her legs as well.

## 2021-09-16 NOTE — TELEPHONE ENCOUNTER
states that feet and ankles are swollen/states pt stays very cold/states that pt sits in one chair all day with a heavy blanket/pt is requesting a callback to discuss/also wants to know if the flu shot can be administered now     Best contact number(s): [phone]

## 2021-10-12 ENCOUNTER — OFFICE VISIT (OUTPATIENT)
Dept: FAMILY MEDICINE CLINIC | Age: 86
End: 2021-10-12

## 2021-10-12 VITALS
RESPIRATION RATE: 18 BRPM | WEIGHT: 166.2 LBS | TEMPERATURE: 97.4 F | BODY MASS INDEX: 26.71 KG/M2 | OXYGEN SATURATION: 96 % | SYSTOLIC BLOOD PRESSURE: 130 MMHG | HEIGHT: 66 IN | HEART RATE: 71 BPM | DIASTOLIC BLOOD PRESSURE: 80 MMHG

## 2021-10-12 DIAGNOSIS — Z00.00 ENCOUNTER FOR MEDICARE ANNUAL WELLNESS EXAM: Primary | ICD-10-CM

## 2021-10-12 DIAGNOSIS — Z23 NEEDS FLU SHOT: ICD-10-CM

## 2021-10-12 DIAGNOSIS — R68.89 COLD INTOLERANCE: ICD-10-CM

## 2021-10-12 DIAGNOSIS — R06.02 SOB (SHORTNESS OF BREATH): ICD-10-CM

## 2021-10-12 LAB
ERYTHROCYTE [DISTWIDTH] IN BLOOD BY AUTOMATED COUNT: 13.2 % (ref 11.5–14.5)
HCT VFR BLD AUTO: 44.1 % (ref 35–47)
HGB BLD-MCNC: 14.9 G/DL (ref 11.5–16)
MCH RBC QN AUTO: 31.6 PG (ref 26–34)
MCHC RBC AUTO-ENTMCNC: 33.8 G/DL (ref 30–36.5)
MCV RBC AUTO: 93.6 FL (ref 80–99)
NRBC # BLD: 0 K/UL (ref 0–0.01)
NRBC BLD-RTO: 0 PER 100 WBC
PLATELET # BLD AUTO: 230 K/UL (ref 150–400)
PMV BLD AUTO: 10.2 FL (ref 8.9–12.9)
RBC # BLD AUTO: 4.71 M/UL (ref 3.8–5.2)
T4 SERPL-MCNC: 10.5 UG/DL (ref 4.8–13.9)
TSH SERPL DL<=0.05 MIU/L-ACNC: 2.14 UIU/ML (ref 0.36–3.74)
WBC # BLD AUTO: 6 K/UL (ref 3.6–11)

## 2021-10-12 PROCEDURE — 1090F PRES/ABSN URINE INCON ASSESS: CPT | Performed by: FAMILY MEDICINE

## 2021-10-12 PROCEDURE — G8536 NO DOC ELDER MAL SCRN: HCPCS | Performed by: FAMILY MEDICINE

## 2021-10-12 PROCEDURE — 99214 OFFICE O/P EST MOD 30 MIN: CPT | Performed by: FAMILY MEDICINE

## 2021-10-12 PROCEDURE — G0439 PPPS, SUBSEQ VISIT: HCPCS | Performed by: FAMILY MEDICINE

## 2021-10-12 PROCEDURE — G0008 ADMIN INFLUENZA VIRUS VAC: HCPCS | Performed by: FAMILY MEDICINE

## 2021-10-12 PROCEDURE — G8419 CALC BMI OUT NRM PARAM NOF/U: HCPCS | Performed by: FAMILY MEDICINE

## 2021-10-12 PROCEDURE — 1101F PT FALLS ASSESS-DOCD LE1/YR: CPT | Performed by: FAMILY MEDICINE

## 2021-10-12 PROCEDURE — 90694 VACC AIIV4 NO PRSRV 0.5ML IM: CPT | Performed by: FAMILY MEDICINE

## 2021-10-12 PROCEDURE — G8510 SCR DEP NEG, NO PLAN REQD: HCPCS | Performed by: FAMILY MEDICINE

## 2021-10-12 PROCEDURE — G8427 DOCREV CUR MEDS BY ELIG CLIN: HCPCS | Performed by: FAMILY MEDICINE

## 2021-10-12 NOTE — PROGRESS NOTES
Chief Complaint   Patient presents with    Foot Swelling     FOR 4 WEEKS    Shortness of Breath       1. \"Have you been to the ER, urgent care clinic since your last visit? Hospitalized since your last visit? \" No    2. \"Have you seen or consulted any other health care providers outside of the [address] [place] [address] since your last visit? \" No     3. For patients aged 39-70: Has the patient had a colonoscopy? No     If the patient is female:    4. For patients aged 41-77: Has the patient had a mammogram within the past 2 years? No    5. For patients aged 21-30: Has the patient had a pap smear?  No     3 most recent PHQ Screens 10/12/2021   Little interest or pleasure in doing things Not at all   Feeling down, depressed, irritable, or hopeless Not at all   Total Score PHQ 2 0

## 2021-10-12 NOTE — PROGRESS NOTES
Patient Name: Kiley Crowe   MRN: [id number]    Sindy Jennings is a 80 y.o. female who presents with the following:     Patient has had some mild left leg ankle/leg swelling for the past 4 weeks. She also is noted to seem more short of breath and panting with any movement, especially going up and down stairs. No prior history of smoking, lung or heart issues. Denies any recent URI or fevers. Leg swelling is not bothersome to the patient. She also has had a chronic history of always feeling cold. Last TSH was checked over a year ago. BP Readings from Last 3 Encounters:   10/12/21 130/80   05/20/21 123/79   11/27/20 128/76     Wt Readings from Last 3 Encounters:   10/12/21 166 lb 3.2 oz (75.4 kg)   05/20/21 168 lb (76.2 kg)   10/29/20 163 lb (73.9 kg)     Review of Systems   Constitutional: Negative for fever, malaise/fatigue and weight loss. Respiratory: Positive for shortness of breath. Negative for cough, hemoptysis and wheezing. Cardiovascular: Positive for leg swelling. Negative for chest pain, palpitations and PND. Gastrointestinal: Negative for abdominal pain, constipation, diarrhea, nausea and vomiting. The patient's medications, allergies, past medical history, surgical history, family history and social history were reviewed and updated where appropriate.       Current Outpatient Medications:     cholecalciferol (Vitamin D3) (2,000 UNITS /50 MCG) cap capsule, TAKE 1 TABLET BY MOUTH EVERY DAY, Disp: 90 Cap, Rfl: 3    Allergies   Allergen Reactions    Morphine Nausea and Vomiting    Codeine Nausea and Vomiting     GI upset    Gentamicin Other (comments)    Hydrocodone Nausea and Vomiting    Iodine Rash     fever    Pcn [Penicillins] Other (comments)     childhood    Tetracycline Other (comments)     Change of mental status    Tramadol Nausea and Vomiting    Fish Oil [Omega 3-Dha-Epa-Fish Oil] Other (comments)     Belching consistently          OBJECTIVE    Visit Vitals  /80 (BP 1 Location: Right arm, BP Patient Position: Sitting, BP Cuff Size: Adult)   Pulse 71   Temp 97.4 °F (36.3 °C) (Oral)   Resp 18   Ht 5' 6\" (1.676 m)   Wt 166 lb 3.2 oz (75.4 kg)   SpO2 96%   BMI 26.83 kg/m²       Physical Exam  Vitals and nursing note reviewed. Constitutional:       General: She is not in acute distress. Appearance: She is not diaphoretic. Eyes:      Conjunctiva/sclera: Conjunctivae normal.      Pupils: Pupils are equal, round, and reactive to light. Cardiovascular:      Rate and Rhythm: Normal rate and regular rhythm. Heart sounds: Normal heart sounds. No murmur heard. No friction rub. No gallop. Pulmonary:      Effort: Pulmonary effort is normal. No respiratory distress. Breath sounds: Rales (Bibasilar Rales) present. No wheezing. Musculoskeletal:      Right lower leg: No edema. Left lower leg: Edema (mild pitting edema around left ankle.) present. Skin:     General: Skin is warm and dry. Neurological:      Mental Status: She is alert. ASSESSMENT AND PLAN  Pepper Banks is a 80 y.o. female who presents today for:    1. Encounter for Medicare annual wellness exam    2. SOB (shortness of breath)  Will rule out heart failure. Consider pulmonary referral if work up negative. Could be due to physical deconditioning.- ECHO ADULT COMPLETE; Future  - XR CHEST PA LAT; Future  - CBC W/O DIFF; Future    3. Cold intolerance    - TSH 3RD GENERATION; Future  - T4 (THYROXINE); Future    4. Needs flu shot  - INFLUENZA, HIGH DOSE SEASONAL       Medications Discontinued During This Encounter   Medication Reason    cholecalciferol (VITAMIN D3) (50,000 UNITS /1250 MCG) capsule LIST CLEANUP    clindamycin (CLEOCIN) 150 mg capsule LIST CLEANUP           Treatment risks/benefits/costs/interactions/alternatives discussed with patient.   Advised patient to call back or return to office if symptoms worsen/change/persist. If patient cannot reach us or should anything more severe/urgent arise he/she should proceed directly to the nearest emergency department. Discussed expected course/resolution/complications of diagnosis in detail with patient. Patient expressed understanding with the diagnosis and plan. This dictation may have been completed with Dragon, the computer voice recognition software. Unanticipated grammatical, syntax, homophones, and other interpretive errors are sometimes inadvertently transcribed by the computer software. Please disregard any errors that have escaped final proofreading. Omaira Collins M.D.

## 2021-10-12 NOTE — PROGRESS NOTES
This is the Subsequent Medicare Annual Wellness Exam, performed 12 months or more after the Initial AWV or the last Subsequent AWV    I have reviewed the patient's medical history in detail and updated the computerized patient record. Assessment/Plan   Education and counseling provided:  Are appropriate based on today's review and evaluation    1. Encounter for Medicare annual wellness exam  2. SOB (shortness of breath)  -     ECHO ADULT COMPLETE; Future  -     XR CHEST PA LAT; Future  -     CBC W/O DIFF; Future  3. Cold intolerance  -     TSH 3RD GENERATION; Future  -     T4 (THYROXINE); Future  4. Needs flu shot  -     INFLUENZA, HIGH DOSE SEASONAL       Depression Risk Factor Screening     3 most recent PHQ Screens 10/12/2021   Little interest or pleasure in doing things Not at all   Feeling down, depressed, irritable, or hopeless Not at all   Total Score PHQ 2 0       Alcohol Risk Screen    Do you average more than 1 drink per night or more than 7 drinks a week:  No    On any one occasion in the past three months have you have had more than 3 drinks containing alcohol:  No        Functional Ability and Level of Safety    Hearing: Hearing is good. Activities of Daily Living: The home contains: handrails and grab bars  Patient needs help with:  transportation, shopping, preparing meals, laundry, housework, managing medications and managing money       Ambulation: with no difficulty     Fall Risk:  Fall Risk Assessment, last 12 mths 10/12/2021   Able to walk? Yes   Fall in past 12 months? 0   Do you feel unsteady?  0   Are you worried about falling 0      Abuse Screen:  Patient is not abused       Cognitive Screening    Has your family/caregiver stated any concerns about your memory: yes - hx of dementia       Health Maintenance Due     Health Maintenance Due   Topic Date Due    Shingrix Vaccine Age 49> (2 of 2) 06/24/2019    Medicare Yearly Exam  10/30/2021       Patient Care Team   Patient Care Team:  Katrin Chapman MD as PCP - General (Family Medicine)  Katrin Chapman MD as PCP - [facility] Empaneled Provider  Genesis Wilks MD (Neurology)    History     Patient Active Problem List   Diagnosis Code    Arthritis of knee, right M17.11    Insomnia G47.00    Overactive bladder N32.81    AR (allergic rhinitis) J30.9    Osteoarthritis of multiple joints M15.9    GERD (gastroesophageal reflux disease) K21.9    Hypercholesterolemia E78.00    Pre-diabetes R73.03    Osteopenia M85.80    Unspecified vitamin D deficiency E55.9    Urinary incontinence R32    Advance care planning Z71.89    Moderate dementia without behavioral disturbance ([facility] [address] F03.90     Past Medical History:   Diagnosis Date    AR (allergic rhinitis)     DJD (degenerative joint disease)     GERD (gastroesophageal reflux disease)     Hypercholesterolemia     Insomnia 11/13/2013    Osteopenia 11/2010    repeat DEXA 2017; on Fosamax 35 mg weekly    Pre-diabetes     Unspecified vitamin D deficiency 2009    Urinary incontinence     mild      Past Surgical History:   Procedure Laterality Date    HX ORTHOPAEDIC  10/99     R hip replacement >infected redone 4/00>10/00    HX ORTHOPAEDIC      LT KNEE ARTHROSCOPY    HX ORTHOPAEDIC  7/15/13    R knee replacement    HX TONSIL AND ADENOIDECTOMY      age 9     Current Outpatient Medications   Medication Sig Dispense Refill    cholecalciferol (Vitamin D3) (2,000 UNITS /50 MCG) cap capsule TAKE 1 TABLET BY MOUTH EVERY DAY 90 Cap 3     Allergies   Allergen Reactions    Morphine Nausea and Vomiting    Codeine Nausea and Vomiting     GI upset    Gentamicin Other (comments)    Hydrocodone Nausea and Vomiting    Iodine Rash     fever    Pcn [Penicillins] Other (comments)     childhood    Tetracycline Other (comments)     Change of mental status    Tramadol Nausea and Vomiting    Fish Oil [Omega 3-Dha-Epa-Fish Oil] Other (comments)     Belching consistently        Family History Problem Relation Age of Onset    Psychiatric Disorder Daughter         drug overdose    Cancer Sister         lung     Social History     Tobacco Use    Smoking status: Never Smoker    Smokeless tobacco: Never Used   Substance Use Topics    Alcohol use: Yes     Comment: 1-2 glasses of wine per night         Susan Cloud MD

## 2021-10-18 NOTE — PROGRESS NOTES
Called patient PHI. Two patient identifiers verified. Discussed lab results per provider's note. Verbalized understanding.

## 2021-10-25 ENCOUNTER — HOSPITAL ENCOUNTER (OUTPATIENT)
Dept: NON INVASIVE DIAGNOSTICS | Age: 86
Discharge: HOME | End: 2021-10-25
Attending: FAMILY MEDICINE
Payer: MEDICARE

## 2021-10-25 VITALS
SYSTOLIC BLOOD PRESSURE: 130 MMHG | HEIGHT: 66 IN | DIASTOLIC BLOOD PRESSURE: 80 MMHG | WEIGHT: 166.23 LBS | BODY MASS INDEX: 26.71 KG/M2

## 2021-10-25 DIAGNOSIS — R06.02 SOB (SHORTNESS OF BREATH): ICD-10-CM

## 2021-10-25 LAB
ECHO AV PEAK GRADIENT: 2.62 MMHG
ECHO AV PEAK VELOCITY: 80.95 CM/S
ECHO EST RA PRESSURE: 3 MMHG
ECHO LVOT PEAK GRADIENT: 2.23 MMHG
ECHO LVOT PEAK VELOCITY: 74.61 CM/S
ECHO MV A VELOCITY: 81.75 CM/S
ECHO MV E VELOCITY: 46.48 CM/S
ECHO MV E/A RATIO: 0.57
ECHO RIGHT VENTRICULAR SYSTOLIC PRESSURE (RVSP): 41.68 MMHG
ECHO TV REGURGITANT MAX VELOCITY: 310.96 CM/S
ECHO TV REGURGITANT PEAK GRADIENT: 38.68 MMHG

## 2021-10-25 PROCEDURE — 93306 TTE W/DOPPLER COMPLETE: CPT

## 2021-10-28 ENCOUNTER — TELEPHONE (OUTPATIENT)
Dept: FAMILY MEDICINE CLINIC | Age: 86
End: 2021-10-28

## 2021-10-28 DIAGNOSIS — R06.02 SOB (SHORTNESS OF BREATH): Primary | ICD-10-CM

## 2021-10-28 NOTE — TELEPHONE ENCOUNTER
----- Message from Spenser Luo MD sent at 10/28/2021 11:19 AM EDT -----  Please call patient:    Ultrasound of her heart shows her function is normal although they could not clearly visualized all areas of her heart. Recommend that she see a lung doctor for her shortness of breath. Referral in place.

## 2022-03-19 PROBLEM — F03.B0 MODERATE DEMENTIA WITHOUT BEHAVIORAL DISTURBANCE (HCC): Status: ACTIVE | Noted: 2021-05-20

## 2022-03-20 PROBLEM — Z71.89 ADVANCE CARE PLANNING: Status: ACTIVE | Noted: 2017-01-06

## 2022-05-06 ENCOUNTER — TELEPHONE (OUTPATIENT)
Dept: FAMILY MEDICINE CLINIC | Age: 87
End: 2022-05-06

## 2022-05-06 NOTE — TELEPHONE ENCOUNTER
Please call patient to be seen in person. Cannot be treated over the phone for her complaints. Thank you!

## 2022-05-10 ENCOUNTER — OFFICE VISIT (OUTPATIENT)
Dept: FAMILY MEDICINE CLINIC | Age: 87
End: 2022-05-10
Payer: MEDICARE

## 2022-05-10 VITALS
SYSTOLIC BLOOD PRESSURE: 142 MMHG | HEIGHT: 66 IN | WEIGHT: 169.4 LBS | TEMPERATURE: 98 F | HEART RATE: 70 BPM | BODY MASS INDEX: 27.23 KG/M2 | RESPIRATION RATE: 16 BRPM | OXYGEN SATURATION: 98 % | DIASTOLIC BLOOD PRESSURE: 84 MMHG

## 2022-05-10 DIAGNOSIS — R06.02 SOB (SHORTNESS OF BREATH): ICD-10-CM

## 2022-05-10 DIAGNOSIS — F03.B0 MODERATE DEMENTIA WITHOUT BEHAVIORAL DISTURBANCE: ICD-10-CM

## 2022-05-10 DIAGNOSIS — R26.9 GAIT DIFFICULTY: Primary | ICD-10-CM

## 2022-05-10 DIAGNOSIS — B35.1 TOENAIL FUNGUS: ICD-10-CM

## 2022-05-10 PROCEDURE — G8510 SCR DEP NEG, NO PLAN REQD: HCPCS | Performed by: FAMILY MEDICINE

## 2022-05-10 PROCEDURE — G8427 DOCREV CUR MEDS BY ELIG CLIN: HCPCS | Performed by: FAMILY MEDICINE

## 2022-05-10 PROCEDURE — 1101F PT FALLS ASSESS-DOCD LE1/YR: CPT | Performed by: FAMILY MEDICINE

## 2022-05-10 PROCEDURE — 99214 OFFICE O/P EST MOD 30 MIN: CPT | Performed by: FAMILY MEDICINE

## 2022-05-10 PROCEDURE — G8536 NO DOC ELDER MAL SCRN: HCPCS | Performed by: FAMILY MEDICINE

## 2022-05-10 PROCEDURE — G8419 CALC BMI OUT NRM PARAM NOF/U: HCPCS | Performed by: FAMILY MEDICINE

## 2022-05-10 PROCEDURE — 1090F PRES/ABSN URINE INCON ASSESS: CPT | Performed by: FAMILY MEDICINE

## 2022-05-10 NOTE — PROGRESS NOTES
Patient Name: Dona Dancer   MRN: [id number]    Irvin Peters is a 80 y.o. female who presents with the following: here with daughter. Since Thursday, patient has been unable to walk without assistance of a walker or holding onto something. A neighbor had to help her off of the toilet last Thursday and daughter has noticed that she is unable to walk since. Pt denies any pain. No recent falls. She does have a history of a right hip replacement and right knee replacement. She continues to have SOB and labored breathing. This was addressed at our last visit in October. Work up was normal for CXR and TTE; it was recommended for her to follow-up with pulmonology which has not yet been done. Patient has very thick and long toenails which are difficult to cut; daughter would like podiatrist.    Wt Readings from Last 3 Encounters:   05/10/22 169 lb 6.4 oz (76.8 kg)   10/25/21 166 lb 3.6 oz (75.4 kg)   10/12/21 166 lb 3.2 oz (75.4 kg)     Review of Systems   Unable to perform ROS: Dementia       The patient's medications, allergies, past medical history, surgical history, family history and social history were reviewed and updated where appropriate.       Current Outpatient Medications:     cholecalciferol (Vitamin D3) (2,000 UNITS /50 MCG) cap capsule, TAKE 1 TABLET BY MOUTH EVERY DAY, Disp: 90 Cap, Rfl: 3    Allergies   Allergen Reactions    Morphine Nausea and Vomiting    Codeine Nausea and Vomiting     GI upset    Gentamicin Other (comments)    Hydrocodone Nausea and Vomiting    Iodine Rash     fever    Pcn [Penicillins] Other (comments)     childhood    Tetracycline Other (comments)     Change of mental status    Tramadol Nausea and Vomiting    Fish Oil [Omega 3-Dha-Epa-Fish Oil] Other (comments)     Belching consistently          OBJECTIVE    Visit Vitals  BP (!) 142/84 (BP 1 Location: Right arm, BP Patient Position: Sitting, BP Cuff Size: Adult)   Pulse 70   Temp 98 °F (36.7 °C) (Temporal)   Resp 16   Ht 5' 6\" (1.676 m)   Wt 169 lb 6.4 oz (76.8 kg)   SpO2 98%   BMI 27.34 kg/m²       Physical Exam  Vitals and nursing note reviewed. Constitutional:       General: She is not in acute distress. Appearance: She is not diaphoretic. Eyes:      Conjunctiva/sclera: Conjunctivae normal.      Pupils: Pupils are equal, round, and reactive to light. Cardiovascular:      Rate and Rhythm: Normal rate and regular rhythm. Heart sounds: Normal heart sounds. No murmur heard. No friction rub. No gallop. Pulmonary:      Effort: Pulmonary effort is normal. No respiratory distress. Breath sounds: Rales (Noted on bilateral lung bases) present. No wheezing. Musculoskeletal:      Right hip: Tenderness (Greater trochanter) present. No deformity or lacerations. Left hip: Tenderness (Greater trochanter) present. No deformity or lacerations. Right knee: Normal. No swelling or deformity. Normal range of motion. No tenderness. Left knee: Normal. No swelling or deformity. Normal range of motion. No tenderness. Skin:     General: Skin is warm and dry. Neurological:      Mental Status: She is alert. ASSESSMENT AND PLAN  Ina Angulo is a 80 y.o. female who presents today for:    1. Gait difficulty  Difficulty to get history from patient due to dementia. Will obtain xrays to rule out fracture. Consider HH PT/OT for gait strengthening.  - XR KNEE RT 3 V; Future  - XR KNEE LT 3 V; Future  - XR HIPS BI W OR WO AP PELV; Future    2. SOB (shortness of breath)  Will get updated CXR given exam findings. Recommend pulmonary referral.  - REFERRAL TO PULMONARY DISEASE  - XR CHEST PA LAT; Future    3. Moderate dementia without behavioral disturbance (HCC)  Consider HH for PT/OT. 4. Toenail fungus  - REFERRAL TO PODIATRY       There are no discontinued medications. Treatment risks/benefits/costs/interactions/alternatives discussed with patient.   Advised patient to call back or return to office if symptoms worsen/change/persist. If patient cannot reach us or should anything more severe/urgent arise he/she should proceed directly to the nearest emergency department. Discussed expected course/resolution/complications of diagnosis in detail with patient. Patient expressed understanding with the diagnosis and plan. This dictation may have been completed with Dragon, the computer voice recognition software. Unanticipated grammatical, syntax, homophones, and other interpretive errors are sometimes inadvertently transcribed by the computer software. Please disregard any errors that have escaped final proofreading. Omaira Tai M.D.

## 2022-05-10 NOTE — PROGRESS NOTES
Chief Complaint   Patient presents with    Knee Pain     left; unsure if it is knee or hip    Hip Pain     left; unsure if it is knee or hip     Breathing Problem     labored breathing    Referral Request     podiatry for toenails to be clipped        1. \"Have you been to the ER, urgent care clinic since your last visit? Hospitalized since your last visit? \" No    2. \"Have you seen or consulted any other health care providers outside of the [address] [place] [address] since your last visit? \" No     3. For patients aged 39-70: Has the patient had a colonoscopy / FIT/ Cologuard? NA - based on age      If the patient is female:    4. For patients aged 41-77: Has the patient had a mammogram within the past 2 years? NA - based on age or sex      11. For patients aged 21-65: Has the patient had a pap smear?  NA - based on age or sex    3 most recent PHQ Screens 5/10/2022   Little interest or pleasure in doing things Not at all   Feeling down, depressed, irritable, or hopeless Not at all   Total Score PHQ 2 0

## 2022-05-19 ENCOUNTER — HOME HEALTH ADMISSION (OUTPATIENT)
Dept: HOME HEALTH SERVICES | Facility: HOME HEALTH | Age: 87
End: 2022-05-19
Payer: MEDICARE

## 2022-05-19 ENCOUNTER — TELEPHONE (OUTPATIENT)
Dept: FAMILY MEDICINE CLINIC | Age: 87
End: 2022-05-19

## 2022-05-19 DIAGNOSIS — R26.9 GAIT DIFFICULTY: Primary | ICD-10-CM

## 2022-05-19 DIAGNOSIS — F03.B0 MODERATE DEMENTIA WITHOUT BEHAVIORAL DISTURBANCE: ICD-10-CM

## 2022-05-19 NOTE — TELEPHONE ENCOUNTER
----- Message from Sandrita Carver sent at 5/18/2022  3:19 PM EDT -----  Subject: Referral Request    QUESTIONS   Reason for referral request? Physical Therapy   Has the physician seen you for this condition before? No   Preferred Specialist (if applicable)? Do you already have an appointment scheduled? No  Additional Information for Provider? [address] [facility]   [phone] PENNY#[phone]  ---------------------------------------------------------------------------  --------------  Ciera BROOKS  What is the best way for the office to contact you? OK to leave message on   voicemail  Preferred Call Back Phone Number? [phone]  ---------------------------------------------------------------------------  --------------  SCRIPT ANSWERS  Relationship to Patient? Other  Representative Name? Bambi Ridley  Is the Representative on the appropriate HIPAA document in Epic?  Yes

## 2022-05-20 ENCOUNTER — TELEPHONE (OUTPATIENT)
Dept: FAMILY MEDICINE CLINIC | Age: 87
End: 2022-05-20

## 2022-05-20 NOTE — TELEPHONE ENCOUNTER
----- Message from Too Rios sent at 5/17/2022  3:54 PM EDT -----  Subject: Message to Provider    QUESTIONS  Information for Provider? Patients  called to request assistance in   scheduling at home physical therapy advised by provider Michelle Ibarra. Please   contact patient or  Zeinab Lam to discuss course of care.   ---------------------------------------------------------------------------  --------------  CALL BACK INFO  What is the best way for the office to contact you? OK to leave message on   voicemail  Preferred Call Back Phone Number? [phone]  ---------------------------------------------------------------------------  --------------  SCRIPT ANSWERS  Relationship to Patient? Other  Representative Name?    Is the Representative on the appropriate HIPAA document in Epic?  Yes

## 2022-05-20 NOTE — TELEPHONE ENCOUNTER
Chief Complaint   Patient presents with    Referral Follow Up     Home Health :  physical therapy at home. Spoke with patient  Son Ramos, informed that Dr. Abril Arredondo has placed an order for home health physical therapy and that someone will be reaching out to schedule for services.   Gurinder Mariscal LPN

## 2022-05-21 ENCOUNTER — HOME CARE VISIT (OUTPATIENT)
Dept: HOME HEALTH SERVICES | Facility: HOME HEALTH | Age: 87
End: 2022-05-21

## 2022-05-21 ENCOUNTER — HOME CARE VISIT (OUTPATIENT)
Dept: SCHEDULING | Facility: HOME HEALTH | Age: 87
End: 2022-05-21

## 2022-05-21 PROCEDURE — G0151 HHCP-SERV OF PT,EA 15 MIN: HCPCS

## 2022-08-19 ENCOUNTER — OFFICE VISIT (OUTPATIENT)
Dept: NEUROLOGY | Age: 87
End: 2022-08-19
Payer: MEDICARE

## 2022-08-19 VITALS
DIASTOLIC BLOOD PRESSURE: 72 MMHG | BODY MASS INDEX: 27.12 KG/M2 | SYSTOLIC BLOOD PRESSURE: 110 MMHG | WEIGHT: 168 LBS | RESPIRATION RATE: 18 BRPM | OXYGEN SATURATION: 97 % | HEART RATE: 83 BPM

## 2022-08-19 DIAGNOSIS — G30.9 MIXED DEMENTIA (HCC): Primary | ICD-10-CM

## 2022-08-19 DIAGNOSIS — F02.80 MIXED DEMENTIA (HCC): Primary | ICD-10-CM

## 2022-08-19 DIAGNOSIS — F01.50 MIXED DEMENTIA (HCC): Primary | ICD-10-CM

## 2022-08-19 PROCEDURE — G8427 DOCREV CUR MEDS BY ELIG CLIN: HCPCS | Performed by: PSYCHIATRY & NEUROLOGY

## 2022-08-19 PROCEDURE — G8417 CALC BMI ABV UP PARAM F/U: HCPCS | Performed by: PSYCHIATRY & NEUROLOGY

## 2022-08-19 PROCEDURE — 1090F PRES/ABSN URINE INCON ASSESS: CPT | Performed by: PSYCHIATRY & NEUROLOGY

## 2022-08-19 PROCEDURE — 1101F PT FALLS ASSESS-DOCD LE1/YR: CPT | Performed by: PSYCHIATRY & NEUROLOGY

## 2022-08-19 PROCEDURE — G8510 SCR DEP NEG, NO PLAN REQD: HCPCS | Performed by: PSYCHIATRY & NEUROLOGY

## 2022-08-19 PROCEDURE — 99215 OFFICE O/P EST HI 40 MIN: CPT | Performed by: PSYCHIATRY & NEUROLOGY

## 2022-08-19 PROCEDURE — G8536 NO DOC ELDER MAL SCRN: HCPCS | Performed by: PSYCHIATRY & NEUROLOGY

## 2022-08-19 PROCEDURE — 1123F ACP DISCUSS/DSCN MKR DOCD: CPT | Performed by: PSYCHIATRY & NEUROLOGY

## 2022-08-19 RX ORDER — METRONIDAZOLE 7.5 MG/G
CREAM TOPICAL
COMMUNITY
Start: 2022-08-15

## 2022-08-19 RX ORDER — RIVASTIGMINE 4.6 MG/24H
1 PATCH, EXTENDED RELEASE TRANSDERMAL DAILY
Qty: 30 PATCH | Refills: 1 | Status: SHIPPED | OUTPATIENT
Start: 2022-08-19

## 2022-08-19 NOTE — PROGRESS NOTES
Chief Complaint   Patient presents with    Follow-up     Memory continues to decline, mobility has declined. HISTORY OF PRESENT ILLNESS  Jordan Hollis came back for follow-up. She was last seen over 2 years ago. She came along with her daughter and son-in-law. Her  passed away recently and he was a primary caregiver. She continues to live in her own home. Daughter has cameras installed in her house and they keep strict I. They are there with her in the evening, help her with the dinner and then she eventually goes to bed by herself. She is able to take care of basic activities of daily living including shower, personal hygiene, changing clothes etc.  Does not handle money or finances anymore. Does not drive. There are no behavioral or sleep issues. Oral intake is good. She has moderate dementia of mixed type. She cannot tolerate donepezil as it made her dizzy and drowsy. Does not like taking medications in general.  Does not do much physical or mental activity but will look at newspapers. Family does not think that she can follow what she is reading. She will try to answer questions open endedly and will repeat herself. Current Outpatient Medications   Medication Sig    metroNIDAZOLE (METROCREAM) 0.75 % topical cream APPLY TOPICALLY TO THE AFFECTED AREA TWICE DAILY    rivastigmine (Exelon Patch) 4.6 mg/24 hour patch 1 Patch by TransDERmal route daily. cholecalciferol (Vitamin D3) (2,000 UNITS /50 MCG) cap capsule TAKE 1 TABLET BY MOUTH EVERY DAY (Patient not taking: Reported on 8/19/2022)     No current facility-administered medications for this visit. PHYSICAL EXAMINATION:    Visit Vitals  /72   Pulse 83   Resp 18   Wt 168 lb (76.2 kg)   SpO2 97%   BMI 27.12 kg/m²       NEUROLOGICAL EXAMINATION:     Mental Status:   Alert and oriented to person, place. She could not tell me today's date. She scored 10/30 on Sameer cognitive assessment.   She has difficulties with visuospatial function, naming, language fluency and word recall. Short-term recall was significantly impaired at 0/5. Cranial Nerves:    II, III, IV, VI:  Visual acuity grossly intact. Visual fields are normal.    Pupils are equal, round, and reactive to light and accommodation. Extra-ocular movements are full and fluid. V-XII: Hearing is grossly intact. Facial features are symmetric, with normal sensation and strength. The palate rises symmetrically and the tongue protrudes midline. Sternocleidomastoids 5/5. Motor Examination: Normal tone, bulk, and strength. 5/5 muscle strength throughout. No cogwheel rigidity or clonus present. Sensory exam: Intact to light touch       Coordination: No resting or intention tremor    Gait and Station: Sitting on a wheelchair. He is able to ambulate much assistance. No muscle wasting or fasiculations noted. Reflexes:  DTRs 2+ throughout. Toes downgoing. LABS / IMAGING  CT Results (most recent):  Results from Hospital Encounter encounter on 08/20/20    CT HEAD WO CONT    Narrative  EXAM:  CT HEAD WO CONT    INDICATION: Alzheimer's disease. COMPARISON: None. TECHNIQUE: Axial noncontrast head CT from foramen magnum to vertex. Coronal and  sagittal reformatted images were obtained. CT dose reduction was achieved  through use of a standardized protocol tailored for this examination and  automatic exposure control for dose modulation. Adaptive statistical iterative  reconstruction (ASIR) was utilized. FINDINGS:  There is diffuse age-related parenchymal volume loss. The ventricles  and sulci are age-appropriate without hydrocephalus. There is no mass effect or  midline shift. There is no intracranial hemorrhage or extra-axial fluid  collection. Areas of low attenuation in the periventricular white matter most  likely represent age-indeterminate microvascular ischemic changes. The  gray-white matter differentiation is maintained.  The basal cisterns are patent. The osseous structures are intact. The visualized paranasal sinuses and mastoid  air cells are clear. Impression  IMPRESSION:  1. No acute intracranial hemorrhage. 2. Age-indeterminate moderate microvascular ischemic disease in the  periventricular white matter. CT imaging reviewed    Lab Results   Component Value Date/Time    Vitamin B12 438 08/17/2020 10:19 AM    Folate 6.2 08/17/2020 10:19 AM     Lab Results   Component Value Date/Time    TSH 2.14 10/12/2021 09:33 AM       ASSESSMENT    ICD-10-CM ICD-9-CM    1. Mixed dementia ([facility] [address]  G30.9 331.0 rivastigmine (Exelon Patch) 4.6 mg/24 hour patch    F01.50 294.10     F02.80 290.40           DISCUSSION  Ms. Bales Mis moderate to severe dementia on her cognitive screening. 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 CT brain showed moderate small vessel ischemic changes and other screening labs were negative  Her cognitive score has declined over the past 2 years  She was tried on donepezil but could not tolerate it  We will try her on Exelon/rivastigmine patch  Try to stay physically, mentally and socially active as possible  Her  passed away recently but has a very supportive daughter and son-in-law.   Currently living by herself but they supervise everything including her meals, finances etc. and she does not drive   Her needs may increase further over time and we may need to consider an alternative living situation depending upon how she does  Follow-up annually or earlier if needed    Time 40 minutes    Deonna Hunter MD  Diplomate, American Board of Psychiatry & Neurology (Neurology)  Diplomate, American Board of Psychiatry & Neurology (Clinical Neurophysiology)  Diplomate, American Board of Electrodiagnostic Medicine

## 2023-01-01 ENCOUNTER — APPOINTMENT (OUTPATIENT)
Facility: HOSPITAL | Age: 88
DRG: 064 | End: 2023-01-01
Payer: MEDICARE

## 2023-01-01 ENCOUNTER — HOSPICE ADMISSION (OUTPATIENT)
Age: 88
End: 2023-01-01
Payer: MEDICARE

## 2023-01-01 ENCOUNTER — HOSPITAL ENCOUNTER (INPATIENT)
Facility: HOSPITAL | Age: 88
LOS: 11 days | Discharge: HOSPICE | DRG: 064 | End: 2024-01-02
Attending: STUDENT IN AN ORGANIZED HEALTH CARE EDUCATION/TRAINING PROGRAM | Admitting: STUDENT IN AN ORGANIZED HEALTH CARE EDUCATION/TRAINING PROGRAM
Payer: MEDICARE

## 2023-01-01 DIAGNOSIS — R29.898 WEAKNESS OF LEFT LEG: ICD-10-CM

## 2023-01-01 DIAGNOSIS — Z51.5 HOSPICE CARE: ICD-10-CM

## 2023-01-01 DIAGNOSIS — I63.9 ACUTE CVA (CEREBROVASCULAR ACCIDENT) (HCC): ICD-10-CM

## 2023-01-01 DIAGNOSIS — R29.898 WEAKNESS OF LEFT ARM: Primary | ICD-10-CM

## 2023-01-01 LAB
ALBUMIN SERPL-MCNC: 2.2 G/DL (ref 3.5–5)
ALBUMIN SERPL-MCNC: 2.4 G/DL (ref 3.5–5)
ALBUMIN SERPL-MCNC: 2.8 G/DL (ref 3.5–5)
ALBUMIN/GLOB SERPL: 0.5 (ref 1.1–2.2)
ALBUMIN/GLOB SERPL: 0.6 (ref 1.1–2.2)
ALBUMIN/GLOB SERPL: 0.7 (ref 1.1–2.2)
ALP SERPL-CCNC: 76 U/L (ref 45–117)
ALP SERPL-CCNC: 85 U/L (ref 45–117)
ALP SERPL-CCNC: 98 U/L (ref 45–117)
ALT SERPL-CCNC: 13 U/L (ref 12–78)
ALT SERPL-CCNC: 15 U/L (ref 12–78)
ALT SERPL-CCNC: 16 U/L (ref 12–78)
ANION GAP SERPL CALC-SCNC: 10 MMOL/L (ref 5–15)
ANION GAP SERPL CALC-SCNC: 6 MMOL/L (ref 5–15)
ANION GAP SERPL CALC-SCNC: 8 MMOL/L (ref 5–15)
APPEARANCE UR: CLEAR
AST SERPL-CCNC: 15 U/L (ref 15–37)
AST SERPL-CCNC: 40 U/L (ref 15–37)
AST SERPL-CCNC: 9 U/L (ref 15–37)
BACTERIA SPEC CULT: NORMAL
BACTERIA URNS QL MICRO: ABNORMAL /HPF
BASOPHILS # BLD: 0 K/UL (ref 0–0.1)
BASOPHILS NFR BLD: 0 % (ref 0–1)
BILIRUB SERPL-MCNC: 0.9 MG/DL (ref 0.2–1)
BILIRUB SERPL-MCNC: 1.3 MG/DL (ref 0.2–1)
BILIRUB SERPL-MCNC: 1.8 MG/DL (ref 0.2–1)
BILIRUB UR QL: NEGATIVE
BUN SERPL-MCNC: 12 MG/DL (ref 6–20)
BUN SERPL-MCNC: 16 MG/DL (ref 6–20)
BUN SERPL-MCNC: 27 MG/DL (ref 6–20)
BUN/CREAT SERPL: 25 (ref 12–20)
BUN/CREAT SERPL: 27 (ref 12–20)
BUN/CREAT SERPL: 33 (ref 12–20)
CALCIUM SERPL-MCNC: 9 MG/DL (ref 8.5–10.1)
CALCIUM SERPL-MCNC: 9.1 MG/DL (ref 8.5–10.1)
CALCIUM SERPL-MCNC: 9.2 MG/DL (ref 8.5–10.1)
CHLORIDE SERPL-SCNC: 105 MMOL/L (ref 97–108)
CHLORIDE SERPL-SCNC: 106 MMOL/L (ref 97–108)
CHLORIDE SERPL-SCNC: 108 MMOL/L (ref 97–108)
CHOLEST SERPL-MCNC: 241 MG/DL
CO2 SERPL-SCNC: 22 MMOL/L (ref 21–32)
CO2 SERPL-SCNC: 23 MMOL/L (ref 21–32)
CO2 SERPL-SCNC: 23 MMOL/L (ref 21–32)
COLOR UR: ABNORMAL
COMMENT:: NORMAL
CREAT SERPL-MCNC: 0.44 MG/DL (ref 0.55–1.02)
CREAT SERPL-MCNC: 0.64 MG/DL (ref 0.55–1.02)
CREAT SERPL-MCNC: 0.83 MG/DL (ref 0.55–1.02)
DIFFERENTIAL METHOD BLD: ABNORMAL
ECHO AO ASC DIAM: 2.8 CM
ECHO AO ASCENDING AORTA INDEX: 1.62 CM/M2
ECHO AO ROOT DIAM: 3.1 CM
ECHO AO ROOT INDEX: 1.79 CM/M2
ECHO BSA: 1.74 M2
ECHO BSA: 1.74 M2
ECHO LA DIAMETER INDEX: 1.97 CM/M2
ECHO LA DIAMETER: 3.4 CM
ECHO LA TO AORTIC ROOT RATIO: 1.1
ECHO LA VOL A-L A2C: 63 ML (ref 22–52)
ECHO LA VOL MOD A2C: 59 ML (ref 22–52)
ECHO LA VOLUME INDEX A-L A2C: 36 ML/M2 (ref 16–34)
ECHO LA VOLUME INDEX MOD A2C: 34 ML/M2 (ref 16–34)
ECHO LV FRACTIONAL SHORTENING: 42 % (ref 28–44)
ECHO LV INTERNAL DIMENSION DIASTOLE INDEX: 2.6 CM/M2
ECHO LV INTERNAL DIMENSION DIASTOLIC: 4.5 CM (ref 3.9–5.3)
ECHO LV INTERNAL DIMENSION SYSTOLIC INDEX: 1.5 CM/M2
ECHO LV INTERNAL DIMENSION SYSTOLIC: 2.6 CM
ECHO LV IVSD: 0.7 CM (ref 0.6–0.9)
ECHO LV MASS 2D: 104.5 G (ref 67–162)
ECHO LV MASS INDEX 2D: 60.4 G/M2 (ref 43–95)
ECHO LV POSTERIOR WALL DIASTOLIC: 0.8 CM (ref 0.6–0.9)
ECHO LV RELATIVE WALL THICKNESS RATIO: 0.36
ECHO LVOT AREA: 2.8 CM2
ECHO LVOT DIAM: 1.9 CM
ECHO PV MAX VELOCITY: 0.6 M/S
ECHO PV PEAK GRADIENT: 1 MMHG
ECHO RV FREE WALL PEAK S': 11 CM/S
ECHO RV INTERNAL DIMENSION: 2.7 CM
ECHO RV LONGITUDINAL DIMENSION: 5.9 CM
ECHO RV MID DIMENSION: 2.2 CM
ECHO RV TAPSE: 2 CM (ref 1.7–?)
ECHO TV REGURGITANT MAX VELOCITY: 2.63 M/S
ECHO TV REGURGITANT PEAK GRADIENT: 28 MMHG
EKG ATRIAL RATE: 88 BPM
EKG DIAGNOSIS: NORMAL
EKG P AXIS: -3 DEGREES
EKG P-R INTERVAL: 138 MS
EKG Q-T INTERVAL: 344 MS
EKG QRS DURATION: 88 MS
EKG QTC CALCULATION (BAZETT): 416 MS
EKG R AXIS: 54 DEGREES
EKG T AXIS: 256 DEGREES
EKG VENTRICULAR RATE: 88 BPM
EOSINOPHIL # BLD: 0 K/UL (ref 0–0.4)
EOSINOPHIL NFR BLD: 0 % (ref 0–7)
EPITH CASTS URNS QL MICRO: ABNORMAL /LPF
ERYTHROCYTE [DISTWIDTH] IN BLOOD BY AUTOMATED COUNT: 13.5 % (ref 11.5–14.5)
ERYTHROCYTE [DISTWIDTH] IN BLOOD BY AUTOMATED COUNT: 14.3 % (ref 11.5–14.5)
ERYTHROCYTE [DISTWIDTH] IN BLOOD BY AUTOMATED COUNT: 14.4 % (ref 11.5–14.5)
FLUAV AG NPH QL IA: NEGATIVE
FLUBV AG NOSE QL IA: NEGATIVE
FOLATE SERPL-MCNC: 4.8 NG/ML (ref 5–21)
GLOBULIN SER CALC-MCNC: 3.3 G/DL (ref 2–4)
GLOBULIN SER CALC-MCNC: 4.6 G/DL (ref 2–4)
GLOBULIN SER CALC-MCNC: 5 G/DL (ref 2–4)
GLUCOSE BLD STRIP.AUTO-MCNC: 140 MG/DL (ref 65–117)
GLUCOSE SERPL-MCNC: 115 MG/DL (ref 65–100)
GLUCOSE SERPL-MCNC: 116 MG/DL (ref 65–100)
GLUCOSE SERPL-MCNC: 126 MG/DL (ref 65–100)
GLUCOSE UR STRIP.AUTO-MCNC: NEGATIVE MG/DL
HCT VFR BLD AUTO: 35.8 % (ref 35–47)
HCT VFR BLD AUTO: 38.3 % (ref 35–47)
HCT VFR BLD AUTO: 40.2 % (ref 35–47)
HDLC SERPL-MCNC: 49 MG/DL
HDLC SERPL: 4.9 (ref 0–5)
HGB BLD-MCNC: 11.7 G/DL (ref 11.5–16)
HGB BLD-MCNC: 13 G/DL (ref 11.5–16)
HGB BLD-MCNC: 13.5 G/DL (ref 11.5–16)
HGB UR QL STRIP: NEGATIVE
IMM GRANULOCYTES # BLD AUTO: 0 K/UL (ref 0–0.04)
IMM GRANULOCYTES NFR BLD AUTO: 0 % (ref 0–0.5)
KETONES UR QL STRIP.AUTO: 15 MG/DL
LDLC SERPL CALC-MCNC: 173.6 MG/DL (ref 0–100)
LEUKOCYTE ESTERASE UR QL STRIP.AUTO: NEGATIVE
LYMPHOCYTES # BLD: 0.6 K/UL (ref 0.8–3.5)
LYMPHOCYTES NFR BLD: 6 % (ref 12–49)
MAGNESIUM SERPL-MCNC: 1.9 MG/DL (ref 1.6–2.4)
MAGNESIUM SERPL-MCNC: 2.1 MG/DL (ref 1.6–2.4)
MAGNESIUM SERPL-MCNC: 2.4 MG/DL (ref 1.6–2.4)
MAGNESIUM SERPL-MCNC: 2.7 MG/DL (ref 1.6–2.4)
MCH RBC QN AUTO: 30.8 PG (ref 26–34)
MCH RBC QN AUTO: 30.9 PG (ref 26–34)
MCH RBC QN AUTO: 31.5 PG (ref 26–34)
MCHC RBC AUTO-ENTMCNC: 32.7 G/DL (ref 30–36.5)
MCHC RBC AUTO-ENTMCNC: 33.6 G/DL (ref 30–36.5)
MCHC RBC AUTO-ENTMCNC: 33.9 G/DL (ref 30–36.5)
MCV RBC AUTO: 91 FL (ref 80–99)
MCV RBC AUTO: 91.6 FL (ref 80–99)
MCV RBC AUTO: 96.2 FL (ref 80–99)
MONOCYTES # BLD: 0.6 K/UL (ref 0–1)
MONOCYTES NFR BLD: 6 % (ref 5–13)
NEUTS SEG # BLD: 8.4 K/UL (ref 1.8–8)
NEUTS SEG NFR BLD: 88 % (ref 32–75)
NITRITE UR QL STRIP.AUTO: NEGATIVE
NRBC # BLD: 0 K/UL (ref 0–0.01)
NRBC BLD-RTO: 0 PER 100 WBC
PH UR STRIP: 5 (ref 5–8)
PHOSPHATE SERPL-MCNC: 2.8 MG/DL (ref 2.6–4.7)
PLATELET # BLD AUTO: 237 K/UL (ref 150–400)
PLATELET # BLD AUTO: 252 K/UL (ref 150–400)
PLATELET # BLD AUTO: 257 K/UL (ref 150–400)
PMV BLD AUTO: 10.4 FL (ref 8.9–12.9)
PMV BLD AUTO: 11 FL (ref 8.9–12.9)
PMV BLD AUTO: 11.2 FL (ref 8.9–12.9)
POTASSIUM SERPL-SCNC: 3.1 MMOL/L (ref 3.5–5.1)
POTASSIUM SERPL-SCNC: 3.4 MMOL/L (ref 3.5–5.1)
POTASSIUM SERPL-SCNC: 5.3 MMOL/L (ref 3.5–5.1)
PROT SERPL-MCNC: 5.7 G/DL (ref 6.4–8.2)
PROT SERPL-MCNC: 6.8 G/DL (ref 6.4–8.2)
PROT SERPL-MCNC: 7.8 G/DL (ref 6.4–8.2)
PROT UR STRIP-MCNC: ABNORMAL MG/DL
RBC # BLD AUTO: 3.72 M/UL (ref 3.8–5.2)
RBC # BLD AUTO: 4.21 M/UL (ref 3.8–5.2)
RBC # BLD AUTO: 4.39 M/UL (ref 3.8–5.2)
RBC #/AREA URNS HPF: ABNORMAL /HPF (ref 0–5)
RBC MORPH BLD: ABNORMAL
SARS-COV-2 RDRP RESP QL NAA+PROBE: NOT DETECTED
SERVICE CMNT-IMP: ABNORMAL
SERVICE CMNT-IMP: NORMAL
SODIUM SERPL-SCNC: 134 MMOL/L (ref 136–145)
SODIUM SERPL-SCNC: 138 MMOL/L (ref 136–145)
SODIUM SERPL-SCNC: 139 MMOL/L (ref 136–145)
SOURCE: NORMAL
SP GR UR REFRACTOMETRY: 1.02 (ref 1–1.03)
SPECIMEN HOLD: NORMAL
TRIGL SERPL-MCNC: 92 MG/DL
TSH SERPL DL<=0.05 MIU/L-ACNC: 0.97 UIU/ML (ref 0.36–3.74)
URINE CULTURE IF INDICATED: ABNORMAL
UROBILINOGEN UR QL STRIP.AUTO: 0.2 EU/DL (ref 0.2–1)
VAS LEFT CCA DIST EDV: 9.2 CM/S
VAS LEFT CCA DIST PSV: 49.4 CM/S
VAS LEFT CCA PROX EDV: 11.9 CM/S
VAS LEFT CCA PROX PSV: 72.9 CM/S
VAS LEFT ECA EDV: 0 CM/S
VAS LEFT ECA PSV: 43.3 CM/S
VAS LEFT ICA DIST EDV: 8.8 CM/S
VAS LEFT ICA DIST PSV: 29.1 CM/S
VAS LEFT ICA MID EDV: 10.3 CM/S
VAS LEFT ICA MID PSV: 37.5 CM/S
VAS LEFT ICA PROX EDV: 12.7 CM/S
VAS LEFT ICA PROX PSV: 42.4 CM/S
VAS LEFT ICA/CCA PSV: 0.9 NO UNITS
VAS LEFT VERTEBRAL EDV: 5.3 CM/S
VAS LEFT VERTEBRAL PSV: 33.1 CM/S
VAS RIGHT CCA DIST EDV: 6.2 CM/S
VAS RIGHT CCA DIST PSV: 57.2 CM/S
VAS RIGHT CCA PROX EDV: 8.1 CM/S
VAS RIGHT CCA PROX PSV: 66.7 CM/S
VAS RIGHT ECA EDV: 0 CM/S
VAS RIGHT ECA PSV: 45.9 CM/S
VAS RIGHT ICA DIST EDV: 9.9 CM/S
VAS RIGHT ICA DIST PSV: 43.9 CM/S
VAS RIGHT ICA MID EDV: 10.6 CM/S
VAS RIGHT ICA MID PSV: 50.4 CM/S
VAS RIGHT ICA PROX EDV: 8.4 CM/S
VAS RIGHT ICA PROX PSV: 44.1 CM/S
VAS RIGHT ICA/CCA PSV: 0.9 NO UNITS
VAS RIGHT VERTEBRAL EDV: 4.7 CM/S
VAS RIGHT VERTEBRAL PSV: 40.6 CM/S
VIT B12 SERPL-MCNC: 571 PG/ML (ref 193–986)
VLDLC SERPL CALC-MCNC: 18.4 MG/DL
WBC # BLD AUTO: 6.3 K/UL (ref 3.6–11)
WBC # BLD AUTO: 6.8 K/UL (ref 3.6–11)
WBC # BLD AUTO: 9.6 K/UL (ref 3.6–11)
WBC URNS QL MICRO: ABNORMAL /HPF (ref 0–4)

## 2023-01-01 PROCEDURE — 1100000000 HC RM PRIVATE

## 2023-01-01 PROCEDURE — 85027 COMPLETE CBC AUTOMATED: CPT

## 2023-01-01 PROCEDURE — 70450 CT HEAD/BRAIN W/O DYE: CPT

## 2023-01-01 PROCEDURE — 2580000003 HC RX 258: Performed by: STUDENT IN AN ORGANIZED HEALTH CARE EDUCATION/TRAINING PROGRAM

## 2023-01-01 PROCEDURE — 97535 SELF CARE MNGMENT TRAINING: CPT | Performed by: OCCUPATIONAL THERAPIST

## 2023-01-01 PROCEDURE — 6360000002 HC RX W HCPCS: Performed by: HOSPITALIST

## 2023-01-01 PROCEDURE — 97535 SELF CARE MNGMENT TRAINING: CPT

## 2023-01-01 PROCEDURE — 73560 X-RAY EXAM OF KNEE 1 OR 2: CPT

## 2023-01-01 PROCEDURE — 95819 EEG AWAKE AND ASLEEP: CPT

## 2023-01-01 PROCEDURE — 83735 ASSAY OF MAGNESIUM: CPT

## 2023-01-01 PROCEDURE — 36415 COLL VENOUS BLD VENIPUNCTURE: CPT

## 2023-01-01 PROCEDURE — 6370000000 HC RX 637 (ALT 250 FOR IP): Performed by: HOSPITALIST

## 2023-01-01 PROCEDURE — 82746 ASSAY OF FOLIC ACID SERUM: CPT

## 2023-01-01 PROCEDURE — 99285 EMERGENCY DEPT VISIT HI MDM: CPT

## 2023-01-01 PROCEDURE — 6370000000 HC RX 637 (ALT 250 FOR IP): Performed by: NURSE PRACTITIONER

## 2023-01-01 PROCEDURE — 80053 COMPREHEN METABOLIC PANEL: CPT

## 2023-01-01 PROCEDURE — 6360000002 HC RX W HCPCS: Performed by: STUDENT IN AN ORGANIZED HEALTH CARE EDUCATION/TRAINING PROGRAM

## 2023-01-01 PROCEDURE — 6360000002 HC RX W HCPCS: Performed by: FAMILY MEDICINE

## 2023-01-01 PROCEDURE — 71045 X-RAY EXAM CHEST 1 VIEW: CPT

## 2023-01-01 PROCEDURE — 73502 X-RAY EXAM HIP UNI 2-3 VIEWS: CPT

## 2023-01-01 PROCEDURE — 51798 US URINE CAPACITY MEASURE: CPT

## 2023-01-01 PROCEDURE — 92526 ORAL FUNCTION THERAPY: CPT

## 2023-01-01 PROCEDURE — 2580000003 HC RX 258: Performed by: NURSE PRACTITIONER

## 2023-01-01 PROCEDURE — 99222 1ST HOSP IP/OBS MODERATE 55: CPT | Performed by: NURSE PRACTITIONER

## 2023-01-01 PROCEDURE — 96375 TX/PRO/DX INJ NEW DRUG ADDON: CPT

## 2023-01-01 PROCEDURE — 97165 OT EVAL LOW COMPLEX 30 MIN: CPT | Performed by: OCCUPATIONAL THERAPIST

## 2023-01-01 PROCEDURE — 93880 EXTRACRANIAL BILAT STUDY: CPT

## 2023-01-01 PROCEDURE — 97530 THERAPEUTIC ACTIVITIES: CPT

## 2023-01-01 PROCEDURE — 51701 INSERT BLADDER CATHETER: CPT

## 2023-01-01 PROCEDURE — 99232 SBSQ HOSP IP/OBS MODERATE 35: CPT | Performed by: NURSE PRACTITIONER

## 2023-01-01 PROCEDURE — 84100 ASSAY OF PHOSPHORUS: CPT

## 2023-01-01 PROCEDURE — 87804 INFLUENZA ASSAY W/OPTIC: CPT

## 2023-01-01 PROCEDURE — 70551 MRI BRAIN STEM W/O DYE: CPT

## 2023-01-01 PROCEDURE — 80061 LIPID PANEL: CPT

## 2023-01-01 PROCEDURE — 93306 TTE W/DOPPLER COMPLETE: CPT

## 2023-01-01 PROCEDURE — 82962 GLUCOSE BLOOD TEST: CPT

## 2023-01-01 PROCEDURE — 97161 PT EVAL LOW COMPLEX 20 MIN: CPT

## 2023-01-01 PROCEDURE — 96374 THER/PROPH/DIAG INJ IV PUSH: CPT

## 2023-01-01 PROCEDURE — 96361 HYDRATE IV INFUSION ADD-ON: CPT

## 2023-01-01 PROCEDURE — 87086 URINE CULTURE/COLONY COUNT: CPT

## 2023-01-01 PROCEDURE — 92610 EVALUATE SWALLOWING FUNCTION: CPT

## 2023-01-01 PROCEDURE — 81001 URINALYSIS AUTO W/SCOPE: CPT

## 2023-01-01 PROCEDURE — 99231 SBSQ HOSP IP/OBS SF/LOW 25: CPT | Performed by: NURSE PRACTITIONER

## 2023-01-01 PROCEDURE — 97112 NEUROMUSCULAR REEDUCATION: CPT

## 2023-01-01 PROCEDURE — G0378 HOSPITAL OBSERVATION PER HR: HCPCS

## 2023-01-01 PROCEDURE — 84443 ASSAY THYROID STIM HORMONE: CPT

## 2023-01-01 PROCEDURE — 93005 ELECTROCARDIOGRAM TRACING: CPT | Performed by: STUDENT IN AN ORGANIZED HEALTH CARE EDUCATION/TRAINING PROGRAM

## 2023-01-01 PROCEDURE — 82607 VITAMIN B-12: CPT

## 2023-01-01 PROCEDURE — 93306 TTE W/DOPPLER COMPLETE: CPT | Performed by: SPECIALIST

## 2023-01-01 PROCEDURE — 95816 EEG AWAKE AND DROWSY: CPT | Performed by: PSYCHIATRY & NEUROLOGY

## 2023-01-01 PROCEDURE — 6370000000 HC RX 637 (ALT 250 FOR IP): Performed by: STUDENT IN AN ORGANIZED HEALTH CARE EDUCATION/TRAINING PROGRAM

## 2023-01-01 PROCEDURE — 93010 ELECTROCARDIOGRAM REPORT: CPT | Performed by: SPECIALIST

## 2023-01-01 PROCEDURE — 85025 COMPLETE CBC W/AUTO DIFF WBC: CPT

## 2023-01-01 PROCEDURE — 87635 SARS-COV-2 COVID-19 AMP PRB: CPT

## 2023-01-01 RX ORDER — ONDANSETRON 2 MG/ML
4 INJECTION INTRAMUSCULAR; INTRAVENOUS EVERY 6 HOURS PRN
Status: DISCONTINUED | OUTPATIENT
Start: 2023-01-01 | End: 2024-01-01 | Stop reason: HOSPADM

## 2023-01-01 RX ORDER — RIVASTIGMINE 4.6 MG/24H
1 PATCH, EXTENDED RELEASE TRANSDERMAL DAILY
Status: DISCONTINUED | OUTPATIENT
Start: 2023-01-01 | End: 2023-01-01

## 2023-01-01 RX ORDER — ONDANSETRON 4 MG/1
4 TABLET, ORALLY DISINTEGRATING ORAL EVERY 8 HOURS PRN
Status: DISCONTINUED | OUTPATIENT
Start: 2023-01-01 | End: 2024-01-01 | Stop reason: HOSPADM

## 2023-01-01 RX ORDER — HYDROMORPHONE HYDROCHLORIDE 1 MG/ML
0.5 INJECTION, SOLUTION INTRAMUSCULAR; INTRAVENOUS; SUBCUTANEOUS
Status: DISCONTINUED | OUTPATIENT
Start: 2023-01-01 | End: 2024-01-01 | Stop reason: HOSPADM

## 2023-01-01 RX ORDER — ONDANSETRON 2 MG/ML
4 INJECTION INTRAMUSCULAR; INTRAVENOUS EVERY 6 HOURS PRN
Status: DISCONTINUED | OUTPATIENT
Start: 2023-01-01 | End: 2023-01-01

## 2023-01-01 RX ORDER — ACETAMINOPHEN 650 MG/1
650 SUPPOSITORY RECTAL EVERY 6 HOURS PRN
Status: DISCONTINUED | OUTPATIENT
Start: 2023-01-01 | End: 2024-01-01 | Stop reason: HOSPADM

## 2023-01-01 RX ORDER — KETOROLAC TROMETHAMINE 30 MG/ML
15 INJECTION, SOLUTION INTRAMUSCULAR; INTRAVENOUS ONCE
Status: COMPLETED | OUTPATIENT
Start: 2023-01-01 | End: 2023-01-01

## 2023-01-01 RX ORDER — ATORVASTATIN CALCIUM 40 MG/1
80 TABLET, FILM COATED ORAL NIGHTLY
Status: DISCONTINUED | OUTPATIENT
Start: 2023-01-01 | End: 2023-01-01

## 2023-01-01 RX ORDER — SODIUM CHLORIDE 9 MG/ML
INJECTION, SOLUTION INTRAVENOUS CONTINUOUS
Status: DISPENSED | OUTPATIENT
Start: 2023-01-01 | End: 2023-01-01

## 2023-01-01 RX ORDER — SODIUM CHLORIDE 0.9 % (FLUSH) 0.9 %
5-40 SYRINGE (ML) INJECTION EVERY 12 HOURS SCHEDULED
Status: DISCONTINUED | OUTPATIENT
Start: 2023-01-01 | End: 2024-01-01 | Stop reason: HOSPADM

## 2023-01-01 RX ORDER — ASPIRIN 81 MG/1
81 TABLET, CHEWABLE ORAL DAILY
Status: DISCONTINUED | OUTPATIENT
Start: 2023-01-01 | End: 2023-01-01

## 2023-01-01 RX ORDER — SODIUM CHLORIDE AND POTASSIUM CHLORIDE 300; 900 MG/100ML; MG/100ML
INJECTION, SOLUTION INTRAVENOUS CONTINUOUS
Status: DISCONTINUED | OUTPATIENT
Start: 2023-01-01 | End: 2023-01-01

## 2023-01-01 RX ORDER — POLYETHYLENE GLYCOL 3350 17 G/17G
17 POWDER, FOR SOLUTION ORAL DAILY PRN
Status: DISCONTINUED | OUTPATIENT
Start: 2023-01-01 | End: 2023-01-01

## 2023-01-01 RX ORDER — DIAZEPAM 5 MG/ML
5 INJECTION, SOLUTION INTRAMUSCULAR; INTRAVENOUS EVERY 4 HOURS PRN
Status: DISCONTINUED | OUTPATIENT
Start: 2023-01-01 | End: 2024-01-01 | Stop reason: HOSPADM

## 2023-01-01 RX ORDER — POTASSIUM CHLORIDE 7.45 MG/ML
10 INJECTION INTRAVENOUS PRN
Status: DISCONTINUED | OUTPATIENT
Start: 2023-01-01 | End: 2024-01-01 | Stop reason: HOSPADM

## 2023-01-01 RX ORDER — SODIUM CHLORIDE 0.9 % (FLUSH) 0.9 %
5-40 SYRINGE (ML) INJECTION PRN
Status: DISCONTINUED | OUTPATIENT
Start: 2023-01-01 | End: 2024-01-01 | Stop reason: HOSPADM

## 2023-01-01 RX ORDER — HYDROMORPHONE HYDROCHLORIDE 1 MG/ML
0.5 INJECTION, SOLUTION INTRAMUSCULAR; INTRAVENOUS; SUBCUTANEOUS EVERY 4 HOURS
Status: DISCONTINUED | OUTPATIENT
Start: 2023-01-01 | End: 2023-01-01

## 2023-01-01 RX ORDER — ATORVASTATIN CALCIUM 40 MG/1
40 TABLET, FILM COATED ORAL NIGHTLY
Status: DISCONTINUED | OUTPATIENT
Start: 2023-01-01 | End: 2023-01-01

## 2023-01-01 RX ORDER — 0.9 % SODIUM CHLORIDE 0.9 %
1000 INTRAVENOUS SOLUTION INTRAVENOUS ONCE
Status: COMPLETED | OUTPATIENT
Start: 2023-01-01 | End: 2023-01-01

## 2023-01-01 RX ORDER — POLYETHYLENE GLYCOL 3350 17 G/17G
17 POWDER, FOR SOLUTION ORAL DAILY PRN
Status: DISCONTINUED | OUTPATIENT
Start: 2023-01-01 | End: 2024-01-01 | Stop reason: HOSPADM

## 2023-01-01 RX ORDER — POTASSIUM CHLORIDE 750 MG/1
40 TABLET, FILM COATED, EXTENDED RELEASE ORAL ONCE
Status: COMPLETED | OUTPATIENT
Start: 2023-01-01 | End: 2023-01-01

## 2023-01-01 RX ORDER — SODIUM CHLORIDE 9 MG/ML
INJECTION, SOLUTION INTRAVENOUS PRN
Status: DISCONTINUED | OUTPATIENT
Start: 2023-01-01 | End: 2023-01-01

## 2023-01-01 RX ORDER — ONDANSETRON 4 MG/1
4 TABLET, ORALLY DISINTEGRATING ORAL EVERY 8 HOURS PRN
Status: DISCONTINUED | OUTPATIENT
Start: 2023-01-01 | End: 2023-01-01

## 2023-01-01 RX ORDER — LORAZEPAM 2 MG/ML
1 CONCENTRATE ORAL EVERY 8 HOURS PRN
Status: DISCONTINUED | OUTPATIENT
Start: 2023-01-01 | End: 2024-01-01 | Stop reason: HOSPADM

## 2023-01-01 RX ORDER — HYDROMORPHONE HYDROCHLORIDE 1 MG/ML
1 SOLUTION ORAL EVERY 4 HOURS
Status: DISCONTINUED | OUTPATIENT
Start: 2023-01-01 | End: 2024-01-01 | Stop reason: HOSPADM

## 2023-01-01 RX ORDER — MAGNESIUM SULFATE IN WATER 40 MG/ML
2000 INJECTION, SOLUTION INTRAVENOUS PRN
Status: DISCONTINUED | OUTPATIENT
Start: 2023-01-01 | End: 2024-01-01 | Stop reason: HOSPADM

## 2023-01-01 RX ORDER — POTASSIUM CHLORIDE 750 MG/1
40 TABLET, FILM COATED, EXTENDED RELEASE ORAL PRN
Status: DISCONTINUED | OUTPATIENT
Start: 2023-01-01 | End: 2024-01-01 | Stop reason: HOSPADM

## 2023-01-01 RX ORDER — MIRTAZAPINE 15 MG/1
15 TABLET, FILM COATED ORAL NIGHTLY
Status: DISCONTINUED | OUTPATIENT
Start: 2023-01-01 | End: 2023-01-01

## 2023-01-01 RX ORDER — ACETAMINOPHEN 325 MG/1
650 TABLET ORAL EVERY 6 HOURS PRN
Status: DISCONTINUED | OUTPATIENT
Start: 2023-01-01 | End: 2024-01-01 | Stop reason: HOSPADM

## 2023-01-01 RX ADMIN — Medication 1 MG: at 01:40

## 2023-01-01 RX ADMIN — SODIUM CHLORIDE, PRESERVATIVE FREE 10 ML: 5 INJECTION INTRAVENOUS at 10:03

## 2023-01-01 RX ADMIN — HYDROMORPHONE HYDROCHLORIDE 0.5 MG: 1 INJECTION, SOLUTION INTRAMUSCULAR; INTRAVENOUS; SUBCUTANEOUS at 10:03

## 2023-01-01 RX ADMIN — HYDROMORPHONE HYDROCHLORIDE 0.5 MG: 1 INJECTION, SOLUTION INTRAMUSCULAR; INTRAVENOUS; SUBCUTANEOUS at 21:28

## 2023-01-01 RX ADMIN — Medication 1 MG: at 21:34

## 2023-01-01 RX ADMIN — SODIUM CHLORIDE, PRESERVATIVE FREE 10 ML: 5 INJECTION INTRAVENOUS at 21:22

## 2023-01-01 RX ADMIN — HYDROMORPHONE HYDROCHLORIDE 0.5 MG: 1 INJECTION, SOLUTION INTRAMUSCULAR; INTRAVENOUS; SUBCUTANEOUS at 13:59

## 2023-01-01 RX ADMIN — HYDROMORPHONE HYDROCHLORIDE 0.5 MG: 1 INJECTION, SOLUTION INTRAMUSCULAR; INTRAVENOUS; SUBCUTANEOUS at 05:38

## 2023-01-01 RX ADMIN — SODIUM CHLORIDE, PRESERVATIVE FREE 10 ML: 5 INJECTION INTRAVENOUS at 10:01

## 2023-01-01 RX ADMIN — SODIUM CHLORIDE, PRESERVATIVE FREE 10 ML: 5 INJECTION INTRAVENOUS at 20:13

## 2023-01-01 RX ADMIN — Medication 1 MG: at 05:23

## 2023-01-01 RX ADMIN — SODIUM CHLORIDE, PRESERVATIVE FREE 10 ML: 5 INJECTION INTRAVENOUS at 09:04

## 2023-01-01 RX ADMIN — SODIUM CHLORIDE: 9 INJECTION, SOLUTION INTRAVENOUS at 18:37

## 2023-01-01 RX ADMIN — SODIUM CHLORIDE: 9 INJECTION, SOLUTION INTRAVENOUS at 10:44

## 2023-01-01 RX ADMIN — ATORVASTATIN CALCIUM 80 MG: 40 TABLET, FILM COATED ORAL at 20:52

## 2023-01-01 RX ADMIN — POTASSIUM CHLORIDE 40 MEQ: 750 TABLET, FILM COATED, EXTENDED RELEASE ORAL at 03:31

## 2023-01-01 RX ADMIN — SODIUM CHLORIDE, PRESERVATIVE FREE 10 ML: 5 INJECTION INTRAVENOUS at 23:38

## 2023-01-01 RX ADMIN — HYDROMORPHONE HYDROCHLORIDE 0.5 MG: 1 INJECTION, SOLUTION INTRAMUSCULAR; INTRAVENOUS; SUBCUTANEOUS at 22:24

## 2023-01-01 RX ADMIN — SODIUM CHLORIDE, PRESERVATIVE FREE 10 ML: 5 INJECTION INTRAVENOUS at 21:57

## 2023-01-01 RX ADMIN — SODIUM CHLORIDE, PRESERVATIVE FREE 10 ML: 5 INJECTION INTRAVENOUS at 21:34

## 2023-01-01 RX ADMIN — HYDROMORPHONE HYDROCHLORIDE 0.5 MG: 1 INJECTION, SOLUTION INTRAMUSCULAR; INTRAVENOUS; SUBCUTANEOUS at 01:19

## 2023-01-01 RX ADMIN — ASPIRIN 81 MG CHEWABLE TABLET 81 MG: 81 TABLET CHEWABLE at 10:01

## 2023-01-01 RX ADMIN — ATORVASTATIN CALCIUM 40 MG: 40 TABLET, FILM COATED ORAL at 20:28

## 2023-01-01 RX ADMIN — HYDROMORPHONE HYDROCHLORIDE 0.5 MG: 1 INJECTION, SOLUTION INTRAMUSCULAR; INTRAVENOUS; SUBCUTANEOUS at 17:02

## 2023-01-01 RX ADMIN — HYDROMORPHONE HYDROCHLORIDE 0.5 MG: 1 INJECTION, SOLUTION INTRAMUSCULAR; INTRAVENOUS; SUBCUTANEOUS at 09:45

## 2023-01-01 RX ADMIN — MIRTAZAPINE 15 MG: 15 TABLET, FILM COATED ORAL at 20:28

## 2023-01-01 RX ADMIN — SODIUM CHLORIDE, PRESERVATIVE FREE 10 ML: 5 INJECTION INTRAVENOUS at 09:45

## 2023-01-01 RX ADMIN — SODIUM CHLORIDE: 9 INJECTION, SOLUTION INTRAVENOUS at 10:40

## 2023-01-01 RX ADMIN — ATORVASTATIN CALCIUM 80 MG: 40 TABLET, FILM COATED ORAL at 21:51

## 2023-01-01 RX ADMIN — HYDROMORPHONE HYDROCHLORIDE 0.5 MG: 1 INJECTION, SOLUTION INTRAMUSCULAR; INTRAVENOUS; SUBCUTANEOUS at 00:46

## 2023-01-01 RX ADMIN — MIRTAZAPINE 15 MG: 15 TABLET, FILM COATED ORAL at 21:26

## 2023-01-01 RX ADMIN — ASPIRIN 81 MG CHEWABLE TABLET 81 MG: 81 TABLET CHEWABLE at 09:04

## 2023-01-01 RX ADMIN — SODIUM CHLORIDE, PRESERVATIVE FREE 10 ML: 5 INJECTION INTRAVENOUS at 21:50

## 2023-01-01 RX ADMIN — SODIUM CHLORIDE 1000 ML: 9 INJECTION, SOLUTION INTRAVENOUS at 20:15

## 2023-01-01 RX ADMIN — Medication 1 MG: at 17:12

## 2023-01-01 RX ADMIN — WATER 1000 MG: 1 INJECTION INTRAMUSCULAR; INTRAVENOUS; SUBCUTANEOUS at 00:16

## 2023-01-01 RX ADMIN — POTASSIUM CHLORIDE AND SODIUM CHLORIDE: 900; 300 INJECTION, SOLUTION INTRAVENOUS at 17:24

## 2023-01-01 RX ADMIN — HYDROMORPHONE HYDROCHLORIDE 0.5 MG: 1 INJECTION, SOLUTION INTRAMUSCULAR; INTRAVENOUS; SUBCUTANEOUS at 17:29

## 2023-01-01 RX ADMIN — SODIUM CHLORIDE, PRESERVATIVE FREE 10 ML: 5 INJECTION INTRAVENOUS at 09:43

## 2023-01-01 RX ADMIN — ATORVASTATIN CALCIUM 40 MG: 40 TABLET, FILM COATED ORAL at 21:26

## 2023-01-01 RX ADMIN — SODIUM CHLORIDE, PRESERVATIVE FREE 10 ML: 5 INJECTION INTRAVENOUS at 21:29

## 2023-01-01 RX ADMIN — SODIUM CHLORIDE, PRESERVATIVE FREE 10 ML: 5 INJECTION INTRAVENOUS at 22:25

## 2023-01-01 RX ADMIN — SODIUM CHLORIDE, PRESERVATIVE FREE 10 ML: 5 INJECTION INTRAVENOUS at 22:00

## 2023-01-01 RX ADMIN — MIRTAZAPINE 15 MG: 15 TABLET, FILM COATED ORAL at 21:50

## 2023-01-01 RX ADMIN — ATORVASTATIN CALCIUM 40 MG: 40 TABLET, FILM COATED ORAL at 21:57

## 2023-01-01 RX ADMIN — ASPIRIN 81 MG CHEWABLE TABLET 81 MG: 81 TABLET CHEWABLE at 10:44

## 2023-01-01 RX ADMIN — SODIUM CHLORIDE, PRESERVATIVE FREE 10 ML: 5 INJECTION INTRAVENOUS at 04:34

## 2023-01-01 RX ADMIN — WATER 1000 MG: 1 INJECTION INTRAMUSCULAR; INTRAVENOUS; SUBCUTANEOUS at 23:04

## 2023-01-01 RX ADMIN — ASPIRIN 81 MG CHEWABLE TABLET 81 MG: 81 TABLET CHEWABLE at 10:31

## 2023-01-01 RX ADMIN — ASPIRIN 81 MG CHEWABLE TABLET 81 MG: 81 TABLET CHEWABLE at 08:55

## 2023-01-01 RX ADMIN — SODIUM CHLORIDE, PRESERVATIVE FREE 10 ML: 5 INJECTION INTRAVENOUS at 21:51

## 2023-01-01 RX ADMIN — HYDROMORPHONE HYDROCHLORIDE 0.5 MG: 1 INJECTION, SOLUTION INTRAMUSCULAR; INTRAVENOUS; SUBCUTANEOUS at 13:18

## 2023-01-01 RX ADMIN — WATER 1000 MG: 1 INJECTION INTRAMUSCULAR; INTRAVENOUS; SUBCUTANEOUS at 23:38

## 2023-01-01 RX ADMIN — SODIUM CHLORIDE: 9 INJECTION, SOLUTION INTRAVENOUS at 23:38

## 2023-01-01 RX ADMIN — POTASSIUM CHLORIDE 40 MEQ: 750 TABLET, FILM COATED, EXTENDED RELEASE ORAL at 10:31

## 2023-01-01 RX ADMIN — Medication 1 MG: at 09:42

## 2023-01-01 RX ADMIN — KETOROLAC TROMETHAMINE 15 MG: 30 INJECTION, SOLUTION INTRAMUSCULAR at 21:31

## 2023-01-01 RX ADMIN — HYDROMORPHONE HYDROCHLORIDE 0.5 MG: 1 INJECTION, SOLUTION INTRAMUSCULAR; INTRAVENOUS; SUBCUTANEOUS at 13:33

## 2023-01-01 RX ADMIN — Medication 1 MG: at 18:29

## 2023-01-01 RX ADMIN — Medication 1 MG: at 20:53

## 2023-01-01 RX ADMIN — ATORVASTATIN CALCIUM 40 MG: 40 TABLET, FILM COATED ORAL at 22:49

## 2023-01-01 RX ADMIN — Medication 1 MG: at 13:48

## 2023-01-01 RX ADMIN — ASPIRIN 81 MG CHEWABLE TABLET 81 MG: 81 TABLET CHEWABLE at 10:05

## 2023-01-01 RX ADMIN — HYDROMORPHONE HYDROCHLORIDE 0.5 MG: 1 INJECTION, SOLUTION INTRAMUSCULAR; INTRAVENOUS; SUBCUTANEOUS at 04:34

## 2023-01-01 RX ADMIN — MIRTAZAPINE 15 MG: 15 TABLET, FILM COATED ORAL at 20:52

## 2023-01-01 RX ADMIN — SODIUM CHLORIDE, PRESERVATIVE FREE 10 ML: 5 INJECTION INTRAVENOUS at 00:47

## 2023-01-01 ASSESSMENT — PAIN SCALES - GENERAL
PAINLEVEL_OUTOF10: 0
PAINLEVEL_OUTOF10: 4
PAINLEVEL_OUTOF10: 0

## 2023-06-22 ENCOUNTER — OFFICE VISIT (OUTPATIENT)
Age: 88
End: 2023-06-22
Payer: MEDICARE

## 2023-06-22 VITALS
RESPIRATION RATE: 18 BRPM | HEART RATE: 61 BPM | WEIGHT: 151.2 LBS | SYSTOLIC BLOOD PRESSURE: 110 MMHG | HEIGHT: 66 IN | DIASTOLIC BLOOD PRESSURE: 69 MMHG | BODY MASS INDEX: 24.3 KG/M2 | TEMPERATURE: 98.2 F | OXYGEN SATURATION: 98 %

## 2023-06-22 DIAGNOSIS — R35.0 URINARY FREQUENCY: Primary | ICD-10-CM

## 2023-06-22 PROCEDURE — G8420 CALC BMI NORM PARAMETERS: HCPCS | Performed by: STUDENT IN AN ORGANIZED HEALTH CARE EDUCATION/TRAINING PROGRAM

## 2023-06-22 PROCEDURE — 99213 OFFICE O/P EST LOW 20 MIN: CPT | Performed by: STUDENT IN AN ORGANIZED HEALTH CARE EDUCATION/TRAINING PROGRAM

## 2023-06-22 PROCEDURE — 1036F TOBACCO NON-USER: CPT | Performed by: STUDENT IN AN ORGANIZED HEALTH CARE EDUCATION/TRAINING PROGRAM

## 2023-06-22 PROCEDURE — 1123F ACP DISCUSS/DSCN MKR DOCD: CPT | Performed by: STUDENT IN AN ORGANIZED HEALTH CARE EDUCATION/TRAINING PROGRAM

## 2023-06-22 PROCEDURE — G8427 DOCREV CUR MEDS BY ELIG CLIN: HCPCS | Performed by: STUDENT IN AN ORGANIZED HEALTH CARE EDUCATION/TRAINING PROGRAM

## 2023-06-22 PROCEDURE — 1090F PRES/ABSN URINE INCON ASSESS: CPT | Performed by: STUDENT IN AN ORGANIZED HEALTH CARE EDUCATION/TRAINING PROGRAM

## 2023-06-22 RX ORDER — NITROFURANTOIN 25; 75 MG/1; MG/1
100 CAPSULE ORAL 2 TIMES DAILY
Qty: 20 CAPSULE | Refills: 0 | Status: CANCELLED | OUTPATIENT
Start: 2023-06-22 | End: 2023-07-02

## 2023-06-22 SDOH — ECONOMIC STABILITY: FOOD INSECURITY: WITHIN THE PAST 12 MONTHS, YOU WORRIED THAT YOUR FOOD WOULD RUN OUT BEFORE YOU GOT MONEY TO BUY MORE.: NEVER TRUE

## 2023-06-22 SDOH — ECONOMIC STABILITY: HOUSING INSECURITY
IN THE LAST 12 MONTHS, WAS THERE A TIME WHEN YOU DID NOT HAVE A STEADY PLACE TO SLEEP OR SLEPT IN A SHELTER (INCLUDING NOW)?: NO

## 2023-06-22 SDOH — ECONOMIC STABILITY: INCOME INSECURITY: HOW HARD IS IT FOR YOU TO PAY FOR THE VERY BASICS LIKE FOOD, HOUSING, MEDICAL CARE, AND HEATING?: NOT HARD AT ALL

## 2023-06-22 SDOH — ECONOMIC STABILITY: FOOD INSECURITY: WITHIN THE PAST 12 MONTHS, THE FOOD YOU BOUGHT JUST DIDN'T LAST AND YOU DIDN'T HAVE MONEY TO GET MORE.: NEVER TRUE

## 2023-06-22 ASSESSMENT — PATIENT HEALTH QUESTIONNAIRE - PHQ9
SUM OF ALL RESPONSES TO PHQ QUESTIONS 1-9: 0
2. FEELING DOWN, DEPRESSED OR HOPELESS: 0
SUM OF ALL RESPONSES TO PHQ QUESTIONS 1-9: 0
SUM OF ALL RESPONSES TO PHQ9 QUESTIONS 1 & 2: 0
1. LITTLE INTEREST OR PLEASURE IN DOING THINGS: 0

## 2023-06-22 ASSESSMENT — ENCOUNTER SYMPTOMS: ABDOMINAL DISTENTION: 0

## 2023-06-22 NOTE — PROGRESS NOTES
Chief Complaint   Patient presents with    Urinary Frequency         1. \"Have you been to the ER, urgent care clinic since your last visit? Hospitalized since your last visit? \" yes - [address] 9/222 DX: UTI    2. \"Have you seen or consulted any other health care providers outside of the [address] [place] [address] since your last visit? \" no    3. For patients aged 39-70: Has the patient had a colonoscopy / FIT/ Cologuard? N/A      If the patient is female:    4. For patients aged 41-77: Has the patient had a mammogram within the past 2 years? no      5. For patients aged 21-65: Has the patient had a pap smear?  no    PHQ-9  6/22/2023   Little interest or pleasure in doing things 0   Little interest or pleasure in doing things -   Feeling down, depressed, or hopeless 0   PHQ-2 Score 0   Total Score PHQ 2 -   PHQ-9 Total Score 0           Financial Resource Strain: Low Risk     Difficulty of Paying Living Expenses: Not hard at all      Food Insecurity: No Food Insecurity    Worried About Running Out of Food in the Last Year: Never true    Ran Out of Food in the Last Year: Never true          Health Maintenance Due   Topic Date Due    Pneumococcal 65+ years Vaccine (2 - PPSV23 if available, else PCV20) 10/08/2016    Shingles vaccine (3 of 3) 06/24/2019    COVID-19 Vaccine (3 - Booster for Dillon Yuan series) 04/27/2021    Annual Wellness Visit (AWV)  10/13/2022

## 2023-06-22 NOTE — PROGRESS NOTES
Pelra Jennings  80 y.o. female  8/15/1934  [facility] [address]  [id number]     [address] [place] [address]       Chief Complaint: concern for UTI  Source: daughter who accompanies the patient     Subjective  Perla Jennings is an 80 y.o. female who presents for concern for UTI. Has been getting up every 2 hours or so going to bathroom. This started 2 days ago. She is not complaining about pain to family. Her daughter is concerned about a urinary tract infection. She has not been drinking much water and daughter has not noticed urine in the toilet when she has checked just toilet tissue. Patient denies urinary symptoms No prior positive Ucx on file. Review of Systems   Constitutional:  Negative for activity change, chills and fever. Gastrointestinal:  Negative for abdominal distention. Genitourinary:  Negative for decreased urine volume, difficulty urinating, dysuria, flank pain and hematuria. Allergies - reviewed: Allergies   Allergen Reactions    Morphine Nausea And Vomiting    Gentamicin Other (See Comments)    Penicillins Other (See Comments)     childhood    Tetracycline Other (See Comments)     Change of mental status    Codeine Nausea And Vomiting     GI upset    Hydrocodone Nausea And Vomiting    Iodine Rash     fever    Tramadol Nausea And Vomiting         Medications - reviewed:   Current Outpatient Medications   Medication Sig    Cholecalciferol 50 MCG (2000 UT) CAPS TAKE 1 TABLET BY MOUTH EVERY DAY (Patient not taking: Reported on 6/22/2023)    metroNIDAZOLE (METROCREAM) 0.75 % cream APPLY TOPICALLY TO THE AFFECTED AREA TWICE DAILY (Patient not taking: Reported on 6/22/2023)    rivastigmine (EXELON) 4.6 MG/24HR Place 1 patch onto the skin daily (Patient not taking: Reported on 6/22/2023)     No current facility-administered medications for this visit.          Past Medical History - reviewed:  Past Medical History:   Diagnosis Date    AR (allergic rhinitis)     GERD

## 2023-08-29 ENCOUNTER — OFFICE VISIT (OUTPATIENT)
Age: 88
End: 2023-08-29
Payer: MEDICARE

## 2023-08-29 VITALS
HEIGHT: 66 IN | BODY MASS INDEX: 24.4 KG/M2 | HEART RATE: 64 BPM | DIASTOLIC BLOOD PRESSURE: 78 MMHG | OXYGEN SATURATION: 97 % | SYSTOLIC BLOOD PRESSURE: 118 MMHG

## 2023-08-29 DIAGNOSIS — F02.C4 SEVERE LATE ONSET ALZHEIMER'S DEMENTIA WITH ANXIETY (HCC): Primary | ICD-10-CM

## 2023-08-29 DIAGNOSIS — G30.1 SEVERE LATE ONSET ALZHEIMER'S DEMENTIA WITH ANXIETY (HCC): Primary | ICD-10-CM

## 2023-08-29 PROCEDURE — 1036F TOBACCO NON-USER: CPT | Performed by: PSYCHIATRY & NEUROLOGY

## 2023-08-29 PROCEDURE — 1123F ACP DISCUSS/DSCN MKR DOCD: CPT | Performed by: PSYCHIATRY & NEUROLOGY

## 2023-08-29 PROCEDURE — G8420 CALC BMI NORM PARAMETERS: HCPCS | Performed by: PSYCHIATRY & NEUROLOGY

## 2023-08-29 PROCEDURE — 99215 OFFICE O/P EST HI 40 MIN: CPT | Performed by: PSYCHIATRY & NEUROLOGY

## 2023-08-29 PROCEDURE — 1090F PRES/ABSN URINE INCON ASSESS: CPT | Performed by: PSYCHIATRY & NEUROLOGY

## 2023-08-29 PROCEDURE — G8427 DOCREV CUR MEDS BY ELIG CLIN: HCPCS | Performed by: PSYCHIATRY & NEUROLOGY

## 2023-08-29 RX ORDER — ESCITALOPRAM OXALATE 10 MG/1
10 TABLET ORAL NIGHTLY
Qty: 30 TABLET | Refills: 3 | Status: SHIPPED | OUTPATIENT
Start: 2023-08-29

## 2023-08-29 RX ORDER — CLINDAMYCIN HYDROCHLORIDE 300 MG/1
CAPSULE ORAL
COMMUNITY
Start: 2023-08-28

## 2023-08-29 RX ORDER — RIVASTIGMINE 4.6 MG/24H
1 PATCH, EXTENDED RELEASE TRANSDERMAL DAILY
Qty: 30 PATCH | Refills: 3 | Status: SHIPPED | OUTPATIENT
Start: 2023-08-29

## 2023-08-29 NOTE — PROGRESS NOTES
Chief Complaint   Patient presents with    Follow-up     Alzheimer's disease, here with son today, who states, \" she is getting fatigued and appetite has been decreasing. \"     Vitals:    08/29/23 1448   BP: 118/78   Pulse: 64   SpO2: 97%

## 2023-08-29 NOTE — PROGRESS NOTES
Chief Complaint   Patient presents with    Follow-up     Alzheimer's disease, here with son today, who states, \" she is getting fatigued and appetite has been decreasing. \"       HISTORY OF PRESENT ILLNESS  Princess Guerrero came back for follow-up. She came along with her son-in-law. She continues to live alone in her own home. Daughter has cameras installed in her house and they keep strict eye  They are there with her in the evening, help her with the dinner and then she eventually goes to bed by herself. She is able to take care of basic activities of daily living including shower, personal hygiene, changing clothes etc.  Does not handle money or finances anymore. Does not drive. There are no behavioral or sleep issues. Oral intake fluctuates. Sometimes she appears anxious and fixates on certain things and may have an occasional hallucination. She was prescribed Exelon patch but does not appear to be on it anymore as she ran out of prescription. She has now cut back on drinking alcohol and this seems to have had a positive impact. She used to drink at least 2 -3 drinks of bourbon before but now she only has 3 to 4 ounces of white wine. She has moderate to severe dementia of mixed type. She cannot tolerate donepezil as it made her dizzy and drowsy. Does not like taking medications in general.  Does not do much physical or mental activity but will look at newspapers. Family does not think that she can follow what she is reading. She will try to answer questions open endedly and will repeat herself.     Current Outpatient Medications   Medication Sig    rivastigmine (EXELON) 4.6 MG/24HR Place 1 patch onto the skin daily    escitalopram (LEXAPRO) 10 MG tablet Take 1 tablet by mouth nightly    clindamycin (CLEOCIN) 300 MG capsule     Cholecalciferol 50 MCG (2000 UT) CAPS TAKE 1 TABLET BY MOUTH EVERY DAY (Patient not taking: Reported on 6/22/2023)    metroNIDAZOLE (METROCREAM) 0.75 % cream APPLY

## 2023-08-30 ENCOUNTER — HOSPITAL ENCOUNTER (EMERGENCY)
Facility: HOSPITAL | Age: 88
Discharge: HOME | End: 2023-08-30
Attending: STUDENT IN AN ORGANIZED HEALTH CARE EDUCATION/TRAINING PROGRAM
Payer: MEDICARE

## 2023-08-30 ENCOUNTER — APPOINTMENT (OUTPATIENT)
Facility: HOSPITAL | Age: 88
End: 2023-08-30
Payer: MEDICARE

## 2023-08-30 VITALS
TEMPERATURE: 97.8 F | HEART RATE: 66 BPM | RESPIRATION RATE: 18 BRPM | OXYGEN SATURATION: 99 % | DIASTOLIC BLOOD PRESSURE: 68 MMHG | SYSTOLIC BLOOD PRESSURE: 129 MMHG
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
## 2023-08-30 DIAGNOSIS — S01.01XA LACERATION OF SCALP, INITIAL ENCOUNTER: ICD-10-CM

## 2023-08-30 PROCEDURE — 72125 CT NECK SPINE W/O DYE: CPT

## 2023-08-30 PROCEDURE — 99284 EMERGENCY DEPT VISIT MOD MDM: CPT

## 2023-08-30 PROCEDURE — 90714 TD VACC NO PRESV 7 YRS+ IM: CPT | Performed by: FAMILY MEDICINE

## 2023-08-30 PROCEDURE — 70450 CT HEAD/BRAIN W/O DYE: CPT

## 2023-08-30 PROCEDURE — 73502 X-RAY EXAM HIP UNI 2-3 VIEWS: CPT

## 2023-08-30 PROCEDURE — 2500000003 HC RX 250 WO HCPCS: Performed by: FAMILY MEDICINE

## 2023-08-30 PROCEDURE — 6360000002 HC RX W HCPCS: Performed by: FAMILY MEDICINE

## 2023-08-30 PROCEDURE — 90471 IMMUNIZATION ADMIN: CPT | Performed by: FAMILY MEDICINE

## 2023-08-30 PROCEDURE — 12002 RPR S/N/AX/GEN/TRNK2.6-7.5CM: CPT

## 2023-08-30 RX ORDER — LIDOCAINE HYDROCHLORIDE 10 MG/ML
5 INJECTION, SOLUTION EPIDURAL; INFILTRATION; INTRACAUDAL; PERINEURAL
Status: COMPLETED | OUTPATIENT
Start: 2023-08-30 | End: 2023-08-30

## 2023-08-30 RX ADMIN — CLOSTRIDIUM TETANI TOXOID ANTIGEN (FORMALDEHYDE INACTIVATED) AND CORYNEBACTERIUM DIPHTHERIAE TOXOID ANTIGEN (FORMALDEHYDE INACTIVATED) 0.5 ML: 5; 2 INJECTION, SUSPENSION INTRAMUSCULAR at 16:52

## 2023-08-30 RX ADMIN — LIDOCAINE HYDROCHLORIDE 5 ML: 10 INJECTION, SOLUTION EPIDURAL; INFILTRATION; INTRACAUDAL; PERINEURAL at 16:53

## 2023-08-30 ASSESSMENT — ENCOUNTER SYMPTOMS
VOMITING: 0
NAUSEA: 0
BACK PAIN: 0
ABDOMINAL PAIN: 0
SHORTNESS OF BREATH: 0
COUGH: 0

## 2023-08-30 ASSESSMENT — PAIN SCALES - GENERAL: PAINLEVEL_OUTOF10: 0

## 2023-08-30 NOTE — ED TRIAGE NOTES
TRIAGE: Pt arrives after she reportedly fell and hit her head on a statue after losing balance trying to get to the phone. Per family, patient landed on left hip but was able to walk post fall. Hx dementia. Denies blood thinners. Unknown tetanus status.

## 2023-08-30 NOTE — ED PROVIDER NOTES
for agitation. Except as noted above the remainder of the review of systems was reviewed and negative.        PAST MEDICAL HISTORY     Past Medical History:   Diagnosis Date    AR (allergic rhinitis)     GERD (gastroesophageal reflux disease)     Hypercholesterolemia     Insomnia 11/13/2013    Osteopenia 11/2010    repeat DEXA 2017; on Fosamax 35 mg weekly    Pre-diabetes     Unspecified vitamin D deficiency 2009    Urinary incontinence     mild         SURGICAL HISTORY       Past Surgical History:   Procedure Laterality Date    ORTHOPEDIC SURGERY  10/99     R hip replacement >infected redone 4/00>10/00    ORTHOPEDIC SURGERY      LT KNEE ARTHROSCOPY    ORTHOPEDIC SURGERY  7/15/13    R knee replacement    TONSILLECTOMY AND ADENOIDECTOMY      age 9         CURRENT MEDICATIONS       Previous Medications    CHOLECALCIFEROL 50 MCG (2000 UT) CAPS    TAKE 1 TABLET BY MOUTH EVERY DAY    CLINDAMYCIN (CLEOCIN) 300 MG CAPSULE        ESCITALOPRAM (LEXAPRO) 10 MG TABLET    Take 1 tablet by mouth nightly    METRONIDAZOLE (METROCREAM) 0.75 % CREAM    APPLY TOPICALLY TO THE AFFECTED AREA TWICE DAILY    RIVASTIGMINE (EXELON) 4.6 MG/24HR    Place 1 patch onto the skin daily       ALLERGIES     Morphine, Gentamicin, Penicillins, Tetracycline, Codeine, Hydrocodone, Iodine, and Tramadol    FAMILY HISTORY       Family History   Problem Relation Age of Onset    Cancer Sister         lung    Psychiatric Disorder Daughter         drug overdose          SOCIAL HISTORY       Social History     Socioeconomic History    Marital status:      Spouse name: None    Number of children: None    Years of education: None    Highest education level: None   Tobacco Use    Smoking status: Never     Passive exposure: Past    Smokeless tobacco: Never   Vaping Use    Vaping Use: Never used   Substance and Sexual Activity    Alcohol use: Yes    Drug use: No    Sexual activity: Not Currently   Social History Narrative    1/5/2017  Lives in a two

## 2023-09-11 ENCOUNTER — OFFICE VISIT (OUTPATIENT)
Age: 88
End: 2023-09-11
Payer: MEDICARE

## 2023-09-11 VITALS
HEIGHT: 66 IN | DIASTOLIC BLOOD PRESSURE: 68 MMHG | TEMPERATURE: 97.1 F | BODY MASS INDEX: 23.14 KG/M2 | HEART RATE: 89 BPM | SYSTOLIC BLOOD PRESSURE: 110 MMHG | RESPIRATION RATE: 14 BRPM | WEIGHT: 144 LBS | OXYGEN SATURATION: 98 %

## 2023-09-11 DIAGNOSIS — E55.9 VITAMIN D DEFICIENCY: ICD-10-CM

## 2023-09-11 DIAGNOSIS — F03.90 DEMENTIA, UNSPECIFIED DEMENTIA SEVERITY, UNSPECIFIED DEMENTIA TYPE, UNSPECIFIED WHETHER BEHAVIORAL, PSYCHOTIC, OR MOOD DISTURBANCE OR ANXIETY (HCC): Primary | ICD-10-CM

## 2023-09-11 DIAGNOSIS — R73.03 PRE-DIABETES: ICD-10-CM

## 2023-09-11 DIAGNOSIS — R35.0 URINARY FREQUENCY: ICD-10-CM

## 2023-09-11 DIAGNOSIS — R35.0 URINARY FREQUENCY: Primary | ICD-10-CM

## 2023-09-11 DIAGNOSIS — E78.00 HYPERCHOLESTEROLEMIA: ICD-10-CM

## 2023-09-11 DIAGNOSIS — Z48.02 VISIT FOR SUTURE REMOVAL: ICD-10-CM

## 2023-09-11 DIAGNOSIS — R63.4 WEIGHT LOSS: ICD-10-CM

## 2023-09-11 LAB
25(OH)D3 SERPL-MCNC: 19.9 NG/ML (ref 30–100)
ALBUMIN SERPL-MCNC: 3.5 G/DL (ref 3.5–5)
ALBUMIN/GLOB SERPL: 0.9 (ref 1.1–2.2)
ALP SERPL-CCNC: 164 U/L (ref 45–117)
ALT SERPL-CCNC: 13 U/L (ref 12–78)
ANION GAP SERPL CALC-SCNC: 4 MMOL/L (ref 5–15)
AST SERPL-CCNC: 13 U/L (ref 15–37)
BASOPHILS # BLD: 0 K/UL (ref 0–0.1)
BASOPHILS NFR BLD: 0 % (ref 0–1)
BILIRUB SERPL-MCNC: 1.6 MG/DL (ref 0.2–1)
BUN SERPL-MCNC: 19 MG/DL (ref 6–20)
BUN/CREAT SERPL: 17 (ref 12–20)
CALCIUM SERPL-MCNC: 9.5 MG/DL (ref 8.5–10.1)
CHLORIDE SERPL-SCNC: 102 MMOL/L (ref 97–108)
CHOLEST SERPL-MCNC: 345 MG/DL
CO2 SERPL-SCNC: 30 MMOL/L (ref 21–32)
CREAT SERPL-MCNC: 1.09 MG/DL (ref 0.55–1.02)
DIFFERENTIAL METHOD BLD: NORMAL
EOSINOPHIL # BLD: 0.1 K/UL (ref 0–0.4)
EOSINOPHIL NFR BLD: 2 % (ref 0–7)
ERYTHROCYTE [DISTWIDTH] IN BLOOD BY AUTOMATED COUNT: 13.2 % (ref 11.5–14.5)
EST. AVERAGE GLUCOSE BLD GHB EST-MCNC: 128 MG/DL
FOLATE SERPL-MCNC: 5.8 NG/ML (ref 5–21)
GLOBULIN SER CALC-MCNC: 3.8 G/DL (ref 2–4)
GLUCOSE SERPL-MCNC: 124 MG/DL (ref 65–100)
HBA1C MFR BLD: 6.1 % (ref 4–5.6)
HCT VFR BLD AUTO: 42.5 % (ref 35–47)
HDLC SERPL-MCNC: 62 MG/DL
HDLC SERPL: 5.6 (ref 0–5)
HGB BLD-MCNC: 14 G/DL (ref 11.5–16)
IMM GRANULOCYTES # BLD AUTO: 0 K/UL (ref 0–0.04)
IMM GRANULOCYTES NFR BLD AUTO: 0 % (ref 0–0.5)
LDLC SERPL CALC-MCNC: 251 MG/DL (ref 0–100)
LYMPHOCYTES # BLD: 1.2 K/UL (ref 0.8–3.5)
LYMPHOCYTES NFR BLD: 19 % (ref 12–49)
MCH RBC QN AUTO: 30.4 PG (ref 26–34)
MCHC RBC AUTO-ENTMCNC: 32.9 G/DL (ref 30–36.5)
MCV RBC AUTO: 92.2 FL (ref 80–99)
MONOCYTES # BLD: 0.3 K/UL (ref 0–1)
MONOCYTES NFR BLD: 5 % (ref 5–13)
NEUTS SEG # BLD: 4.9 K/UL (ref 1.8–8)
NEUTS SEG NFR BLD: 74 % (ref 32–75)
NRBC # BLD: 0 K/UL (ref 0–0.01)
NRBC BLD-RTO: 0 PER 100 WBC
PLATELET # BLD AUTO: 288 K/UL (ref 150–400)
PMV BLD AUTO: 10 FL (ref 8.9–12.9)
POTASSIUM SERPL-SCNC: 4.6 MMOL/L (ref 3.5–5.1)
PROT SERPL-MCNC: 7.3 G/DL (ref 6.4–8.2)
RBC # BLD AUTO: 4.61 M/UL (ref 3.8–5.2)
SODIUM SERPL-SCNC: 136 MMOL/L (ref 136–145)
TRIGL SERPL-MCNC: 160 MG/DL
TSH SERPL DL<=0.05 MIU/L-ACNC: 1.21 UIU/ML (ref 0.36–3.74)
VIT B12 SERPL-MCNC: 327 PG/ML (ref 193–986)
VLDLC SERPL CALC-MCNC: 32 MG/DL
WBC # BLD AUTO: 6.6 K/UL (ref 3.6–11)

## 2023-09-11 PROCEDURE — 15853 REMOVAL SUTR/STAPL XREQ ANES: CPT | Performed by: FAMILY MEDICINE

## 2023-09-11 PROCEDURE — 1090F PRES/ABSN URINE INCON ASSESS: CPT | Performed by: FAMILY MEDICINE

## 2023-09-11 PROCEDURE — 99214 OFFICE O/P EST MOD 30 MIN: CPT | Performed by: FAMILY MEDICINE

## 2023-09-11 PROCEDURE — G8420 CALC BMI NORM PARAMETERS: HCPCS | Performed by: FAMILY MEDICINE

## 2023-09-11 PROCEDURE — 1036F TOBACCO NON-USER: CPT | Performed by: FAMILY MEDICINE

## 2023-09-11 PROCEDURE — G8427 DOCREV CUR MEDS BY ELIG CLIN: HCPCS | Performed by: FAMILY MEDICINE

## 2023-09-11 PROCEDURE — 1123F ACP DISCUSS/DSCN MKR DOCD: CPT | Performed by: FAMILY MEDICINE

## 2023-09-11 ASSESSMENT — ENCOUNTER SYMPTOMS
SHORTNESS OF BREATH: 0
DIARRHEA: 0
ABDOMINAL PAIN: 0
VOMITING: 0
COUGH: 0
WHEEZING: 0
NAUSEA: 0
CHEST TIGHTNESS: 0
CONSTIPATION: 0

## 2023-09-11 NOTE — PROGRESS NOTES
Patient Name: Art Michele   MRN: [id number]    Denny Burkitt is a 80 y.o. female who presents with the following:     Here with daughter. Sustained a ground-level fall on August 30 where she tripped and hit her head. She went to the ER where they did a CT head and neck which did not show any acute abnormality. She had 1 staple placed on the back of her scalp. Since then she has been feeling well. Her neurologist recently started her on Exelon patches but the daughter thinks that she may be having more dizziness with it. She has lost about 20 pounds over the past year. She does not often remember to eat and usually will eat 1 good meal a day. She does urinate frequently. Wt Readings from Last 3 Encounters:   09/11/23 144 lb (65.3 kg)   06/22/23 151 lb 3.2 oz (68.6 kg)   08/19/22 168 lb (76.2 kg)     Review of Systems   Constitutional:  Positive for unexpected weight change. Negative for activity change, appetite change, fatigue and fever. Respiratory:  Negative for cough, chest tightness, shortness of breath and wheezing. Cardiovascular:  Negative for chest pain, palpitations and leg swelling. Gastrointestinal:  Negative for abdominal pain, constipation, diarrhea, nausea and vomiting. Genitourinary:  Negative for dysuria, frequency and urgency. Skin:  Negative for rash. Neurological:  Negative for dizziness, weakness and headaches. Psychiatric/Behavioral:  Negative for dysphoric mood and suicidal ideas. The patient is not nervous/anxious. All other systems reviewed and are negative. The patient's medications, allergies, past medical history, surgical history, family history and social history were reviewed and updated where appropriate.       Current Outpatient Medications:     rivastigmine (EXELON) 4.6 MG/24HR, Place 1 patch onto the skin daily, Disp: 30 patch, Rfl: 3    Allergies   Allergen Reactions    Morphine Nausea And Vomiting    Gentamicin Other (See

## 2023-09-12 RX ORDER — ERGOCALCIFEROL 1.25 MG/1
50000 CAPSULE ORAL WEEKLY
Qty: 8 CAPSULE | Refills: 0 | Status: SHIPPED | OUTPATIENT
Start: 2023-09-12

## 2023-09-15 DIAGNOSIS — R35.0 URINARY FREQUENCY: ICD-10-CM

## 2023-09-15 LAB
APPEARANCE UR: ABNORMAL
BACTERIA URNS QL MICRO: ABNORMAL /HPF
BILIRUB UR QL: NEGATIVE
COLOR UR: ABNORMAL
EPITH CASTS URNS QL MICRO: ABNORMAL /LPF
GLUCOSE UR STRIP.AUTO-MCNC: NEGATIVE MG/DL
HGB UR QL STRIP: NEGATIVE
HYALINE CASTS URNS QL MICRO: ABNORMAL /LPF (ref 0–5)
KETONES UR QL STRIP.AUTO: 15 MG/DL
LEUKOCYTE ESTERASE UR QL STRIP.AUTO: NEGATIVE
NITRITE UR QL STRIP.AUTO: NEGATIVE
PH UR STRIP: 5.5 (ref 5–8)
PROT UR STRIP-MCNC: 30 MG/DL
RBC #/AREA URNS HPF: ABNORMAL /HPF (ref 0–5)
SP GR UR REFRACTOMETRY: 1.02 (ref 1–1.03)
UROBILINOGEN UR QL STRIP.AUTO: 0.2 EU/DL (ref 0.2–1)
WBC URNS QL MICRO: ABNORMAL /HPF (ref 0–4)

## 2023-09-16 LAB
BACTERIA SPEC CULT: NORMAL
SERVICE CMNT-IMP: NORMAL

## 2023-09-21 ENCOUNTER — OFFICE VISIT (OUTPATIENT)
Age: 88
End: 2023-09-21
Payer: MEDICARE

## 2023-09-21 VITALS
HEART RATE: 84 BPM | DIASTOLIC BLOOD PRESSURE: 64 MMHG | SYSTOLIC BLOOD PRESSURE: 116 MMHG | TEMPERATURE: 97.7 F | OXYGEN SATURATION: 97 % | RESPIRATION RATE: 14 BRPM

## 2023-09-21 DIAGNOSIS — M25.562 ACUTE PAIN OF LEFT KNEE: Primary | ICD-10-CM

## 2023-09-21 PROCEDURE — 99214 OFFICE O/P EST MOD 30 MIN: CPT | Performed by: FAMILY MEDICINE

## 2023-09-21 PROCEDURE — G8427 DOCREV CUR MEDS BY ELIG CLIN: HCPCS | Performed by: FAMILY MEDICINE

## 2023-09-21 PROCEDURE — 1090F PRES/ABSN URINE INCON ASSESS: CPT | Performed by: FAMILY MEDICINE

## 2023-09-21 PROCEDURE — 1123F ACP DISCUSS/DSCN MKR DOCD: CPT | Performed by: FAMILY MEDICINE

## 2023-09-21 PROCEDURE — G8420 CALC BMI NORM PARAMETERS: HCPCS | Performed by: FAMILY MEDICINE

## 2023-09-21 PROCEDURE — 1036F TOBACCO NON-USER: CPT | Performed by: FAMILY MEDICINE

## 2023-09-21 RX ORDER — ACETAMINOPHEN 325 MG/1
650 TABLET ORAL EVERY 6 HOURS PRN
COMMUNITY

## 2023-09-22 DIAGNOSIS — M10.9 GOUT, UNSPECIFIED CAUSE, UNSPECIFIED CHRONICITY, UNSPECIFIED SITE: Primary | ICD-10-CM

## 2023-09-22 LAB
ANION GAP SERPL CALC-SCNC: 5 MMOL/L (ref 5–15)
BUN SERPL-MCNC: 23 MG/DL (ref 6–20)
BUN/CREAT SERPL: 19 (ref 12–20)
CALCIUM SERPL-MCNC: 9.5 MG/DL (ref 8.5–10.1)
CHLORIDE SERPL-SCNC: 103 MMOL/L (ref 97–108)
CO2 SERPL-SCNC: 29 MMOL/L (ref 21–32)
CREAT SERPL-MCNC: 1.21 MG/DL (ref 0.55–1.02)
GLUCOSE SERPL-MCNC: 102 MG/DL (ref 65–100)
POTASSIUM SERPL-SCNC: 4.2 MMOL/L (ref 3.5–5.1)
SODIUM SERPL-SCNC: 137 MMOL/L (ref 136–145)
URATE SERPL-MCNC: 6.5 MG/DL (ref 2.6–6)

## 2023-09-22 RX ORDER — PREDNISONE 10 MG/1
TABLET ORAL
Qty: 27 TABLET | Refills: 0 | Status: SHIPPED | OUTPATIENT
Start: 2023-09-22

## 2023-11-29 ENCOUNTER — TELEPHONE (OUTPATIENT)
Age: 88
End: 2023-11-29

## 2023-11-29 ENCOUNTER — NURSE TRIAGE (OUTPATIENT)
Dept: OTHER | Facility: CLINIC | Age: 88
End: 2023-11-29

## 2023-11-29 NOTE — TELEPHONE ENCOUNTER
Jane triage nurse called states the pt has been running a temp of 94.3 for past two days. She is pretty weak and her daughter thinks a gout flare up. Call transferred to nurse Mo Dockery.

## 2023-12-21 PROBLEM — R53.1 LEFT-SIDED WEAKNESS: Status: ACTIVE | Noted: 2023-12-21

## 2023-12-22 PROBLEM — I63.9 ACUTE CVA (CEREBROVASCULAR ACCIDENT) (HCC): Status: ACTIVE | Noted: 2023-12-22

## 2023-12-22 NOTE — ED NOTES
TRANSFER - OUT REPORT:    Verbal report given to Charla on Joya Zarate  being transferred to [facility] for routine progression of patient care       Report consisted of patient's Situation, Background, Assessment and   Recommendations(SBAR).     Information from the following report(s) Nurse Handoff Report, Index, ED Encounter Summary, MAR, and Recent Results was reviewed with the receiving nurse.    [place] Fall Assessment:    Presents to emergency department  because of falls (Syncope, seizure, or loss of consciousness): No  Age > 70: Yes  Altered Mental Status, Intoxication with alcohol or substance confusion (Disorientation, impaired judgment, poor safety awaremess, or inability to follow instructions): Yes  Impaired Mobility: Ambulates or transfers with assistive devices or assistance; Unable to ambulate or transer.: Yes  Nursing Judgement: Yes          Lines:   Peripheral IV 12/21/23 Posterior;Right Hand (Active)        Opportunity for questions and clarification was provided.      Patient transported with:  Tech

## 2023-12-22 NOTE — ED PROVIDER NOTES
[facility] EMERGENCY DEP  EMERGENCY DEPARTMENT ENCOUNTER      Pt Name: Joya Zarate  MRN: [id number]  Birthdate 1934  Date of evaluation: 12/21/2023  Provider: Marianna Gerardo DO    CHIEF COMPLAINT       Chief Complaint   Patient presents with    Fatigue         HISTORY OF PRESENT ILLNESS    HPI    Joya Zarate is a 89 y.o. female with a past medical history listed below who presents to the emergency department for evaluation of generalized weakness and fatigue.  Per EMS, daughter called for patient being generally weak and fatigued.  She has been largely unable to get out of bed for the last 5 days.  On my examination patient denies any complaints.  She is alert and oriented x 2, unsure of the date or upcoming holiday.  Denies any known recent illness.    On further discussion with patient's family at bedside, they noted that over the last 1 to 2 days he has been unable to use her left arm or leg due to weakness.  Notes she is typically generally weak all over but able to stand for transfers but has unable to do so recently.  They also note that she has had some slurred speech which is abnormal for her.     Nursing Notes were reviewed.    REVIEW OF SYSTEMS       Review of Systems   Unable to perform ROS: Mental status change           PAST MEDICAL HISTORY     Past Medical History:   Diagnosis Date    AR (allergic rhinitis)     GERD (gastroesophageal reflux disease)     Hypercholesterolemia     Insomnia 11/13/2013    Osteopenia 11/2010    repeat DEXA 2017; on Fosamax 35 mg weekly    Pre-diabetes     Unspecified vitamin D deficiency 2009    Urinary incontinence     mild         SURGICAL HISTORY       Past Surgical History:   Procedure Laterality Date    ORTHOPEDIC SURGERY  10/99     R hip replacement >infected redone 4/00>10/00    ORTHOPEDIC SURGERY      LT KNEE ARTHROSCOPY    ORTHOPEDIC SURGERY  7/15/13    R knee replacement    TONSILLECTOMY AND ADENOIDECTOMY      age 7         CURRENT MEDICATIONS

## 2023-12-22 NOTE — ED TRIAGE NOTES
TRIAGE: Pt arrives via EMS from home with c/o generalized weakness x5 days. Pt has not been ambulatory during that time and has not been able to sit for the past 2 days. Denies pain but yells out with movement. Hx dementia. .

## 2023-12-22 NOTE — H&P
History & Physical    Primary Care Provider: Harpreet Zuniga MD  Source of Information: Patient, family at bedside and chart review    History of Presenting Illness:   Joya Zarate is a 89 y.o. female with history of dementia, GERD, dyslipidemia, osteoporosis who presents to hospital for family concerns of weakness.  History from patient is limited due to her dementia but she answers simple questions appropriately  .  Family states she has been progressively weak over the last month.  She has intermittently complained of feeling ill without identifying particular symptoms.  She has also had decreased p.o. intake.  She was seen at urgent care a month ago and started on antibiotics for unclear reasons.  Family noted worsening weakness and inability to walk over the last 4 days.  Daughter at bedside states she has noted slurred speech and left-sided weakness.  Patient states she feels well and has no complaints or concerns.  The patient denies any fever, chills, chest or abdominal pain, nausea, vomiting, cough, congestion, recent illness, palpitations, or dysuria.    Remarkable vitals on ER Presentation: Vital signs stable  Labs Remarkable for: Labs overall unremarkable with urinalysis showing 1+ bacteria  ER Images: CT head, chest x-ray, x-ray of pelvis showed no acute process.  ER Rx: 1 L normal saline bolus, Toradol 15 mg     Review of Systems:  Pertinent items are noted in the History of Present Illness.     Past Medical History:   Diagnosis Date    AR (allergic rhinitis)     GERD (gastroesophageal reflux disease)     Hypercholesterolemia     Insomnia 11/13/2013    Osteopenia 11/2010    repeat DEXA 2017; on Fosamax 35 mg weekly    Pre-diabetes     Unspecified vitamin D deficiency 2009    Urinary incontinence     mild      Past Surgical History:   Procedure Laterality Date    ORTHOPEDIC SURGERY  10/99     R hip replacement >infected redone 4/00>10/00    ORTHOPEDIC SURGERY      LT KNEE ARTHROSCOPY

## 2023-12-22 NOTE — ED NOTES
Bedside and Verbal shift change report given to ANGY Hinton (oncoming nurse) by ANGY Duarte (offgoing nurse). Report included the following information ED SBAR, MAR, Recent Results, and Med Rec Status.

## 2023-12-23 NOTE — CONSULTS
Neurology Consult  Torri Harrington, KYLE - NP    Patient: Joya Zarate MRN: [id number]  SSN: [id number]    YOB: 1934  Age: 89 y.o.  Sex: female      Chief Complaint: left arm weakness    HPI:     Joya Zarate is a 89 y.o. White (non-)  female dementia, GERD, dyslipidemia, osteoporosis who presented to the ED 12/12/23 via EMS reporting generalized weakness and fatigue x 5 days but with new onset of left arm/leg weakness x 2 days with slurred speech. Family also reported intermittent \"spells\" where the patient looks to the right side, loses consciousness and is virtually unarousable for approximately 30 minutes.  After she \"comes to\" he reports that she has a period of 24 to 48 hours where she is recovering and \"out of it\". She has never been worked up for epilepsy or seizures. CT head was obtained which was negative for acute process. MRI brain was then obtained which showed small focus of acute infarction at the superior jorge on the right and punctate infarct at the left frontal cortex, and severe chronic microvascular ischemic changes and moderate to severe cerebral atrophy. No vessel imaging was obtained. She was not on ASA or statin at home but her daughter states she was giving the pt small doses of ibuprofen several days pta.     Past Medical History:   Diagnosis Date    AR (allergic rhinitis)     GERD (gastroesophageal reflux disease)     Hypercholesterolemia     Insomnia 11/13/2013    Osteopenia 11/2010    repeat DEXA 2017; on Fosamax 35 mg weekly    Pre-diabetes     Unspecified vitamin D deficiency 2009    Urinary incontinence     mild     Family History   Problem Relation Age of Onset    Cancer Sister         lung    Psychiatric Disorder Daughter         drug overdose     Social History     Tobacco Use    Smoking status: Never     Passive exposure: Past    Smokeless tobacco: Never   Substance Use Topics    Alcohol use: Yes      Current Facility-Administered Medications

## 2023-12-24 NOTE — CARE COORDINATION
Transition of Care Plan:    RUR: 14%  Prior Level of Functioning: unknown  Disposition: To SNF rehab. Provider ZAK.  If SNF or IPR: Date FOC offered: 12/23/23   Date FOC received: 12/23/23  Accepting facility: Pending  Date authorization started with reference number:   Date authorization received and expires:   Follow up appointments:   DME needed:   Transportation at discharge: TBD  IM/IMM Medicare/ letter given:   Is patient a  and connected with VA?    If yes, was [place] transfer form completed and VA notified?   Caregiver Contact: daughter Eli Lorenz [phone]  Discharge Caregiver contacted prior to discharge?   Care Conference needed?   Barriers to discharge:     CM spoke with the hospitalist NP who requested that CM start SNF rehab referral process.  CM met with patient and daughter Eli at the bedside, and they were in agreement with plan for SNF rehab. CM gave them the SNF provider list. Later, daughter gave CM 3 selections: [facility], [facility], and [facility]. CM sent referrals via [facility] to the 3 providers, and the responses are pending.

## 2023-12-24 NOTE — CARE COORDINATION
Transition of Care Plan:    RUR: 14% Low   Prior Level of Functioning: unknown  Disposition: To SNF rehab. Provider TBD.  If SNF or IPR: Date FOC offered: 12/23/23   Date FOC received: 12/23/23  Accepting facility: Pending  Date authorization started with reference number:   Date authorization received and expires:   Follow up appointments:   DME needed:   Transportation at discharge: TBD  IM/IMM Medicare/ letter given:   Is patient a  and connected with VA?    If yes, was [place] transfer form completed and VA notified?   Caregiver Contact: daughter Eli Lorenz [phone]  Discharge Caregiver contacted prior to discharge?   Care Conference needed?   Barriers to discharge:     Attending called this CM to advise pt is medically ready and requested auth be started. CM sent message to [facility], pt's first choice SNF, to determine if they can accept pt and have a bed.     CM reaching out to all SNF referrals and monitoring SNF referral in [facility]. [employer] auth will be started if [employer] department is available today.     9:58 [facility] is OON with [employer].   [facility] - pending - CM left  for admissions [phone] and [phone]  [facility] - pending - no admissions staff until 12/26    ARIAN Goldstein

## 2023-12-26 PROBLEM — R56.9 SEIZURE-LIKE ACTIVITY (HCC): Status: ACTIVE | Noted: 2023-01-01

## 2023-12-26 PROBLEM — R29.898 WEAKNESS OF LEFT ARM: Status: ACTIVE | Noted: 2023-12-26

## 2023-12-26 NOTE — CARE COORDINATION
Transition of Care: recs are for SNF; [facility] has accepted; ([employer] insurance auth started on 12/26)     [facility]- [phone]    fax- [phone]    ref# [id number]    [facility]- denied- OON  [facility]- denied- OON with insurance  [facility]- denied- no beds available     Transport Plan: will need stretcher (not set up yet)    RUR: 14%    Main contact is perry Lorenz- [phone]     Discharge pending:  -pending  insurance auth    0920: this CM called [phone] and spoke to Charlene- they are currently reviewing the referral; it is pending; this CM [facility]- [phone]; they have denied; no beds available       0950: this CM met with patients daughter at bedside to get more SNF choice; she is deciding on another choice at this time    [id number]: the family has chosen 2 more SNFs - [facility] and [facility]; referrals sent via [employer] and [employer]    1500: this CM noted that the referral is still pending with [facility]- called [facility] again to followup; no answer; left message for callback; this cM noted that [facility] has accepted    1530: this CM met again with patients daughter and HAL Heaton  to update on SNF acceptance to [facility]- they would like patien tto go to [facility]; this CM stated Human insurnce auth and called [employer] and faxed all the clinicals- ref # [id number]   12/26/23 0953   Condition of Participation: Discharge Planning   The Plan for Transition of Care is related to the following treatment goals: SNF   The Patient and/or Patient Representative was provided with a Choice of Provider? Patient;Patient Representative   Name of the Patient Representative who was provided with the Choice of Provider and agrees with the Discharge Plan?  trini Lorenz   The Patient and/Or Patient Representative agree with the Discharge Plan? Yes   Freedom of Choice list was provided with basic dialogue that supports the patient's individualized plan of care/goals, treatment preferences, and

## 2023-12-26 NOTE — PROCEDURES
[facility]  EEG    Name:  CARMEN RODRIGUEZ  MR#:  [id number]  :  1934  ACCOUNT #:  [id number]  DATE OF SERVICE:  2023      REQUESTING PHYSICIAN:  Javan Patterson MD    HISTORY:  The patient is an 89-year-old female who is being evaluated for symptoms of altered level of consciousness with head turning to the right.  There were strokes reported on the MRI.    DESCRIPTION:  This is an 18-channel EEG performed on an awake and drowsy patient.  During wakefulness, the dominant posterior background rhythm consists of medium voltage rhythms in the 7 Hz frequency range out of the posterior head region.  Intermittently, polymorphic slower theta frequencies were seen that appeared to be independent on two sides.  When the patient was clinically noted to be drowsy, there was generalized attenuation of background rhythms, left more than right.  Photic stimulation and hyperventilation was not performed.    EEG SUMMARY:  Mildly abnormal EEG due to mild slowing of background rhythms, left slightly more than right.    CLINICAL INTERPRETATION:  This EEG shows mild background slowing which is a nonspecific finding in this age group.  There was slightly greater slowing on the left, but without any epileptiform features.  No electrographic seizures or epileptiform disturbance was seen.  Please correlate with imaging.      BILL MADDOX MD      [id number]  D:  2023 13:15  T:  2023 14:25  JOB #:  [id number]

## 2023-12-27 ENCOUNTER — TELEPHONE (OUTPATIENT)
Facility: HOSPITAL | Age: 88
End: 2023-12-27

## 2023-12-27 NOTE — TELEPHONE ENCOUNTER
Pt needs a hospital follow up appointment  Provider: Caro  Location: [facility]  In person or virtual: either  When: 4-6 weeks  Diagnosis/reason for follow up:  stroke follow up, ?seizure episodes  Additional information: none

## 2023-12-27 NOTE — CARE COORDINATION
Transition of Care:   TBD; pending referral sent to [facility] hospice (late in the evening on 12/27)     (Previous plan: recs are for SNF; [facility] has accepted; bed is available; insurance was authorized on 12/27)    Contact at [facility] is [facility]- [phone]     [facility]- [phone]    fax- [phone]    ref# [id number]     Transport Plan: will need stretcher (not set up yet)     RUR: 13%     Main contact is daughter - Eli Lorenz- [phone]      Discharge pending:  -pending medical progress   -pending patient to void    1115: faxed to [facility] the updated PT notes for insurance auth     1158: [employer] called this CM; the insurance was authorized- valid from 12/27 and next review date is 12/29- contact will be Rebeca Doll- fax to [phone] approved for [facility]; this CM  called Fani at [facility] to inform; they will have a bed ready today (12/27); updated Fani with the [employer] insurance approval information    1445: this CM was informed by [facility] (Fani) that there is a bed available today room #323A report # [phone]; patient not medicaly ready for discharge today; updated Fani at [facility] with this information; she verbalized understanding    1700: CM noted hospice consult- sent referral to [facility] hospice via epic (failure to thrive)      Prior Level of Functioning: lives in a home alone; needs help with adls; baseline dementia; has supportive daughter and son in law  Disposition: [facility]  If SNF or IPR: Date FOC offered: 12/23/23   Date FOC received: 12/23/23  Accepting facility: [facility]  Date authorization started with reference number: 12/26 #[id number]  Date authorization received and expires: 12/27  expires on 12/29  Follow up appointments: specialists and pcp  DME needed: none needed if going to SNF  Transportation at discharge: needs stretcher- not set up yet  IM/IMM Medicare/ letter given: did not receive 1st IM at admission  Is patient a  and connected with VA?

## 2023-12-28 NOTE — CARE COORDINATION
Transition of Care Plan:    Hospice - GIP ([facility]) vs home    Transport: BLS if needed    RUR: 13%  Prior Level of Functioning: Required assist  Disposition: Hospice  Follow up appointments: None  DME needed: Defer to hospice   Transportation at discharge: BLS  IM/IMM Medicare/ letter given: 12/27  Caregiver Contact: daughter Romelia Lorenz- [phone]   Discharge Caregiver contacted prior to discharge?   Care Conference needed?   Barriers to discharge: Hospice - eval for GIP vs home    Per hospice RN note, Pt on wait list for community hospice house. Plan to eval Pt tomorrow morning for GIP vs home hospice.     [facility] liaison provided with update.    CM will follow.    MANDA Bedoya (Ally).

## 2023-12-29 NOTE — CARE COORDINATION
Transition of Care:   TBD; [facility] is continuing to monitor and decide plan; possibly GIP vs community hospice house vs home hospice; per chart; [facility] is monitoring patient this weekend (patient is on comfort care)      (Previous plan: recs were  for SNF; [facility] had  accepted; bed is available; insurance was authorized on 12/27)       Transport Plan: will need stretcher (not set up yet)     RUR: 13%     Main contact is daughter Romelia Lorenz- [phone]      Discharge pending:  -patient continues on comfort care  -pending monitoring of hospice plan with [facility] (GIP, hospice house or home with hospice)      1740: per chart review; [facility] continues to monitor patient to determine plan; patient is on comfort care at this time    Prior Level of Functioning: lives in a home alone; needs help with adls; baseline dementia; has supportive daughter and son in law  Disposition: hospice plan is pending; per chart- they are monitoring patient this weekend   If SNF or IPR: Date FOC offered: 12/23/23 (for [facility]- no longer going to SNF)   Date FOC received: 12/23/23  Accepting facility: [facility] (not the plan anymore)   Date authorization started with reference number: 12/26 #[id number]  Date authorization received and expires: 12/27  expires on 12/29  Follow up appointments: specialists and pcp  DME needed: none needed if going to SNF- none if going home with hospice   Transportation at discharge: needs stretcher- not set up yet  IM/IMM Medicare/ letter given: did not receive 1st IM at admission  Is patient a  and connected with VA? no              If yes, was  transfer form completed and VA notified? n/a  Caregiver Contact: perry Lorenz [phone]  Discharge Caregiver contacted prior to discharge? not yet  Care Conference needed? no  Barriers to discharge:  hospice plan- possibly GIP or [facility] house or home with [facility]

## 2023-12-31 PROBLEM — Z51.5 HOSPICE CARE: Status: ACTIVE | Noted: 2023-12-31

## 2024-01-01 ENCOUNTER — HOME CARE VISIT (OUTPATIENT)
Age: 89
End: 2024-01-01
Payer: MEDICARE

## 2024-01-01 ENCOUNTER — HOME CARE VISIT (OUTPATIENT)
Facility: HOME HEALTH | Age: 89
End: 2024-01-01
Payer: MEDICARE

## 2024-01-01 VITALS
TEMPERATURE: 98 F | HEART RATE: 120 BPM | RESPIRATION RATE: 24 BRPM | OXYGEN SATURATION: 92 % | SYSTOLIC BLOOD PRESSURE: 104 MMHG | DIASTOLIC BLOOD PRESSURE: 40 MMHG

## 2024-01-01 VITALS
TEMPERATURE: 97.2 F | HEART RATE: 88 BPM | SYSTOLIC BLOOD PRESSURE: 85 MMHG | OXYGEN SATURATION: 92 % | DIASTOLIC BLOOD PRESSURE: 60 MMHG | RESPIRATION RATE: 16 BRPM

## 2024-01-01 VITALS
OXYGEN SATURATION: 97 % | HEIGHT: 66 IN | HEART RATE: 93 BPM | WEIGHT: 133.16 LBS | BODY MASS INDEX: 21.4 KG/M2 | RESPIRATION RATE: 33 BRPM | SYSTOLIC BLOOD PRESSURE: 113 MMHG | TEMPERATURE: 98.7 F | DIASTOLIC BLOOD PRESSURE: 63 MMHG

## 2024-01-01 VITALS — RESPIRATION RATE: 24 BRPM | TEMPERATURE: 98.1 F

## 2024-01-01 VITALS
RESPIRATION RATE: 16 BRPM | SYSTOLIC BLOOD PRESSURE: 97 MMHG | OXYGEN SATURATION: 90 % | DIASTOLIC BLOOD PRESSURE: 62 MMHG | TEMPERATURE: 46.4 F | HEART RATE: 89 BPM

## 2024-01-01 VITALS
TEMPERATURE: 99.9 F | HEART RATE: 65 BPM | DIASTOLIC BLOOD PRESSURE: 61 MMHG | RESPIRATION RATE: 22 BRPM | SYSTOLIC BLOOD PRESSURE: 84 MMHG

## 2024-01-01 VITALS — RESPIRATION RATE: 22 BRPM | HEART RATE: 120 BPM | TEMPERATURE: 98.4 F

## 2024-01-01 PROCEDURE — G0299 HHS/HOSPICE OF RN EA 15 MIN: HCPCS

## 2024-01-01 PROCEDURE — 0651 HSPC ROUTINE HOME CARE

## 2024-01-01 PROCEDURE — 2700000000 HC OXYGEN THERAPY PER DAY

## 2024-01-01 PROCEDURE — 2580000003 HC RX 258: Performed by: NURSE PRACTITIONER

## 2024-01-01 PROCEDURE — 6370000000 HC RX 637 (ALT 250 FOR IP): Performed by: NURSE PRACTITIONER

## 2024-01-01 PROCEDURE — 3331090004 HSPC SERVICE INTENSITY ADD-ON

## 2024-01-01 PROCEDURE — 2580000003 HC RX 258: Performed by: STUDENT IN AN ORGANIZED HEALTH CARE EDUCATION/TRAINING PROGRAM

## 2024-01-01 PROCEDURE — G0156 HHCP-SVS OF AIDE,EA 15 MIN: HCPCS

## 2024-01-01 PROCEDURE — 1100000000 HC RM PRIVATE

## 2024-01-01 PROCEDURE — 94760 N-INVAS EAR/PLS OXIMETRY 1: CPT

## 2024-01-01 PROCEDURE — 2500000001 HSPC NON INJECTABLE MED

## 2024-01-01 RX ORDER — LORAZEPAM 2 MG/ML
1 CONCENTRATE ORAL EVERY 8 HOURS PRN
Qty: 4.5 ML | Refills: 0 | Status: SHIPPED
Start: 2024-01-01 | End: 2024-01-01

## 2024-01-01 RX ORDER — HYDROMORPHONE HYDROCHLORIDE 1 MG/ML
1 SOLUTION ORAL EVERY 4 HOURS
Qty: 18 ML | Refills: 0 | Status: SHIPPED
Start: 2024-01-01 | End: 2024-01-01

## 2024-01-01 RX ADMIN — Medication 1 MG: at 01:26

## 2024-01-01 RX ADMIN — Medication 1 MG: at 06:18

## 2024-01-01 RX ADMIN — Medication 1 MG: at 13:16

## 2024-01-01 RX ADMIN — SODIUM CHLORIDE, PRESERVATIVE FREE 10 ML: 5 INJECTION INTRAVENOUS at 21:08

## 2024-01-01 RX ADMIN — Medication 1 MG: at 08:57

## 2024-01-01 RX ADMIN — Medication 1 MG: at 10:13

## 2024-01-01 RX ADMIN — SODIUM CHLORIDE, PRESERVATIVE FREE 10 ML: 5 INJECTION INTRAVENOUS at 10:14

## 2024-01-01 RX ADMIN — SODIUM CHLORIDE, PRESERVATIVE FREE 10 ML: 5 INJECTION INTRAVENOUS at 10:13

## 2024-01-01 RX ADMIN — HYDROMORPHONE HYDROCHLORIDE 1 MG: 1 SOLUTION ORAL at 13:05

## 2024-01-01 RX ADMIN — Medication 1 MG: at 00:54

## 2024-01-01 RX ADMIN — Medication 1 MG: at 05:27

## 2024-01-01 RX ADMIN — Medication 1 MG: at 21:07

## 2024-01-01 RX ADMIN — Medication 1 MG: at 17:48

## 2024-01-01 RX ADMIN — Medication 1 MG: at 13:25

## 2024-01-01 RX ADMIN — LORAZEPAM 1 MG: 2 CONCENTRATE ORAL at 17:06

## 2024-01-01 RX ADMIN — SODIUM CHLORIDE, PRESERVATIVE FREE 10 ML: 5 INJECTION INTRAVENOUS at 09:00

## 2024-01-01 ASSESSMENT — ENCOUNTER SYMPTOMS
DYSPNEA ACTIVITY LEVEL: WHILE SPEAKING
BOWEL INCONTINENCE: 1
BOWEL INCONTINENCE: 1
TROUBLE SWALLOWING: 1

## 2024-01-02 NOTE — PLAN OF CARE
Problem: Occupational Therapy - Adult  Goal: By Discharge: Performs self-care activities at highest level of function for planned discharge setting.  See evaluation for individualized goals.  Description: FUNCTIONAL STATUS PRIOR TO ADMISSION:  Per chart pt with at least a month month decline in cognition and function and not able to amb 4 days prior to admission.  Pt is a poor historian and unable to prove meaningful history.    HOME SUPPORT: Patient lived with .    Occupational Therapy Goals:  Initiated 12/22/2023  1.  Patient will perform self-feeding with Supervision and Set-up within 7 day(s).  2.  Patient will perform grooming with Set-up and Supervision within 7 day(s).  3.  Patient will perform upper body dressing with Minimal Assist within 7 day(s).  4.  Patient will perform toilet transfers with Moderate Assist  within 7 day(s).  5.  Patient will perform all aspects of toileting with Maximal Assist within 7 day(s).  6.  Patient will participate in upper extremity therapeutic exercise/activities with Supervision for 10 minutes within 7 day(s).    7.  Patient will utilize energy conservation techniques during functional activities with verbal and visual cues within 7 day(s).   Outcome: Not Progressing   OCCUPATIONAL THERAPY TREATMENT  Patient: Joya Zarate (89 y.o. female)  Date: 12/26/2023  Primary Diagnosis: Left-sided weakness [R53.1]  Weakness of left leg [R29.898]  Weakness of left arm [R29.898]  Acute CVA (cerebrovascular accident) (HCC) [I63.9]       Precautions: Fall Risk                Chart, occupational therapy assessment, plan of care, and goals were reviewed.    ASSESSMENT  Patient continues to benefit from skilled OT services and is not progressing towards goals d/t cognition (oriented to name, insight, processing, problem solving, command following, motivation, agitation), LUE deficits (strength, ROM, edema, pain), L facial droop, R gaze preference. Patient received with family at 
  Problem: Physical Therapy - Adult  Goal: By Discharge: Performs mobility at highest level of function for planned discharge setting.  See evaluation for individualized goals.  Description: FUNCTIONAL STATUS PRIOR TO ADMISSION: Patient was independent and active without use of DME. Pt with dementia. Per chart, pt with inability to walk past several days.    HOME SUPPORT PRIOR TO ADMISSION: The patient lived with .    Physical Therapy Goals  Initiated 12/22/2023  1.  Patient will move from supine to sit and sit to supine in bed with minimal assistance within 7 day(s).    2.  Patient will perform sit to stand with moderate assistance within 7 day(s).  3.  Patient will transfer from bed to chair and chair to bed with moderate assistance using the least restrictive device within 7 day(s).  4.  Patient will ambulate with moderate assistance for 50 feet with the least restrictive device within 7 day(s).   5.  Patient will ascend/descend 2 stairs with one handrail(s) with moderate assistance within 7 day(s).    Outcome: Progressing     PHYSICAL THERAPY TREATMENT    Patient: Joya Zarate (89 y.o. female)  Date: 12/27/2023  Diagnosis: Left-sided weakness [R53.1]  Weakness of left leg [R29.898]  Weakness of left arm [R29.898]  Acute CVA (cerebrovascular accident) (HCC) [I63.9] Left-sided weakness      Precautions: Fall Risk                    ASSESSMENT:  Patient continues to benefit from skilled PT services and is slowly progressing towards goals however remains most limited by lethargy, decreased command following, dysarthria, global weakness/debility (L > R), L inattention, decr tolerance to activity, and poor sitting balance following leading to increased falls risk and dependency from baseline level.     Received pt supine in bed, very drowsy requiring max and persistent stimulation to participate. Faciliated supine<>sit EOB with max/total A x1 with increased time and one step cues. In sitting, worked on 
  Problem: Physical Therapy - Adult  Goal: By Discharge: Performs mobility at highest level of function for planned discharge setting.  See evaluation for individualized goals.  Description: FUNCTIONAL STATUS PRIOR TO ADMISSION: Patient was independent and active without use of DME. Pt with dementia. Per chart, pt with inability to walk past several days.    HOME SUPPORT PRIOR TO ADMISSION: The patient lived with .    Physical Therapy Goals  Initiated 12/22/2023  1.  Patient will move from supine to sit and sit to supine in bed with minimal assistance within 7 day(s).    2.  Patient will perform sit to stand with moderate assistance within 7 day(s).  3.  Patient will transfer from bed to chair and chair to bed with moderate assistance using the least restrictive device within 7 day(s).  4.  Patient will ambulate with moderate assistance for 50 feet with the least restrictive device within 7 day(s).   5.  Patient will ascend/descend 2 stairs with one handrail(s) with moderate assistance within 7 day(s).    Outcome: Progressing   PHYSICAL THERAPY EVALUATION    Patient: Joya Zarate (89 y.o. female)  Date: 12/22/2023  Primary Diagnosis: Left-sided weakness [R53.1]  Acute CVA (cerebrovascular accident) ([facility]) [I63.9]       Precautions: Fall Risk                    ASSESSMENT :   DEFICITS/IMPAIRMENTS:   The patient is limited by decreased functional mobility, ROM, strength, body mechanics, activity tolerance, endurance, safety awareness, cognition, command following, attention/concentration, coordination, balance, proprioception CT head demonstrated \"Small focus of acute infarction at the superior jorge on the right and punctate infarct at the left frontal cortex.\"    Based on the impairments listed above patient presents below baseline for functional mobility. Pt with guarding of R knee and noted significant trunk leaning upon sitting that improved with time. Pt performed sit<>stand with max A x2 then required 
  Problem: Skin/Tissue Integrity  Goal: Absence of new skin breakdown  Description: 1.  Monitor for areas of redness and/or skin breakdown  2.  Assess vascular access sites hourly  3.  Every 4-6 hours minimum:  Change oxygen saturation probe site  4.  Every 4-6 hours:  If on nasal continuous positive airway pressure, respiratory therapy assess nares and determine need for appliance change or resting period.  1/2/2024 1207 by aDya Rodriguez RN  Outcome: Progressing  1/2/2024 0017 by Azra Smith RN  Outcome: Progressing     Problem: Safety - Adult  Goal: Free from fall injury  1/2/2024 1207 by Daya Rodriguez RN  Outcome: Progressing  1/2/2024 0017 by Azra Smith RN  Outcome: Progressing     Problem: Discharge Planning  Goal: Discharge to home or other facility with appropriate resources  1/2/2024 1207 by Daya Rodriguez RN  Outcome: Progressing  1/2/2024 0017 by Azra Smith RN  Outcome: Progressing  Flowsheets (Taken 1/1/2024 2000)  Discharge to home or other facility with appropriate resources: Identify barriers to discharge with patient and caregiver     Problem: Chronic Conditions and Co-morbidities  Goal: Patient's chronic conditions and co-morbidity symptoms are monitored and maintained or improved  1/2/2024 1207 by Daya Rodriguez RN  Outcome: Progressing  1/2/2024 0017 by Azra Smith RN  Outcome: Progressing  Flowsheets (Taken 1/1/2024 2000)  Care Plan - Patient's Chronic Conditions and Co-Morbidity Symptoms are Monitored and Maintained or Improved: Monitor and assess patient's chronic conditions and comorbid symptoms for stability, deterioration, or improvement     Problem: Pain  Goal: Verbalizes/displays adequate comfort level or baseline comfort level  1/2/2024 1207 by Daya Rodriguez RN  Outcome: Progressing  1/2/2024 0017 by Azra Smith RN  Outcome: Progressing     
  Problem: Skin/Tissue Integrity  Goal: Absence of new skin breakdown  Description: 1.  Monitor for areas of redness and/or skin breakdown  2.  Assess vascular access sites hourly  3.  Every 4-6 hours minimum:  Change oxygen saturation probe site  4.  Every 4-6 hours:  If on nasal continuous positive airway pressure, respiratory therapy assess nares and determine need for appliance change or resting period.  12/22/2023 2301 by Azra Smith RN  Outcome: Progressing  12/22/2023 2301 by Azra Smith, RN  Outcome: Progressing     Problem: Physical Therapy - Adult  Goal: By Discharge: Performs mobility at highest level of function for planned discharge setting.  See evaluation for individualized goals.  Description: FUNCTIONAL STATUS PRIOR TO ADMISSION: Patient was independent and active without use of DME. Pt with dementia. Per chart, pt with inability to walk past several days.    HOME SUPPORT PRIOR TO ADMISSION: The patient lived with .    Physical Therapy Goals  Initiated 12/22/2023  1.  Patient will move from supine to sit and sit to supine in bed with minimal assistance within 7 day(s).    2.  Patient will perform sit to stand with moderate assistance within 7 day(s).  3.  Patient will transfer from bed to chair and chair to bed with moderate assistance using the least restrictive device within 7 day(s).  4.  Patient will ambulate with moderate assistance for 50 feet with the least restrictive device within 7 day(s).   5.  Patient will ascend/descend 2 stairs with one handrail(s) with moderate assistance within 7 day(s).    12/22/2023 1151 by Jannie Ballard, PT  Outcome: Progressing     Problem: Occupational Therapy - Adult  Goal: By Discharge: Performs self-care activities at highest level of function for planned discharge setting.  See evaluation for individualized goals.  Description: FUNCTIONAL STATUS PRIOR TO ADMISSION:  Per chart pt with at least a month month decline in cognition and function 
  Problem: Skin/Tissue Integrity  Goal: Absence of new skin breakdown  Description: 1.  Monitor for areas of redness and/or skin breakdown  2.  Assess vascular access sites hourly  3.  Every 4-6 hours minimum:  Change oxygen saturation probe site  4.  Every 4-6 hours:  If on nasal continuous positive airway pressure, respiratory therapy assess nares and determine need for appliance change or resting period.  12/23/2023 1250 by Altagracia Nieves RN  Outcome: Progressing  12/22/2023 2301 by Azra Smith RN  Outcome: Progressing  12/22/2023 2301 by Azra Smith RN  Outcome: Progressing     Problem: Safety - Adult  Goal: Free from fall injury  12/23/2023 1250 by Altagracia Nieves RN  Outcome: Progressing  Flowsheets (Taken 12/23/2023 0800)  Free From Fall Injury: Instruct family/caregiver on patient safety  12/22/2023 2301 by Azra Smith RN  Outcome: Progressing  12/22/2023 2301 by Azra Smith RN  Outcome: Progressing  Flowsheets (Taken 12/22/2023 1625 by Charla Foster, RN)  Free From Fall Injury: Instruct family/caregiver on patient safety     Problem: Discharge Planning  Goal: Discharge to home or other facility with appropriate resources  12/23/2023 1250 by Altagracia Nieves RN  Outcome: Progressing  Flowsheets (Taken 12/23/2023 0800)  Discharge to home or other facility with appropriate resources: Identify barriers to discharge with patient and caregiver  12/22/2023 2301 by Azra Smith RN  Outcome: Progressing  12/22/2023 2301 by Azra Smith RN  Outcome: Progressing  Flowsheets  Taken 12/22/2023 2000 by Azra Smith RN  Discharge to home or other facility with appropriate resources: Identify barriers to discharge with patient and caregiver  Taken 12/22/2023 1625 by Charla Foster RN  Discharge to home or other facility with appropriate resources: Identify barriers to discharge with patient and caregiver     Problem: Chronic Conditions and Co-morbidities  Goal: Patient's chronic conditions and 
  Problem: Skin/Tissue Integrity  Goal: Absence of new skin breakdown  Description: 1.  Monitor for areas of redness and/or skin breakdown  2.  Assess vascular access sites hourly  3.  Every 4-6 hours minimum:  Change oxygen saturation probe site  4.  Every 4-6 hours:  If on nasal continuous positive airway pressure, respiratory therapy assess nares and determine need for appliance change or resting period.  12/24/2023 1021 by Nicole Flood RN  Outcome: Progressing  12/24/2023 0142 by Charlene Villeda RN  Outcome: Progressing     Problem: Safety - Adult  Goal: Free from fall injury  12/24/2023 1021 by Nicole Flood RN  Outcome: Progressing  12/24/2023 0142 by Charlene Villeda RN  Outcome: Progressing  Flowsheets  Taken 12/23/2023 2000 by Charlene Villeda RN  Free From Fall Injury:   Instruct family/caregiver on patient safety   Based on caregiver fall risk screen, instruct family/caregiver to ask for assistance with transferring infant if caregiver noted to have fall risk factors  Taken 12/23/2023 1304 by Altagracia Nieves RN  Free From Fall Injury: Instruct family/caregiver on patient safety     Problem: Discharge Planning  Goal: Discharge to home or other facility with appropriate resources  12/24/2023 1021 by Nicole Flood RN  Outcome: Progressing  Flowsheets (Taken 12/24/2023 0800)  Discharge to home or other facility with appropriate resources: Identify barriers to discharge with patient and caregiver  12/24/2023 0142 by Charlene Villeda RN  Outcome: Progressing  Flowsheets (Taken 12/23/2023 2000)  Discharge to home or other facility with appropriate resources:   Identify barriers to discharge with patient and caregiver   Arrange for needed discharge resources and transportation as appropriate   Identify discharge learning needs (meds, wound care, etc)   Refer to discharge planning if patient needs post-hospital services based on physician order or complex needs related to functional status, 
  Problem: Skin/Tissue Integrity  Goal: Absence of new skin breakdown  Description: 1.  Monitor for areas of redness and/or skin breakdown  2.  Assess vascular access sites hourly  3.  Every 4-6 hours minimum:  Change oxygen saturation probe site  4.  Every 4-6 hours:  If on nasal continuous positive airway pressure, respiratory therapy assess nares and determine need for appliance change or resting period.  12/26/2023 2038 by Johnna Howell RN  Outcome: Progressing     Problem: Safety - Adult  Goal: Free from fall injury  12/26/2023 2038 by Johnna Howell, RN  Outcome: Progressing    Problem: Discharge Planning  Goal: Discharge to home or other facility with appropriate resources  Outcome: Progressing     Problem: Chronic Conditions and Co-morbidities  Goal: Patient's chronic conditions and co-morbidity symptoms are monitored and maintained or improved  12/26/2023 2038 by Johnna Howell, RN  Outcome: Progressing            
  Problem: Skin/Tissue Integrity  Goal: Absence of new skin breakdown  Description: 1.  Monitor for areas of redness and/or skin breakdown  2.  Assess vascular access sites hourly  3.  Every 4-6 hours minimum:  Change oxygen saturation probe site  4.  Every 4-6 hours:  If on nasal continuous positive airway pressure, respiratory therapy assess nares and determine need for appliance change or resting period.  12/31/2023 0009 by Azra Smith RN  Outcome: Progressing     Problem: Safety - Adult  Goal: Free from fall injury  12/31/2023 0009 by Azra Smith RN  Outcome: Progressing     Problem: Discharge Planning  Goal: Discharge to home or other facility with appropriate resources  12/31/2023 0009 by Azra Smith RN  Outcome: Progressing  Flowsheets (Taken 12/30/2023 2000)  Discharge to home or other facility with appropriate resources: Identify barriers to discharge with patient and caregiver     Problem: Chronic Conditions and Co-morbidities  Goal: Patient's chronic conditions and co-morbidity symptoms are monitored and maintained or improved  12/31/2023 0009 by Azra Smith, RN  Outcome: Progressing  Flowsheets (Taken 12/30/2023 2000)  Care Plan - Patient's Chronic Conditions and Co-Morbidity Symptoms are Monitored and Maintained or Improved: Monitor and assess patient's chronic conditions and comorbid symptoms for stability, deterioration, or improvement     
  Problem: Skin/Tissue Integrity  Goal: Absence of new skin breakdown  Description: 1.  Monitor for areas of redness and/or skin breakdown  2.  Assess vascular access sites hourly  3.  Every 4-6 hours minimum:  Change oxygen saturation probe site  4.  Every 4-6 hours:  If on nasal continuous positive airway pressure, respiratory therapy assess nares and determine need for appliance change or resting period.  12/31/2023 2218 by Azra Smith RN  Outcome: Progressing     Problem: Safety - Adult  Goal: Free from fall injury  12/31/2023 2218 by Azra Smith RN  Outcome: Progressing     Problem: Discharge Planning  Goal: Discharge to home or other facility with appropriate resources  12/31/2023 2218 by Azra Smith RN  Outcome: Progressing  Flowsheets (Taken 12/31/2023 2000)  Discharge to home or other facility with appropriate resources: Identify barriers to discharge with patient and caregiver     Problem: Chronic Conditions and Co-morbidities  Goal: Patient's chronic conditions and co-morbidity symptoms are monitored and maintained or improved  12/31/2023 2218 by Azra Smith RN  Outcome: Progressing  Flowsheets (Taken 12/31/2023 2000)  Care Plan - Patient's Chronic Conditions and Co-Morbidity Symptoms are Monitored and Maintained or Improved: Monitor and assess patient's chronic conditions and comorbid symptoms for stability, deterioration, or improvement     Problem: Pain  Goal: Verbalizes/displays adequate comfort level or baseline comfort level  12/31/2023 2218 by Azra Smith RN  Outcome: Progressing     
  Problem: Skin/Tissue Integrity  Goal: Absence of new skin breakdown  Description: 1.  Monitor for areas of redness and/or skin breakdown  2.  Assess vascular access sites hourly  3.  Every 4-6 hours minimum:  Change oxygen saturation probe site  4.  Every 4-6 hours:  If on nasal continuous positive airway pressure, respiratory therapy assess nares and determine need for appliance change or resting period.  Outcome: Progressing     Problem: Physical Therapy - Adult  Goal: By Discharge: Performs mobility at highest level of function for planned discharge setting.  See evaluation for individualized goals.  Description: FUNCTIONAL STATUS PRIOR TO ADMISSION: Patient was independent and active without use of DME. Pt with dementia. Per chart, pt with inability to walk past several days.    HOME SUPPORT PRIOR TO ADMISSION: The patient lived with .    Physical Therapy Goals  Initiated 12/22/2023  1.  Patient will move from supine to sit and sit to supine in bed with minimal assistance within 7 day(s).    2.  Patient will perform sit to stand with moderate assistance within 7 day(s).  3.  Patient will transfer from bed to chair and chair to bed with moderate assistance using the least restrictive device within 7 day(s).  4.  Patient will ambulate with moderate assistance for 50 feet with the least restrictive device within 7 day(s).   5.  Patient will ascend/descend 2 stairs with one handrail(s) with moderate assistance within 7 day(s).    12/27/2023 1052 by Eloisa Newby, PT  Outcome: Progressing     Problem: Safety - Adult  Goal: Free from fall injury  Outcome: Progressing     Problem: Discharge Planning  Goal: Discharge to home or other facility with appropriate resources  Outcome: Progressing     Problem: Chronic Conditions and Co-morbidities  Goal: Patient's chronic conditions and co-morbidity symptoms are monitored and maintained or improved  Outcome: Progressing     
  Problem: Skin/Tissue Integrity  Goal: Absence of new skin breakdown  Description: 1.  Monitor for areas of redness and/or skin breakdown  2.  Assess vascular access sites hourly  3.  Every 4-6 hours minimum:  Change oxygen saturation probe site  4.  Every 4-6 hours:  If on nasal continuous positive airway pressure, respiratory therapy assess nares and determine need for appliance change or resting period.  Outcome: Progressing     Problem: Safety - Adult  Goal: Free from fall injury  Outcome: Progressing     Problem: Discharge Planning  Goal: Discharge to home or other facility with appropriate resources  Outcome: Progressing     Problem: Chronic Conditions and Co-morbidities  Goal: Patient's chronic conditions and co-morbidity symptoms are monitored and maintained or improved  Outcome: Progressing     
  Problem: Skin/Tissue Integrity  Goal: Absence of new skin breakdown  Description: 1.  Monitor for areas of redness and/or skin breakdown  2.  Assess vascular access sites hourly  3.  Every 4-6 hours minimum:  Change oxygen saturation probe site  4.  Every 4-6 hours:  If on nasal continuous positive airway pressure, respiratory therapy assess nares and determine need for appliance change or resting period.  Outcome: Progressing     Problem: Safety - Adult  Goal: Free from fall injury  Outcome: Progressing     Problem: Discharge Planning  Goal: Discharge to home or other facility with appropriate resources  Outcome: Progressing  Flowsheets (Taken 1/1/2024 2000)  Discharge to home or other facility with appropriate resources: Identify barriers to discharge with patient and caregiver     Problem: Chronic Conditions and Co-morbidities  Goal: Patient's chronic conditions and co-morbidity symptoms are monitored and maintained or improved  Outcome: Progressing  Flowsheets (Taken 1/1/2024 2000)  Care Plan - Patient's Chronic Conditions and Co-Morbidity Symptoms are Monitored and Maintained or Improved: Monitor and assess patient's chronic conditions and comorbid symptoms for stability, deterioration, or improvement     Problem: Pain  Goal: Verbalizes/displays adequate comfort level or baseline comfort level  Outcome: Progressing     
  Problem: Skin/Tissue Integrity  Goal: Absence of new skin breakdown  Description: 1.  Monitor for areas of redness and/or skin breakdown  2.  Assess vascular access sites hourly  3.  Every 4-6 hours minimum:  Change oxygen saturation probe site  4.  Every 4-6 hours:  If on nasal continuous positive airway pressure, respiratory therapy assess nares and determine need for appliance change or resting period.  Outcome: Progressing     Problem: Safety - Adult  Goal: Free from fall injury  Outcome: Progressing     Problem: Discharge Planning  Goal: Discharge to home or other facility with appropriate resources  Outcome: Progressing  Flowsheets (Taken 12/25/2023 0800)  Discharge to home or other facility with appropriate resources: Identify barriers to discharge with patient and caregiver     Problem: Chronic Conditions and Co-morbidities  Goal: Patient's chronic conditions and co-morbidity symptoms are monitored and maintained or improved  Outcome: Progressing  Flowsheets (Taken 12/25/2023 0800)  Care Plan - Patient's Chronic Conditions and Co-Morbidity Symptoms are Monitored and Maintained or Improved: Monitor and assess patient's chronic conditions and comorbid symptoms for stability, deterioration, or improvement     
  Problem: Skin/Tissue Integrity  Goal: Absence of new skin breakdown  Description: 1.  Monitor for areas of redness and/or skin breakdown  2.  Assess vascular access sites hourly  3.  Every 4-6 hours minimum:  Change oxygen saturation probe site  4.  Every 4-6 hours:  If on nasal continuous positive airway pressure, respiratory therapy assess nares and determine need for appliance change or resting period.  Outcome: Progressing     Problem: Safety - Adult  Goal: Free from fall injury  Outcome: Progressing     Problem: Discharge Planning  Goal: Discharge to home or other facility with appropriate resources  Outcome: Progressing  Flowsheets (Taken 12/29/2023 2000)  Discharge to home or other facility with appropriate resources: Identify barriers to discharge with patient and caregiver     Problem: Chronic Conditions and Co-morbidities  Goal: Patient's chronic conditions and co-morbidity symptoms are monitored and maintained or improved  Outcome: Progressing  Flowsheets (Taken 12/29/2023 2000)  Care Plan - Patient's Chronic Conditions and Co-Morbidity Symptoms are Monitored and Maintained or Improved: Monitor and assess patient's chronic conditions and comorbid symptoms for stability, deterioration, or improvement     
  Problem: Skin/Tissue Integrity  Goal: Absence of new skin breakdown  Description: 1.  Monitor for areas of redness and/or skin breakdown  2.  Assess vascular access sites hourly  3.  Every 4-6 hours minimum:  Change oxygen saturation probe site  4.  Every 4-6 hours:  If on nasal continuous positive airway pressure, respiratory therapy assess nares and determine need for appliance change or resting period.  Outcome: Progressing     Problem: Safety - Adult  Goal: Free from fall injury  Outcome: Progressing     Problem: Discharge Planning  Goal: Discharge to home or other facility with appropriate resources  Outcome: Progressing  Flowsheets (Taken 12/30/2023 2000)  Discharge to home or other facility with appropriate resources: Identify barriers to discharge with patient and caregiver     Problem: Chronic Conditions and Co-morbidities  Goal: Patient's chronic conditions and co-morbidity symptoms are monitored and maintained or improved  Outcome: Progressing  Flowsheets (Taken 12/30/2023 2000)  Care Plan - Patient's Chronic Conditions and Co-Morbidity Symptoms are Monitored and Maintained or Improved: Monitor and assess patient's chronic conditions and comorbid symptoms for stability, deterioration, or improvement     
  Problem: Skin/Tissue Integrity  Goal: Absence of new skin breakdown  Description: 1.  Monitor for areas of redness and/or skin breakdown  2.  Assess vascular access sites hourly  3.  Every 4-6 hours minimum:  Change oxygen saturation probe site  4.  Every 4-6 hours:  If on nasal continuous positive airway pressure, respiratory therapy assess nares and determine need for appliance change or resting period.  Outcome: Progressing     Problem: Safety - Adult  Goal: Free from fall injury  Outcome: Progressing     Problem: Discharge Planning  Goal: Discharge to home or other facility with appropriate resources  Outcome: Progressing  Flowsheets (Taken 12/31/2023 2000)  Discharge to home or other facility with appropriate resources: Identify barriers to discharge with patient and caregiver     Problem: Chronic Conditions and Co-morbidities  Goal: Patient's chronic conditions and co-morbidity symptoms are monitored and maintained or improved  Outcome: Progressing  Flowsheets (Taken 12/31/2023 2000)  Care Plan - Patient's Chronic Conditions and Co-Morbidity Symptoms are Monitored and Maintained or Improved: Monitor and assess patient's chronic conditions and comorbid symptoms for stability, deterioration, or improvement     Problem: Pain  Goal: Verbalizes/displays adequate comfort level or baseline comfort level  Outcome: Progressing     
  Problem: Skin/Tissue Integrity  Goal: Absence of new skin breakdown  Description: 1.  Monitor for areas of redness and/or skin breakdown  2.  Assess vascular access sites hourly  3.  Every 4-6 hours minimum:  Change oxygen saturation probe site  4.  Every 4-6 hours:  If on nasal continuous positive airway pressure, respiratory therapy assess nares and determine need for appliance change or resting period.  Outcome: Progressing     Problem: Safety - Adult  Goal: Free from fall injury  Outcome: Progressing  Flowsheets (Taken 12/28/2023 0800)  Free From Fall Injury: Instruct family/caregiver on patient safety     Problem: Discharge Planning  Goal: Discharge to home or other facility with appropriate resources  Outcome: Progressing     Problem: Chronic Conditions and Co-morbidities  Goal: Patient's chronic conditions and co-morbidity symptoms are monitored and maintained or improved  Outcome: Progressing     
Speech LAnguage Pathology TREATMENT    Patient: Joya Zarate (89 y.o. female)  Date: 12/26/2023  Primary Diagnosis: Left-sided weakness [R53.1]  Weakness of left leg [R29.898]  Weakness of left arm [R29.898]  Acute CVA (cerebrovascular accident) (HCC) [I63.9]    Precautions: Aspiration, Fall Risk                  ASSESSMENT :  Therapy targeted dysphagia. Patient endorsed reduced appetite/interest in PO; per family at bedside, patient has had limited intake over course of the day. With encouragement, patient agreeable to PO trials of thin liquids and solids this session. Oral phase appeared functional given additional time for mastication. Pharyngeal phase revealed one instance of strong cough response following thin liquids via large bore straw sip. With small straw sip, no overt s/s of aspiration occurred. Discussed PO diet recommendations and swallow strategies with family members present at bedside. She will benefit from RD involvement. Will continue to follow.     Patient will benefit from skilled intervention to address the above impairments.     PLAN :  Recommendations and Planned Interventions:  Diet: Easy to chew and thin liquids (bread is okay)  Oral medications as tolerated  Aspiration precautions: Upright position, slow rate, small bites/sips  Oral care BID at a minimum  RD involvement    Acute SLP Services: Yes, SLP will continue to follow per plan of care.    Discharge Recommendations: Yes, recommend SLP treatment at next level of care     SUBJECTIVE:   Patient stated, “No, I'm okay” when initially offered PO trials.     OBJECTIVE:     Past Medical History:   Diagnosis Date    AR (allergic rhinitis)     GERD (gastroesophageal reflux disease)     Hypercholesterolemia     Insomnia 11/13/2013    Osteopenia 11/2010    repeat DEXA 2017; on Fosamax 35 mg weekly    Pre-diabetes     Unspecified vitamin D deficiency 2009    Urinary incontinence     mild     Past Surgical History:   Procedure Laterality Date 
12/23/2023 0800)  Discharge to home or other facility with appropriate resources: Identify barriers to discharge with patient and caregiver     Problem: Chronic Conditions and Co-morbidities  Goal: Patient's chronic conditions and co-morbidity symptoms are monitored and maintained or improved  12/24/2023 0142 by Charlene Villeda, RN  Outcome: Progressing  Flowsheets (Taken 12/23/2023 2000)  Care Plan - Patient's Chronic Conditions and Co-Morbidity Symptoms are Monitored and Maintained or Improved:   Monitor and assess patient's chronic conditions and comorbid symptoms for stability, deterioration, or improvement   Collaborate with multidisciplinary team to address chronic and comorbid conditions and prevent exacerbation or deterioration   Update acute care plan with appropriate goals if chronic or comorbid symptoms are exacerbated and prevent overall improvement and discharge  12/23/2023 1250 by Altagracia Nieves, RN  Outcome: Progressing  Flowsheets (Taken 12/23/2023 0800)  Care Plan - Patient's Chronic Conditions and Co-Morbidity Symptoms are Monitored and Maintained or Improved: Monitor and assess patient's chronic conditions and comorbid symptoms for stability, deterioration, or improvement     
DEXA 2017; on Fosamax 35 mg weekly    Pre-diabetes     Unspecified vitamin D deficiency 2009    Urinary incontinence     mild     Past Surgical History:   Procedure Laterality Date    ORTHOPEDIC SURGERY  10/99     R hip replacement >infected redone 4/00>10/00    ORTHOPEDIC SURGERY      LT KNEE ARTHROSCOPY    ORTHOPEDIC SURGERY  7/15/13    R knee replacement    TONSILLECTOMY AND ADENOIDECTOMY      age 7     Prior Level of Function/Home Situation:   Social/Functional History  Lives With: Spouse  Type of Home: House  Home Layout: Two level, Able to Live on Main level with bedroom/bathroom  Home Access: Level entry  Home Equipment: None  Has the patient had two or more falls in the past year or any fall with injury in the past year?: Unknown      Cognitive and Communication Status:  Neurologic State: Alert  Orientation Level: Oriented to person  Cognition: Follows commands    Dysphagia:  Oral Assessment:  Oral Motor   Labial: No impairment  Dentition: Natural  Oral Hygiene: Moist  Oral Hygiene Comments: wfl  Lingual: No impairment  Velum: Unable to visualize  Mandible: No impairment  P.O. Trials:  PO Trials  Neuromuscular Estim Used: No  Assessment Method(s): Observation  Patient Position: up in bed  Vocal Quality: No Impairment  Consistency Presented: Thin;Regular  How Presented: Self-fed/presented  Bolus Acceptance: No impairment  Bolus Formation/Control: No impairment  Type of Impairment: Delayed;Mastication  Propulsion: Delayed (# of seconds)  Oral Residue: Less than 10% of bolus  Aspiration Signs/Symptoms: None                 Respiratory Status/Airway:  Room air            Functional Oral Intake Scale (FOIS): 6--Total oral diet with multiple consistencies without special preparation, but with specific food limitations    After treatment:   Patient left in no apparent distress in bed, Call bell left within reach, and Nursing notified    COMMUNICATION/EDUCATION:   Patient was educated regarding her deficit(s) of 
Progressing  12/27/2023 2041 by Augustina Archer, RN  Outcome: Progressing     Problem: Chronic Conditions and Co-morbidities  Goal: Patient's chronic conditions and co-morbidity symptoms are monitored and maintained or improved  12/27/2023 2149 by Johnna Howell, RN  Outcome: Progressing  12/27/2023 2041 by Augustina Archer, RN  Outcome: Progressing     
Recovery after Stroke: Pt unable to follow commands to assess      Pain Ratin/10     Activity Tolerance:   Poor    After treatment:   Patient left in no apparent distress in bed and Call bell within reach    COMMUNICATION/EDUCATION:   The patient's plan of care was discussed with: physical therapist and registered nurse    Patient Education  Education Given To: Patient  Education Provided: Role of Therapy;Plan of Care;Fall Prevention Strategies  Education Method: Demonstration;Verbal  Barriers to Learning: Cognition  Education Outcome: Unable to verbalize;Unable to demonstrate understanding;Continued education needed    Thank you for this referral.  Kalyn Solis OT  Minutes: 20    Occupational Therapy Evaluation Charge Determination   History Examination Decision-Making   LOW Complexity : Brief history review  HIGH Complexity: 5 Performance deficits relating to physical, cognitive, or psychosocial skills that result in activity limitations and/or participation restrictions  HIGH Complexity: Patient presents with comorbidities that affect occupational performance.  Significant modifications of tasks or assistance (eg. physical or verbal) with assessment (s) is necessary to enable pt to complete evaluation   Based on the above components, the patient evaluation is determined to be of the following complexity level: Low

## 2024-01-02 NOTE — CARE COORDINATION
Transition of Care Plan:    Home with Family; [facility]    Transport: [facility] 1400    RUR: 12%  Prior Level of Functioning: Requires assist  Disposition: hospice   Follow up appointments: None  DME needed: defer to Hospice  Transportation at discharge: BLS  IM/IMM Medicare/ letter given: 12/27  Caregiver Contact: Evelyn Plunkett [phone]   Discharge Caregiver contacted prior to discharge? Yes  Care Conference needed? No  Barriers to discharge: None    Plan for Pt to go home with family and [facility] today.     [facility] transport scheduled for 1400.    MANDA Bedoya (Ally).

## 2024-01-02 NOTE — DISCHARGE INSTRUCTIONS
Discharge Instructions       PATIENT ID: Joya Zarate  MRN: [id number]   YOB: 1934    DATE OF ADMISSION: 12/21/2023   DATE OF DISCHARGE: 1/2/2024    PRIMARY CARE PROVIDER: Harpreet Zuniga     ATTENDING PHYSICIAN: Tarah Dawson MD   DISCHARGING PROVIDER: KYLE Bernal NP    To contact this individual call [phone] and ask the  to page.   If unavailable ask to be transferred the Adult Hospitalist Department.    DISCHARGE DIAGNOSES acute CVA    CONSULTATIONS: Neurology, Hospice    PROCEDURES/SURGERIES: * No surgery found *    PENDING TEST RESULTS:   At the time of discharge the following test results are still pending: none    FOLLOW UP APPOINTMENTS:    Follow-up Information       Follow up With Specialties Details Why Contact [facility]  Follow up today              ADDITIONAL CARE RECOMMENDATIONS:   The remainder of care per hospice team. Comfort medications were sent to [facility] on 1/1 by hospice team.    DIET:  easy to chew    ACTIVITY: activity as tolerated    WOUND CARE: none    EQUIPMENT needed: none      DISCHARGE MEDICATIONS:   See Medication Reconciliation Form    It is important that you take the medication exactly as they are prescribed.   Keep your medication in the bottles provided by the pharmacist and keep a list of the medication names, dosages, and times to be taken in your wallet.   Do not take other medications without consulting your doctor.       NOTIFY YOUR PHYSICIAN FOR ANY OF THE FOLLOWING:   Fever over 101 degrees for 24 hours.   Chest pain, shortness of breath, fever, chills, nausea, vomiting, diarrhea, change in mentation, falling, weakness, bleeding. Severe pain or pain not relieved by medications.  Or, any other signs or symptoms that you may have questions about.      DISPOSITION:    Home With:   OT  PT  HH  RN       SNF/Inpatient Rehab/LTAC    Independent/assisted living   X Hospice    Other:     CDMP Checked:   Yes X

## 2024-01-02 NOTE — HOSPICE
Status (KPS) or Palliative Performance Scale (PPS) of 40% or less;  ___x_____  2. Inability to maintain hydration and caloric intake with one of the following:        ___x_____  a. Weight loss >10% in the last 6 months or >7.5% in the last 3 months;        ____x____  b. Serum albumin         ________  c. Current history of pulmonary aspiration not responsive to speech language                                 pathology intervention;                       ________  d. Sequential calorie counts documenting inadequate caloric/fluid intake;                       ________  e. Dysphagia severe enough to prevent the patient from receiving food and fluids                   necessary to sustain life, in a patient who declines or does not receive artificial                                 nutrition and hydration          SPIRITUAL/Social/Emotional/psych:     Dr. Cunha contacted, agrees to serve as attending provider for hospice and provided verbal certification of terminal illness with prognosis of 6 months or less life expectancy. Orders for hospice admission, medications and plan of treatment received. Medication reconciliation completed.        Currently this patient has:  Supplemental O2:   Reyna Catheter:               MEDS:  I have reviewed the patient's medication list with MD and identified the following:  Nonformulary medications: n/a  Unrelated medications: n/a     IDT communication to include MD, SN, SW, CH and support team.

## 2024-01-02 NOTE — DISCHARGE SUMMARY
predniSONE 10 MG tablet  Commonly known as: DELTASONE     rivastigmine 4.6 MG/24HR  Commonly known as: EXELON     vitamin D 1.25 MG (94389 UT) Caps capsule  Commonly known as: ERGOCALCIFEROL               Where to Get Your Medications        Information about where to get these medications is not yet available    Ask your nurse or doctor about these medications  HYDROmorphone 1 MG/ML Liqd oral solution  LORazepam 2 MG/ML concentrated solution           NOTIFY YOUR PHYSICIAN FOR ANY OF THE FOLLOWING:   Fever over 101 degrees for 24 hours.   Chest pain, shortness of breath, fever, chills, nausea, vomiting, diarrhea, change in mentation, falling, weakness, bleeding. Severe pain or pain not relieved by medications.  Or, any other signs or symptoms that you may have questions about.    DISPOSITION:    Home With:   OT  PT  HH  RN       Long term SNF/Inpatient Rehab    Independent/assisted living   X Hospice    Other:       PATIENT CONDITION AT DISCHARGE:     Functional status    Poor    X Deconditioned     Independent      Cognition     Lucid     Forgetful    X Dementia      Catheters/lines (plus indication)    Reyna     PICC     PEG    X None      Code status     Full code    X DNR      PHYSICAL EXAMINATION AT DISCHARGE:  Patient seen and examined, resting comfortably in bed. Awakens easily to voice, oriented to self only. Reports that pain is controlled.    General : drowsy, awakens easily, no acute distress  HEENT: PEERL, EOMI, moist mucus membrane  Neck: supple, no JVD, no meningeal signs  Chest: Clear to auscultation bilaterally, on 2 L NC   CVS: S1 S2 heard, capillary refill less than 2 seconds  Abd: soft/non tender, non distended, BS physiological  Ext: no clubbing, no cyanosis, no edema, brisk 2+ DP pulses  Neuro/Psych: pleasant mood and affect, oriented to self only, left-sided weakness noted  Skin: warm     CHRONIC MEDICAL DIAGNOSES:  Principal Problem:    Left-sided weakness  Active Problems:    Acute CVA

## 2024-01-02 NOTE — PROGRESS NOTES
Hospitalist Progress Note  Estelle Fitzpatrick MD  Answering service: [phone] OR [phone] from in house phone        Date of Service:  2023  NAME:  Joya Zarate  :  1934  MRN:  [id number]      Admission Summary:     Joya Zarate is an 89-year-old female with a past medical history of dementia, GERD, dyslipidemia, osteoporosis who presents to the hospital with her family having concerns of left-sided weakness.  After discussion with her son, full medical history obtained.  He identifies that she has been declining over the last year significantly as it relates to mobility and memory.  She is seen outpatient by a neurologist regularly who has been unable to say definitively what is causing her decline aside from widespread severe cerebral atrophy.  He also reports intermittent \"spells\" where the patient looks to the right side, loses consciousness and is virtually unarousable for approximately 30 minutes.  After she \"comes to\" he reports that she has a period of 24 to 48 hours where she is recovering and \"out of it\".  She has never been worked up for epilepsy or seizures.  Over the last month he describes increased fatigue, general malaise, decreased p.o. intake.  Over the last 3 days they have noticed slurred speech and left-sided weakness.  Of note the patient lives alone near family who reports checking on her twice daily.  She is being admitted for stroke workup.       Interval history / Subjective:     Unable to obtain history from patient.  Discussed plan of care with her daughter in law who is present at the bedside. EEG ordered by neurology today. PT/OT reccs for SNF,   I requested CM see patient today and give family list of choices.      Assessment & Plan:     Acute CVA:  Physical deconditioning  - MRI with small focus of acute infarction at the superior jorge on the right and a punctate infarct at the left 
                                                                                                Hospitalist Progress Note  Estelle Fitzpatrick MD  Answering service: [phone] OR [phone] from in house phone        Date of Service:  2023  NAME:  Joya Zarate  :  1934  MRN:  [id number]      Admission Summary:     Joya Zarate is an 89-year-old female with a past medical history of dementia, GERD, dyslipidemia, osteoporosis who presents to the hospital with her family having concerns of left-sided weakness.  After discussion with her son, full medical history obtained.  He identifies that she has been declining over the last year significantly as it relates to mobility and memory.  She is seen outpatient by a neurologist regularly who has been unable to say definitively what is causing her decline aside from widespread severe cerebral atrophy.  He also reports intermittent \"spells\" where the patient looks to the right side, loses consciousness and is virtually unarousable for approximately 30 minutes.  After she \"comes to\" he reports that she has a period of 24 to 48 hours where she is recovering and \"out of it\".  She has never been worked up for epilepsy or seizures.  Over the last month he describes increased fatigue, general malaise, decreased p.o. intake.  Over the last 3 days they have noticed slurred speech and left-sided weakness.  Of note the patient lives alone near family who reports checking on her twice daily.  She is being admitted for stroke workup.       Interval history / Subjective:     Unable to obtain history from patient.  Discussed plan of care with her daughter in law who is present at the bedside. EEG ordered by neurology today. PT/OT reccs for SNF,   I requested CM see patient today and give family list of choices.      Assessment & Plan:     Acute CVA:  Physical deconditioning  - MRI with small focus of acute infarction at the superior jorge on the right and a punctate infarct at the left 
                                                                                                Hospitalist Progress Note  KYLE Bernal NP  Answering service: [phone] OR [phone] from in house phone        Date of Service:  2024  NAME:  Joya Zarate  :  1934  MRN:  [id number]      Admission Summary:   Joya Zarate is an 89-year-old female with a past medical history of dementia, GERD, dyslipidemia, osteoporosis who presents to the hospital with her family having concerns of left-sided weakness.  After discussion with her son, full medical history obtained.  He identifies that she has been declining over the last year significantly as it relates to mobility and memory.  She is seen outpatient by a neurologist regularly who has been unable to say definitively what is causing her decline aside from widespread severe cerebral atrophy.  He also reports intermittent \"spells\" where the patient looks to the right side, loses consciousness and is virtually unarousable for approximately 30 minutes.  After she \"comes to\" he reports that she has a period of 24 to 48 hours where she is recovering and \"out of it\".  She has never been worked up for epilepsy or seizures.  Over the last month he describes increased fatigue, general malaise, decreased p.o. intake.  Over the last 3 days they have noticed slurred speech and left-sided weakness.  Of note the patient lives alone near family who reports checking on her twice daily.  She is being admitted for stroke workup.       Interval history / Subjective:   Patient seen and examined, lying in bed on 2 L NC. Minimally interactive, but appears to be resting comfortably.    Discharge planned for 1/2 at 2 PM for home with hospice. Reyna to remain in place at time of discharge.     Assessment & Plan:     Acute CVA  Physical deconditioning  - MRI with small focus of acute infarction at the superior jorge on the right and a punctate infarct at the left frontal 
                                                                                                Hospitalist Progress Note  KYLE Pereira NP  Answering service: [phone] OR [phone] from in house phone        Date of Service:  2023  NAME:  Joya Zarate  :  1934  MRN:  [id number]      Admission Summary:     Jyoa Zarate is an 89-year-old female with a past medical history of dementia, GERD, dyslipidemia, osteoporosis who presents to the hospital with her family having concerns of left-sided weakness.  After discussion with her son, full medical history obtained.  He identifies that she has been declining over the last year significantly as it relates to mobility and memory.  She is seen outpatient by a neurologist regularly who has been unable to say definitively what is causing her decline aside from widespread severe cerebral atrophy.  He also reports intermittent \"spells\" where the patient looks to the right side, loses consciousness and is virtually unarousable for approximately 30 minutes.  After she \"comes to\" he reports that she has a period of 24 to 48 hours where she is recovering and \"out of it\".  She has never been worked up for epilepsy or seizures.  Over the last month he describes increased fatigue, general malaise, decreased p.o. intake.  Over the last 3 days they have noticed slurred speech and left-sided weakness.  Of note the patient lives alone near family who reports checking on her twice daily.  She is being admitted for stroke workup.       Interval history / Subjective:     Unable to obtain history from patient.  Discussed plan of care with her daughter in law who is present at the bedside. EEG ordered by neurology today. PT/OT reccs for SNF,   I requested CM see patient today and give family list of choices.      Assessment & Plan:     Acute CVA:  Physical deconditioning  - MRI with small focus of acute infarction at the superior jorge on the right and a punctate 
                                                                                                Hospitalist Progress Note  KYLE Pereira NP  Answering service: [phone] OR [phone] from in house phone        Date of Service:  2023  NAME:  Joya Zarate  :  1934  MRN:  [id number]      Admission Summary:     Joya Zarate is an 89-year-old female with a past medical history of dementia, GERD, dyslipidemia, osteoporosis who presents to the hospital with her family having concerns of left-sided weakness.  After discussion with her son, full medical history obtained.  He identifies that she has been declining over the last year significantly as it relates to mobility and memory.  She is seen outpatient by a neurologist regularly who has been unable to say definitively what is causing her decline aside from widespread severe cerebral atrophy.  He also reports intermittent \"spells\" where the patient looks to the right side, loses consciousness and is virtually unarousable for approximately 30 minutes.  After she \"comes to\" he reports that she has a period of 24 to 48 hours where she is recovering and \"out of it\".  She has never been worked up for epilepsy or seizures.  Over the last month he describes increased fatigue, general malaise, decreased p.o. intake.  Over the last 3 days they have noticed slurred speech and left-sided weakness.  Of note the patient lives alone near family who reports checking on her twice daily.  She is being admitted for stroke workup.       Interval history / Subjective:     Unable to obtain history from patient.  She is lethargic on exam.  No acute events overnight.     Assessment & Plan:     Acute CVA:  Physical deconditioning  - MRI with small focus of acute infarction at the superior jorge on the right and a punctate infarct at the left frontal cortex  - Severe chronic microvascular ischemic changes and moderate to severe cerebral atrophy  - Echo pending, lipid 
                                                                                                Hospitalist Progress Note  KYLE Preciado NP  Answering service: [phone] OR [phone] from in house phone        Date of Service:  2023  NAME:  Joya Zarate  :  1934  MRN:  [id number]      Admission Summary:     Joya Zarate is an 89-year-old female with a past medical history of dementia, GERD, dyslipidemia, osteoporosis who presents to the hospital with her family having concerns of left-sided weakness.  After discussion with her son, full medical history obtained.  He identifies that she has been declining over the last year significantly as it relates to mobility and memory.  She is seen outpatient by a neurologist regularly who has been unable to say definitively what is causing her decline aside from widespread severe cerebral atrophy.  He also reports intermittent \"spells\" where the patient looks to the right side, loses consciousness and is virtually unarousable for approximately 30 minutes.  After she \"comes to\" he reports that she has a period of 24 to 48 hours where she is recovering and \"out of it\".  She has never been worked up for epilepsy or seizures.  Over the last month he describes increased fatigue, general malaise, decreased p.o. intake.  Over the last 3 days they have noticed slurred speech and left-sided weakness.  Of note the patient lives alone near family who reports checking on her twice daily.  She is being admitted for stroke workup.       Interval history / Subjective:     I saw the patient this morning on rounds.  No complaints.  Son was at the bedside at the moment of the encounter, updated at that time       Assessment & Plan:     Acute CVA:  Physical deconditioning  MRI with small focus of acute infarction at the superior jorge on the right and a punctate infarct at the left frontal cortex   Severe chronic microvascular ischemic changes and moderate to severe 
                                                                                                Hospitalist Progress Note  KYLE Preciado NP  Answering service: [phone] OR [phone] from in house phone        Date of Service:  2023  NAME:  Joya Zarate  :  1934  MRN:  [id number]      Admission Summary:     Joya Zarate is an 89-year-old female with a past medical history of dementia, GERD, dyslipidemia, osteoporosis who presents to the hospital with her family having concerns of left-sided weakness.  After discussion with her son, full medical history obtained.  He identifies that she has been declining over the last year significantly as it relates to mobility and memory.  She is seen outpatient by a neurologist regularly who has been unable to say definitively what is causing her decline aside from widespread severe cerebral atrophy.  He also reports intermittent \"spells\" where the patient looks to the right side, loses consciousness and is virtually unarousable for approximately 30 minutes.  After she \"comes to\" he reports that she has a period of 24 to 48 hours where she is recovering and \"out of it\".  She has never been worked up for epilepsy or seizures.  Over the last month he describes increased fatigue, general malaise, decreased p.o. intake.  Over the last 3 days they have noticed slurred speech and left-sided weakness.  Of note the patient lives alone near family who reports checking on her twice daily.  She is being admitted for stroke workup.       Interval history / Subjective:       I saw the patient this morning on rounds.  No complaints at the moment of the encounter       Assessment & Plan:     Acute CVA:  Physical deconditioning  MRI with small focus of acute infarction at the superior jorge on the right and a punctate infarct at the left frontal cortex   Severe chronic microvascular ischemic changes and moderate to severe cerebral atrophy  Echo with no PFO  A1c 6.1 and LDL 
                                                                                                Hospitalist Progress Note  KYLE Preciado NP  Answering service: [phone] OR [phone] from in house phone        Date of Service:  2023  NAME:  Joya Zarate  :  1934  MRN:  [id number]      Admission Summary:     Joya Zarate is an 89-year-old female with a past medical history of dementia, GERD, dyslipidemia, osteoporosis who presents to the hospital with her family having concerns of left-sided weakness.  After discussion with her son, full medical history obtained.  He identifies that she has been declining over the last year significantly as it relates to mobility and memory.  She is seen outpatient by a neurologist regularly who has been unable to say definitively what is causing her decline aside from widespread severe cerebral atrophy.  He also reports intermittent \"spells\" where the patient looks to the right side, loses consciousness and is virtually unarousable for approximately 30 minutes.  After she \"comes to\" he reports that she has a period of 24 to 48 hours where she is recovering and \"out of it\".  She has never been worked up for epilepsy or seizures.  Over the last month he describes increased fatigue, general malaise, decreased p.o. intake.  Over the last 3 days they have noticed slurred speech and left-sided weakness.  Of note the patient lives alone near family who reports checking on her twice daily.  She is being admitted for stroke workup.       Interval history / Subjective:     I saw the patient this morning on rounds.  No complaints this morning.  She denies pain, did not appear to have nonverbal cues indicating pain       Assessment & Plan:     Acute CVA:  Physical deconditioning  MRI with small focus of acute infarction at the superior jorge on the right and a punctate infarct at the left frontal cortex   Severe chronic microvascular ischemic changes and moderate to 
  [facility]: [facility]    Heike NATALYNegrita Dimas, MSHA, CNRN, ACNP-BC  [facility]          Name:   Joya Zarate   Medical record #: [id number]  Admission Date: 12/21/2023   Reason for Consult:  stroke    Subjective/Objective:   Overnight events:      No acute events overnight, EEG in progress.  Family at bedside reports waxing and waning symptoms anxious for transfer to inpatient rehab.                Care Plan discussed with:  Patient x   Family x   RN    Care Manager    Primary Team Provider    Consultant/Specialist        Impression/ Plan:      1.  Acute Ischemic Stroke:    ASA 81 mg  Neurochecks:  Every 4 hours  BP Goal: Less than 140  Routine EEG pending     Stroke work up  A1C: 6.1  LDL:  173.6, atorvastatin 80 mg started.  TTE:  EF 60-65% with normal wall motion  MRI: acute ischemic stroke in right jorge and left cerebral cortex  Carotid vascular imaging:  No significant stenosis on carotid duplex    Risk factors for stroke include:  Obesity, HTN, HLD, physical inactivity  Discussed with patient    Discussed signs/symptoms of stroke and when to call 911    Thank you for allowing the Neurology Service the pleasure of participating in the care of your patient.  Will return if EEG is abnormal, please call back with questions.     Physical Exam    Patient Vitals for the past 12 hrs:   Temp Pulse Resp BP SpO2   12/26/23 0941 97.4 °F (36.3 °C) 92 20 138/85 --   12/26/23 0600 97.8 °F (36.6 °C) 88 19 132/75 96 %   12/26/23 0145 97.4 °F (36.3 °C) 86 19 (!) 121/59 --          NEUROLOGICAL EXAM:       General: Opens eyes to voice, cooperative, no acute distress  Mental Status: Oriented to place (hospital) and person (Joya Crow), not year, does not remember that yesterday was Karma. Inattentive. No aphasia. Full fund of knowledge (able to name glasses and pen). Normal recent and remote memory-able to identify daughter and son in law.      Cranial Nerves:   Visual Fields:  normal in all quadrants 
  Follow up visit with patient. She was awake but unable to engage the conversation in any meaningful manner. Her son-in-law Sudarshan was at bedside. Sudarshan remembers me from a previous visit. He and his wife Eli (the patient's daughter) are taking the patient home with hospice support.     Chaplain Sarah, MDiv, MS, BCC  
  Palliative Medicine   [place] [phone] (COPE)        [facility] [phone] (COPE)      Palliative Medicine consultation received and appreciated, chart reviewed- per attending MD note from 12/27, family is interested in hospice services, noted that they have a family meeting with hospice today at 12:00 p.m.     Will hold Palliative Medicine consultation at this time, given that family is interested in talking to hospice. Please call if we can be further support in the patient's care.     Addendum: Noted hospice met w/ family and plan is for hospice services- goals are clear, Palliative will cancel consultation.     Thank you for including Palliative Medicine in the care of Joya Torres LCSW     
  Physician Progress Note      PATIENT:               CARMEN RODRIGUEZ  CSN #:                  [id number]  :                       1934  ADMIT DATE:       2023 7:39 PM  DISCH DATE:  RESPONDING  PROVIDER #:        Estelle Fitzpatrick MD          QUERY TEXT:    Pt admitted with CVA. Pt noted to have left sided weakness . If possible,   please document in progress notes and discharge summary the relationship, if   any, between left sided weakness and CVA.    The medical record reflects the following:  Risk Factors: CVA  Clinical Indicators:  ED  On further discussion with patient's family at bedside, they noted that over   the last 1 to 2 days he has been unable to use her left arm or leg due to   weakness    Treatment: neurology consult, PT, OT    Thank you  Tiffany Lambert RN, CDI. CCDS, CRCR  Certified  Clinical Documentation   O: [phone]  [email or web address]  I can also be reached by perfect serve  Options provided:  -- left sided weakness due to CVA  -- left sided hemiparesis due to CVA  -- Other - I will add my own diagnosis  -- Disagree - Not applicable / Not valid  -- Disagree - Clinically unable to determine / Unknown  -- Refer to Clinical Documentation Reviewer    PROVIDER RESPONSE TEXT:    This patient has left sided weakness due to CVA.    Query created by: Tiffany Lambert on 2023 9:13 AM      Electronically signed by:  Estelle Fitzpatrick MD 2023 3:12 PM          
[facility]  [facility]  [phone]    Hospice Nursing PRE-Admission   Discharge Summary  Patient Name: Joya Zarate  YOB: 1934  Age: 89 y.o.       Date of PLANNED Hospice Admission: 2023  Hospice Attending: Dr. Estevan Cunha  Primary Care Physician: Harpreet Zuniga MD     Home Hospice Address:  [address] [place] [address]    Primary Contact and Phone:  Eli Lorenz daughter: [phone]  Sudarshan Lorenz, son: [phone]      ADVANCE CARE PLANNING    Code Status: DNR  Durable DNR: [x]  Yes  []  No      2023    12:38 PM   Demographics   Marital Status        Adventism: Gnosticist   Home: D    HOSPICE SUMMARY     Verbal CTI of terminal diagnosis with life expectancy of 6 months or less received from: Dr Cunha    For the Hospice Diagnosis of: CVA    NCD: assess on admission      CLINICAL INFORMATION   Allergies:   Allergies   Allergen Reactions    Morphine Nausea And Vomiting    Gentamicin Other (See Comments)    Penicillins Other (See Comments)     childhood    Tetracycline Other (See Comments)     Change of mental status    Codeine Nausea And Vomiting     GI upset    Hydrocodone Nausea And Vomiting    Iodine Rash     fever    Tramadol Nausea And Vomiting         Currently this patient has:     [x] Reyna Catheter : to be placed prior to discharge       Does patient have an AICD device:   [x] No             COVID Screening: Not tested      ASSESSMENT & PLAN     1. Symptom Issues Identified: weakness, dysphagia, poor appetite, pain    2. Spiritual Issues  Identified: assess on admission     3.  Psych/ Social/ Emotional Issues Identified: returning to her home, son and dtr will be taking time off work to care for her. May need respite in near future     Acuity Assessment Tool  CLI: 1  PSY: 1 SPI: 1  ENV: 1  OTH: 1         CARE COORDINATION           Hospice Consents: by D[employer] with daughter    2. DME Ordered/Company/Delivery 
[facility]  [facility]  [phone]    Nursing Note   Patient Name: Joya Zarate  YOB: 1934  Age: 89 y.o.    [facility] RN Note:  Chart reviewed. Spoke to nurse.    Assessed patient for inpatient criteria. No family present. Pt alert and awake, able to answer questions appropriately. PCT setting up patient w/ breakfast tray. SL medications appear to b effective in managing symptoms. VSS. At this time patient does not meet criteria for IP hospice- does not have needs that require an acute hospice setting. Meets criteria for ROUTINE level of care once discharge disposition can be determined.     Liaison will plan to reach out to daughter to discuss next steps, will notify CM of outcome    09:40 Spoke to daughter Eli and Sudarshan ESAY. They are aware patient does not meet IP criteria. Plan to DC home w/ [facility] tomorrow w/ 3pm hospice admission scheduled. DME to arrive today. Will need medical transport home. Will request that CM arrange for 2pm    PER FAMILY REQUEST PLEASE use [phone] as primary contact number for Nara Gonzalez, RN, BSN, CHPN  Clinical Nurse Liaison  [facility]  [phone] Mobile  [phone] Office   Available on Perfect Serve     
[facility]  [facility]  [phone]    Nursing Note   Patient Name: Joya Zarate  YOB: 1934  Age: 89 y.o.    [facility] RN Note:  Chart reviewed. Spoke to nurse.    Assessed patient for inpatient criteria. No family present. Pt receiving schedule IV Dilaudid for pain but continues to eat bites and sips off her tray. She refuses repositioning per RN. Pt is able to take SL comfort meds and would benefit from trial. Contacted provider to discuss.    16:45: Call to pt's only dtr Eli. She states pt was living with them prior to admission and would consider having her return with the support of hospice. She is aware that pt is not inpatient appropriate at this time but we will continue to assess.         Becky Davis, RN, [facility]  Hospice Nurse Liaison  [phone] Mobile  [phone] Office     
[facility]  [facility] Those in Need  [phone]    Patient Name: Joya Zarate  YOB: 1934  Age: 89 y.o.    [facility] RN Note:  Hospice consult noted. Chart reviewed. Plan of care discussed with patients nurse & care manager.   In to meet with patient, son and daughter.   Discussed Hospice philosophy, general plan of care, levels of care, services and on call procedures.  Family information packet provided & reviewed with family.  Patient is alert but confused. She is lethargic and weak with pain whenever she is touched or moved. Family shared her decline with me over the past 2 months. She is not eating and has been non-ambulatory for a couple of weeks.  Their focus is her comfort and they agree with hospice care. I spoke with Dr Cunha and got orders for comfort meds.  Hospice will place on wait list for [facility] as she may decline overnight and meet GIP criteria.  Liaison will re-assess tomorrow for GIP vs home hospice.   Dr Dawson made aware of the plan    Thank you for the opportunity to be of service to this patient.     Lupe Diego RN  Clinical Nurse Liaison  [facility]  [phone] Mobile  [phone] Office   Available on Perfect Serve    
Occupational Therapy:     Chart reviewed in prep for OT tx session. Noted, pt and family's decision to pursue hospice care (evaluating for GIP vs home) and confirmed with CM. Will complete orders at this time.     Thank you for the opportunity to be involved in Ms. Zarate's care.   Gladys Drake, LES, OTR/L    
Orders received, chart reviewed and patient evaluated by physical therapy. Pending progression with skilled acute physical therapy, recommend:  Therapy up to 5 days/week in Skilled nursing facility      Full evaluation to follow.     
[facility]  Good Help to Those in Need  [phone]     Patient Name: Joya Zarate  YOB: 1934  Age: 89 y.o.    [facility] RN Note:  patient is not GIP today, she is receiving low dose of IV dilaudid but is alert and able to answer simple questions although confused. Hospice to continue to monitor for decline over the weekend, if not GIP then family may wish to go home on Monday with hospice.     Thank you for the opportunity to be of service to this patient.    TARAN Wise, RN  Clinical Nurse Liaison  [facility]  [phone]  Office: [phone]  Available on Perfect Serve    
Patient without IV access, Provider aware.  I offered twice (12/25 and 12/26), she refuses.  
Physical Therapy  12/26/2023    Chart reviewed. Attempted to see pt this morning, however pt currently undergoing bedside testing. Will defer and follow as able/appropriate.     Khadra Means, PTA  
Physical Therapy Note    Noted, pt and family's decision to pursue hospice care (evaluating for GIP vs home) and confirmed with CM. Will complete orders at this time   
SLP Contact Note    Update: Note pt pursuing comfort measures/hospice. Will sign off.    Note family meeting planned for today at 12. Will await further discussion.      Geneva Garland M.Ed, CCC-SLP  Speech-Language Pathologist    
[facility]  Good Help to Those in Need  [phone]     Patient Name: Joya Zarate  YOB: 1934  Age: 89 y.o.    [facility] RN Note:  Hospice consult received, reviewing chart. Will follow up with Unit Nurse and Care Manager to discuss plan of care, patient status and discharge disposition within the hour.   Family meeting set up for 12 noon tomorrow.     Thank you for the opportunity to be of service to this patient.     Lupe Diego RN  Clinical Nurse Liaison  [facility]  [phone] Mobile  [phone] Office   Available on Perfect Serve    
Spiritual Care Assessment/Progress Note  [facility]    Name: Joya Zarate MRN: [id number]    Age: 89 y.o.     Sex: female   Language: English     Date: 2023            Total Time Calculated: 28 min              Spiritual Assessment begun in [facility] 6S NEURO-SCI TELE  Service Provided For:: Family  Referral/Consult From:: Nurse  Encounter Overview/Reason : Initial Encounter    Spiritual beliefs:      [] Involved in a arlyn tradition/spiritual practice:      [] Supported by a arlyn community:      [] Claims no spiritual orientation:      [] Seeking spiritual identity:           [] Adheres to an individual form of spirituality:      [x] Not able to assess:                Identified resources for coping and support system:   Support System: Children, Family members       [] Prayer                  [] Devotional reading               [] Music                  [] Guided Imagery     [] Pet visits                                        [] Other: (COMMENT)     Specific area/focus of visit   Encounter:    Crisis:    Spiritual/Emotional needs: Type: Emotional Distress  Ritual, Rites and Sacraments:    Grief, Loss, and Adjustments: Type: Anticipatory Grief  Ethics/Mediation:    Behavioral Health:    Palliative Care:    Advance Care Planning:      Visited patient at request of patient's nurse.  She was able to acknowledge my presence and give simple responses. She was unable to engage the conversation fully. Her daughter Eli Lorenz and son-in-law Sudarshan Lorenz were present. The patient's  is  and her two other children are  as well. Eli is her MPOA. Eli and Sudarshan are requesting that he be called as primary contact. They are also concerned that hospice may not be an option at this time.   Chaplain Sarah, Michelle, MS, BCC    
rEEG complete.  
  Abdomen:   Soft, non-tender. Bowel sounds normal. No masses,  No organomegaly.   Extremities: Extremities normal, atraumatic, no cyanosis or edema.   Pulses: 2+ and symmetric all extremities.   Skin: Skin color, texture, turgor normal. No rashes or lesions   Neurologic: Alert and oriented to self only CNII-XII grossly intact.  4/5 strength in left upper extremity , 3/5 strength in left lower extremity.  Range of motion evident in left lower extremity by moderate amount of knee pain           Data Review:    Review and/or order of clinical lab test  Review and/or order of tests in the radiology section of CPT  Review and/or order of tests in the medicine section of CPT    I have independently reviewed and interpreted patient's lab and all other diagnostic data    Notes reviewed from all clinical/nonclinical/nursing services involved in patient's clinical care. Care coordination discussions were held with appropriate clinical/nonclinical/ nursing providers based on care coordination needs.     Labs:     Recent Labs     12/25/23  0710   WBC 6.8   HGB 13.0   HCT 38.3          Recent Labs     12/25/23  0710      K 3.1*      CO2 22   BUN 12   MG 1.9       Recent Labs     12/25/23  0710   ALT 13   GLOB 3.3       No results for input(s): \"INR\", \"APTT\" in the last 72 hours.    Invalid input(s): \"PTP\"   No results for input(s): \"TIBC\", \"FERR\" in the last 72 hours.    Invalid input(s): \"FE\", \"PSAT\"   No results found for: \"FOL\", \"RBCF\"   No results for input(s): \"PH\", \"PCO2\", \"PO2\" in the last 72 hours.  No results for input(s): \"CPK\" in the last 72 hours.    Invalid input(s): \"CPKMB\", \"CKNDX\", \"TROIQ\"  Lab Results   Component Value Date/Time    CHOL 241 12/23/2023 06:03 AM    HDL 49 12/23/2023 06:03 AM     No results found for: \"GLUCPOC\"        Medications Reviewed:     Current Facility-Administered Medications   Medication Dose Route Frequency    atorvastatin (LIPITOR) tablet 80 mg  80 mg Oral Nightly    
12/23/2023 06:03 AM     No results found for: \"GLUCPOC\"        Medications Reviewed:     Current Facility-Administered Medications   Medication Dose Route Frequency    0.9% NaCl with KCl 40 mEq infusion   IntraVENous Continuous    atorvastatin (LIPITOR) tablet 80 mg  80 mg Oral Nightly    mirtazapine (REMERON) tablet 15 mg  15 mg Oral Nightly    aspirin chewable tablet 81 mg  81 mg Oral Daily    sodium chloride flush 0.9 % injection 5-40 mL  5-40 mL IntraVENous 2 times per day    sodium chloride flush 0.9 % injection 5-40 mL  5-40 mL IntraVENous PRN    0.9 % sodium chloride infusion   IntraVENous PRN    ondansetron (ZOFRAN-ODT) disintegrating tablet 4 mg  4 mg Oral Q8H PRN    Or    ondansetron (ZOFRAN) injection 4 mg  4 mg IntraVENous Q6H PRN    polyethylene glycol (GLYCOLAX) packet 17 g  17 g Oral Daily PRN    sodium chloride flush 0.9 % injection 5-40 mL  5-40 mL IntraVENous 2 times per day    sodium chloride flush 0.9 % injection 5-40 mL  5-40 mL IntraVENous PRN    0.9 % sodium chloride infusion   IntraVENous PRN    potassium chloride (KLOR-CON) extended release tablet 40 mEq  40 mEq Oral PRN    Or    potassium bicarb-citric acid (EFFER-K) effervescent tablet 40 mEq  40 mEq Oral PRN    Or    potassium chloride 10 mEq/100 mL IVPB (Peripheral Line)  10 mEq IntraVENous PRN    magnesium sulfate 2000 mg in 50 mL IVPB premix  2,000 mg IntraVENous PRN    acetaminophen (TYLENOL) tablet 650 mg  650 mg Oral Q6H PRN    Or    acetaminophen (TYLENOL) suppository 650 mg  650 mg Rectal Q6H PRN     ______________________________________________________________________  EXPECTED LENGTH OF STAY: Unable to retrieve estimated LOS  ACTUAL LENGTH OF STAY:          6                 KYLE Preciado - NP    
\"PSAT\"   No results found for: \"FOL\", \"RBCF\"   No results for input(s): \"PH\", \"PCO2\", \"PO2\" in the last 72 hours.  No results for input(s): \"CPK\" in the last 72 hours.    Invalid input(s): \"CPKMB\", \"CKNDX\", \"TROIQ\"  Lab Results   Component Value Date/Time    CHOL 241 12/23/2023 06:03 AM    HDL 49 12/23/2023 06:03 AM     No results found for: \"GLUCPOC\"        Medications Reviewed:     Current Facility-Administered Medications   Medication Dose Route Frequency    0.9% NaCl with KCl 40 mEq infusion   IntraVENous Continuous    atorvastatin (LIPITOR) tablet 80 mg  80 mg Oral Nightly    mirtazapine (REMERON) tablet 15 mg  15 mg Oral Nightly    aspirin chewable tablet 81 mg  81 mg Oral Daily    sodium chloride flush 0.9 % injection 5-40 mL  5-40 mL IntraVENous 2 times per day    sodium chloride flush 0.9 % injection 5-40 mL  5-40 mL IntraVENous PRN    0.9 % sodium chloride infusion   IntraVENous PRN    ondansetron (ZOFRAN-ODT) disintegrating tablet 4 mg  4 mg Oral Q8H PRN    Or    ondansetron (ZOFRAN) injection 4 mg  4 mg IntraVENous Q6H PRN    polyethylene glycol (GLYCOLAX) packet 17 g  17 g Oral Daily PRN    sodium chloride flush 0.9 % injection 5-40 mL  5-40 mL IntraVENous 2 times per day    sodium chloride flush 0.9 % injection 5-40 mL  5-40 mL IntraVENous PRN    0.9 % sodium chloride infusion   IntraVENous PRN    potassium chloride (KLOR-CON) extended release tablet 40 mEq  40 mEq Oral PRN    Or    potassium bicarb-citric acid (EFFER-K) effervescent tablet 40 mEq  40 mEq Oral PRN    Or    potassium chloride 10 mEq/100 mL IVPB (Peripheral Line)  10 mEq IntraVENous PRN    magnesium sulfate 2000 mg in 50 mL IVPB premix  2,000 mg IntraVENous PRN    acetaminophen (TYLENOL) tablet 650 mg  650 mg Oral Q6H PRN    Or    acetaminophen (TYLENOL) suppository 650 mg  650 mg Rectal Q6H PRN     ______________________________________________________________________  EXPECTED LENGTH OF STAY: Unable to retrieve estimated LOS  ACTUAL 
prox EDV 11.9 cm/s    Left CCA dist PSV 49.4 cm/s    Left CCA dist EDV 9.2 cm/s    Left ECA PSV 43.3 cm/s    Left ECA EDV 0.00 cm/s    Left ICA prox PSV 42.4 cm/s    Left ICA prox EDV 12.7 cm/s    Left ICA mid PSV 37.5 cm/s    Left ICA mid EDV 10.3 cm/s    Left ICA dist PSV 29.1 cm/s    Left ICA dist EDV 8.8 cm/s    Left vertebral PSV 33.1 cm/s    Left vertebral EDV 5.30 cm/s    Left ICA/CCA PSV 0.90 no units    Body Surface Area 1.74 m2     Imaging (personally reviewed by myself):  CT head negative for acute process.      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 Severe chronic microvascular ischemic changes and moderate to severe cerebral atrophy.    I have spent 35 minutes of time involved in chart review, lab review, imaging review, consultations with specialists and attendings, family discussion/decision making and documentation.     Signed By: KYLE Hinds - NP, Neurocritical Care Nurse Practitioner    December 24, 2023

## 2024-01-03 NOTE — HOSPICE
Arrived to patient house and was met by son in law. Patient was in hospital bed and lethargic. Frowning noted upon tuning and some groaning but subsided with repositioning. Reyna patent draining approx 200 cc of urine today. Son in law believes patient has had not had fluids or food for about 3 days. Her extremities are cool touch. Pedal pulses are weak. No lower extremity edema noted. No bowel sounds noted. Abdomen soft and nontender. Respirations with 3 second periods of apnea and 25 per minute. Pulse ranging from 110-125. Dayan Lazo NP called and Lorazepam frequency was increased. See emar. Education performed with son in law. He voiced understanding. An as needed dose of lorazepam and hydromorphine was givenas ordered with improvement of symptoms. 02 at 2 liters per minute intact. Hospice philosophy and care education performed. Medication education and SRK education performed. Told to call hospice with questions concerns and changes in status.

## 2024-01-04 NOTE — HOSPICE
Talked with daughter and introduced myself  she felt at this time family was pulling together and very supportive of each and did not see a SW need.  I instructed her to reach out if at any time that changes

## 2024-01-05 NOTE — HOSPICE
0-7 Arrived to patients home. She was in bed nonresponsive. Lungs are clear. Respirations with 3 second pauses and nonlabored. Bowel sounds are inactive. Abdomen Nontender and soft. NO eating or drinking. Pedal pulses are weak and there is no lower extremity edema. Edema noted to right lower arm. Patient was repositioned. Draw sheet put on bed and educated family on positioning. NO nonverbal signs or symptoms of pain, agitation or shortness of breath. Patient is well palliated. Called [facility]  to find out why lorazepam had not been sent and they said her medications were showing as uncovered. This was changed to covered on encl[place] end as they are all covered in [facility] and they will deliver tomorrow.  Told to call hospice with questions concerns and changes in status. Family at [facility]. Patient comfortable and well palliated.

## 2024-01-05 NOTE — HOSPICE
0-7  Arrived at room and patient was resting quietly in bed and lethargic/nonresponsive. Respirations are with 5 seconds of apnea at times and nonlabored. Bowel sounds inactive. Abdomen soft and nontender. No food or drink since admit. Pedal pulse weak and some mottling noted to feet with cool extremities. No lower extremity edema. NOted edema to right arm. No nonverbal signs of pain, agitation or shortness of breath at this time. Family updated. Told to contact hospice with question, concerns, or change in status. Well palliated.

## 2024-01-06 NOTE — HOSPICE
unresponsive, slightly labored breathing. reviewed meds with daughter. reinforced PRN doses of hydromorphone and lorazepam every 1 hour as needed. verbalized understanding.  acetaminophen supp given for temp of 99.9.   mouth care given. pt turned to right side, heels floated.  pt appears comfortable. reminded daughter to call hospice for any needs

## 2024-01-08 ENCOUNTER — HOME CARE VISIT (OUTPATIENT)
Age: 89
End: 2024-01-08
Payer: MEDICARE